# Patient Record
Sex: MALE | Race: WHITE | Employment: OTHER | URBAN - METROPOLITAN AREA
[De-identification: names, ages, dates, MRNs, and addresses within clinical notes are randomized per-mention and may not be internally consistent; named-entity substitution may affect disease eponyms.]

---

## 2019-12-02 ENCOUNTER — HOSPITAL ENCOUNTER (OUTPATIENT)
Age: 69
Discharge: HOME | End: 2019-12-04
Payer: MEDICARE

## 2019-12-02 LAB — PROSTATE SPECIFIC ANTIGEN: 3.44 NG/ML (ref 0–4)

## 2019-12-02 PROCEDURE — 84153 ASSAY OF PSA TOTAL: CPT

## 2020-02-06 ENCOUNTER — HOSPITAL ENCOUNTER (OUTPATIENT)
Age: 70
Discharge: HOME | End: 2020-02-08

## 2020-02-06 PROCEDURE — 88305 TISSUE EXAM BY PATHOLOGIST: CPT

## 2020-05-11 ENCOUNTER — HOSPITAL ENCOUNTER (OUTPATIENT)
Age: 70
Discharge: HOME | End: 2020-05-13
Payer: MEDICARE

## 2020-05-11 ENCOUNTER — VIRTUAL VISIT (OUTPATIENT)
Dept: SURGERY | Age: 70
End: 2020-05-11
Payer: MEDICARE

## 2020-05-11 PROCEDURE — G8427 DOCREV CUR MEDS BY ELIG CLIN: HCPCS | Performed by: SURGERY

## 2020-05-11 PROCEDURE — 4040F PNEUMOC VAC/ADMIN/RCVD: CPT | Performed by: SURGERY

## 2020-05-11 PROCEDURE — 84153 ASSAY OF PSA TOTAL: CPT

## 2020-05-11 PROCEDURE — 1123F ACP DISCUSS/DSCN MKR DOCD: CPT | Performed by: SURGERY

## 2020-05-11 PROCEDURE — 84154 ASSAY OF PSA FREE: CPT

## 2020-05-11 PROCEDURE — G8421 BMI NOT CALCULATED: HCPCS | Performed by: SURGERY

## 2020-05-11 PROCEDURE — 1036F TOBACCO NON-USER: CPT | Performed by: SURGERY

## 2020-05-11 PROCEDURE — 3017F COLORECTAL CA SCREEN DOC REV: CPT | Performed by: SURGERY

## 2020-05-11 PROCEDURE — 99203 OFFICE O/P NEW LOW 30 MIN: CPT | Performed by: SURGERY

## 2020-05-11 RX ORDER — ATORVASTATIN CALCIUM 20 MG/1
TABLET, FILM COATED ORAL
COMMUNITY
Start: 2020-03-15 | End: 2020-08-20 | Stop reason: SDUPTHER

## 2020-05-11 RX ORDER — PANTOPRAZOLE SODIUM 40 MG/1
TABLET, DELAYED RELEASE ORAL DAILY PRN
COMMUNITY
Start: 2020-02-07 | End: 2020-08-20 | Stop reason: SDUPTHER

## 2020-05-11 RX ORDER — OLMESARTAN MEDOXOMIL 20 MG/1
20 TABLET ORAL
COMMUNITY
End: 2020-08-20 | Stop reason: SDUPTHER

## 2020-05-11 RX ORDER — CLONAZEPAM 0.5 MG/1
TABLET ORAL NIGHTLY PRN
COMMUNITY
Start: 2020-04-06 | End: 2020-08-20 | Stop reason: SDUPTHER

## 2020-05-11 NOTE — PROGRESS NOTES
TELEHEALTH EVALUATION -- Audio/Visual (During  public health emergency)    History and Physical - General Surgery    Patient's Name/Date of Birth: Deloris Silva / 1950    Date: 2020    PCP: Karlene López MD    Referring Physician:   Bella Mccartney MD  [phone]      CHIEF COMPLAINT:    Chief Complaint   Patient presents with    Gastroesophageal Reflux     pt states he has been dealing with is a while. pt states he has gastritis, coughing, pt states he does have some burning when hes laying down. pt does protonix as needed x a few years     Other     pt has history of both hips being replaced will likely need abx        Deloris Silva (:  1950) has requested an audio/video evaluation for the following concern(s):    heartburn    Deloris Silva is an 71 y.o. male who presents with heartburn. He has been on Protonix off and on through the years. He has been taking Carafate for a while. He saw Dr. Opal Aragon many years ago and was told he had hemorrhagic gastritis at that time. He will take a course for a month or so and then feel better and stops it. He said he has been meaning to have this done for a while. He has regular colonoscopies. He has had these done by Dr. Juli Lorenzo in the past and he has a family history of colon cancer - his father  of it. He has no nausea or vomiting or abdominal pain. He said he has a constant tickle in his throat and is coughing a lot. He said this has been going on for years. Past Medical History:   Past Medical History:   Diagnosis Date    CAD (coronary artery disease)     Ventricular ectopic beats 2013        Past Surgical History:   Past Surgical History:   Procedure Laterality Date    COLONOSCOPY      CORONARY ANGIOPLASTY      DIAGNOSTIC CARDIAC CATH LAB PROCEDURE      JOINT REPLACEMENT Right , 2011    JOINT REPLACEMENT Left ,     TONSILLECTOMY          Allergies: Patient has no known allergies.

## 2020-05-14 LAB
PROSTATE SPECIFIC ANTIGEN FREE: 1.6 NG/ML
PROSTATE SPECIFIC ANTIGEN PERCENT FREE: 39 %
PROSTATE SPECIFIC ANTIGEN: 4.1 NG/ML (ref 0–4)

## 2020-05-18 ENCOUNTER — HOSPITAL ENCOUNTER (OUTPATIENT)
Age: 70
Setting detail: SPECIMEN
Discharge: HOME | End: 2020-05-18
Payer: MEDICARE

## 2020-05-18 PROCEDURE — U0003 INFECTIOUS AGENT DETECTION BY NUCLEIC ACID (DNA OR RNA); SEVERE ACUTE RESPIRATORY SYNDROME CORONAVIRUS 2 (SARS-COV-2) (CORONAVIRUS DISEASE [COVID-19]), AMPLIFIED PROBE TECHNIQUE, MAKING USE OF HIGH THROUGHPUT TECHNOLOGIES AS DESCRIBED BY CMS-2020-01-R: HCPCS

## 2020-05-19 RX ORDER — ALLOPURINOL 100 MG/1
100 TABLET ORAL EVERY OTHER DAY
COMMUNITY
End: 2020-08-20 | Stop reason: SDUPTHER

## 2020-05-19 RX ORDER — AMOXICILLIN 250 MG/1
250 CAPSULE ORAL DAILY PRN
COMMUNITY

## 2020-05-19 NOTE — PROGRESS NOTES
[facility] PRE-ADMISSION TESTING INSTRUCTIONS    The Preadmission Testing patient is instructed accordingly using the following criteria (check applicable):    ARRIVAL INSTRUCTIONS:  [x] Parking the day of Surgery is located in the Main Entrance lot. Upon entering the door, make an immediate right to the surgery reception desk    [] [phone] is available Monday through Friday 6 am to 6 pm    [x] Bring photo ID and insurance card    [] Bring in a copy of Living will or Durable Power of  papers. [x] Please be sure to arrange for responsible adult to provide transportation to and from the hospital    [x] Please arrange for responsible adult to be with you for the 24 hour period post procedure due to having anesthesia      GENERAL INSTRUCTIONS:    [x] Nothing by mouth after midnight, including gum, candy, mints or water    [x] You may brush your teeth, but do not swallow any water    [x] Take medications as instructed with 1-2 oz of water    [x] Stop herbal supplements and vitamins 5 days prior to procedure    [x] Follow preop dosing of blood thinners per physician instructions    [] Take 1/2 dose of evening insulin, but no insulin after midnight    [] No oral diabetic medications after midnight    [] If diabetic and have low blood sugar or feel symptomatic, take 1-2oz apple juice only    [] Bring inhalers day of surgery    [] Bring C-PAP/ Bi-Pap day of surgery    [] Bring urine specimen day of surgery    [] Shower or bath with soap, lather and rinse well, AM of Surgery, no lotion, powders or creams to surgical site    [] Follow bowel prep as instructed per surgeon    [x] No tobacco products within 24 hours of surgery     [x] No alcohol or illegal drug use within 24 hours of surgery.     [x] Jewelry, body piercing's, eyeglasses, contact lenses and dentures are not permitted into surgery (bring cases)      [] Please do not wear any nail polish, make up or hair products on the day of surgery    [x] If not already done, you can expect a call from registration    [x] You can expect a call the business day prior to procedure to notify you if your arrival time changes    [x] If you receive a survey after surgery we would greatly appreciate your comments    [] Parent/guardian of a minor must accompany their child and remain on the premises  the entire time they are under our care     [] Pediatric patients may bring favorite toy, blanket or comfort item with them    [] A caregiver or family member must remain with the patient during their stay if they are mentally handicapped, have dementia, disoriented or unable to use a call light or would be a safety concern if left unattended    [x] Please notify surgeon if you develop any illness between now and time of surgery (cold, cough, sore throat, fever, nausea, vomiting) or any signs of infections  including skin, wounds, and dental.    [x]  The Outpatient Pharmacy is available to fill your prescription here on your day of surgery, ask your preop nurse for details    [x] Other instructions  EDUCATIONAL MATERIALS PROVIDED:    [] PAT Preoperative Education Packet/Booklet     [] Medication List    [] Fluoroscopy Information Pamphlet    [] Transfusion bracelet applied with instructions    [] Joint replacement video reviewed    [] Shower with soap, lather and rinse well, and use CHG wipes provided the evening before surgery as instructed

## 2020-05-20 LAB
SARS-COV-2: NOT DETECTED
SOURCE: NORMAL

## 2020-05-22 ENCOUNTER — ANESTHESIA EVENT (OUTPATIENT)
Dept: ENDOSCOPY | Age: 70
End: 2020-05-22
Payer: MEDICARE

## 2020-05-22 ENCOUNTER — HOSPITAL ENCOUNTER (OUTPATIENT)
Age: 70
Setting detail: OUTPATIENT SURGERY
Discharge: HOME | End: 2020-05-22
Attending: SURGERY | Admitting: SURGERY
Payer: MEDICARE

## 2020-05-22 ENCOUNTER — ANESTHESIA (OUTPATIENT)
Dept: ENDOSCOPY | Age: 70
End: 2020-05-22
Payer: MEDICARE

## 2020-05-22 VITALS
BODY MASS INDEX: 28.49 KG/M2 | DIASTOLIC BLOOD PRESSURE: 86 MMHG | OXYGEN SATURATION: 96 % | WEIGHT: 215 LBS | TEMPERATURE: 97.8 F | RESPIRATION RATE: 16 BRPM | SYSTOLIC BLOOD PRESSURE: 150 MMHG | HEART RATE: 52 BPM | HEIGHT: 73 IN

## 2020-05-22 VITALS
RESPIRATION RATE: 11 BRPM | DIASTOLIC BLOOD PRESSURE: 84 MMHG | SYSTOLIC BLOOD PRESSURE: 158 MMHG | OXYGEN SATURATION: 93 %

## 2020-05-22 PROCEDURE — 7100000010 HC PHASE II RECOVERY - FIRST 15 MIN: Performed by: SURGERY

## 2020-05-22 PROCEDURE — 3609012400 HC EGD TRANSORAL BIOPSY SINGLE/MULTIPLE: Performed by: SURGERY

## 2020-05-22 PROCEDURE — 2709999900 HC NON-CHARGEABLE SUPPLY: Performed by: SURGERY

## 2020-05-22 PROCEDURE — 88305 TISSUE EXAM BY PATHOLOGIST: CPT

## 2020-05-22 PROCEDURE — 3700000000 HC ANESTHESIA ATTENDED CARE: Performed by: SURGERY

## 2020-05-22 PROCEDURE — 3700000001 HC ADD 15 MINUTES (ANESTHESIA): Performed by: SURGERY

## 2020-05-22 PROCEDURE — 7100000011 HC PHASE II RECOVERY - ADDTL 15 MIN: Performed by: SURGERY

## 2020-05-22 PROCEDURE — 43239 EGD BIOPSY SINGLE/MULTIPLE: CPT | Performed by: SURGERY

## 2020-05-22 PROCEDURE — 88342 IMHCHEM/IMCYTCHM 1ST ANTB: CPT

## 2020-05-22 PROCEDURE — 2580000003 HC RX 258: Performed by: NURSE ANESTHETIST, CERTIFIED REGISTERED

## 2020-05-22 PROCEDURE — 6360000002 HC RX W HCPCS: Performed by: NURSE ANESTHETIST, CERTIFIED REGISTERED

## 2020-05-22 RX ORDER — PROPOFOL 10 MG/ML
INJECTION, EMULSION INTRAVENOUS PRN
Status: DISCONTINUED | OUTPATIENT
Start: 2020-05-22 | End: 2020-05-22 | Stop reason: SDUPTHER

## 2020-05-22 RX ORDER — SODIUM CHLORIDE 9 MG/ML
INJECTION, SOLUTION INTRAVENOUS CONTINUOUS PRN
Status: DISCONTINUED | OUTPATIENT
Start: 2020-05-22 | End: 2020-05-22 | Stop reason: SDUPTHER

## 2020-05-22 RX ADMIN — SODIUM CHLORIDE: 9 INJECTION, SOLUTION INTRAVENOUS at 09:00

## 2020-05-22 RX ADMIN — PROPOFOL 200 MG: 10 INJECTION, EMULSION INTRAVENOUS at 09:09

## 2020-05-22 ASSESSMENT — PAIN SCALES - GENERAL
PAINLEVEL_OUTOF10: 0

## 2020-05-22 ASSESSMENT — PAIN - FUNCTIONAL ASSESSMENT: PAIN_FUNCTIONAL_ASSESSMENT: 0-10

## 2020-05-22 NOTE — H&P
Patient's office history and physical was reviewed. Patient examined. There has been no change in the patient's history and physical.      Physician Signature: Electronically signed by Dr. Reina Kehr (During [place] public health emergency)    History and Physical - General Surgery    Patient's Name/Date of Birth: Eric Ba / 1950    Date: 2020    PCP: Nevaeh Silva MD    Referring Physician:   Guera Mcnair MD  [phone]      CHIEF COMPLAINT:    No chief complaint on file. Eric Ba (:  1950) has requested an audio/video evaluation for the following concern(s):    heartburn    Eric Ba is an 71 y.o. male who presents with heartburn. He has been on Protonix off and on through the years. He has been taking Carafate for a while. He saw Dr. Dominick Colon many years ago and was told he had hemorrhagic gastritis at that time. He will take a course for a month or so and then feel better and stops it. He said he has been meaning to have this done for a while. He has regular colonoscopies. He has had these done by Dr. Bishnu Kauffman in the past and he has a family history of colon cancer - his father  of it. He has no nausea or vomiting or abdominal pain. He said he has a constant tickle in his throat and is coughing a lot. He said this has been going on for years.         Past Medical History:   Past Medical History:   Diagnosis Date    CAD (coronary artery disease)     with stents - Dr. Paco Galvin yearly     GERD (gastroesophageal reflux disease)     for EGD 20     Hypertension     Insomnia     Stress     mild- work related     Ventricular ectopic beats 2013        Past Surgical History:   Past Surgical History:   Procedure Laterality Date    COLONOSCOPY      CORONARY ANGIOPLASTY      with stent      DIAGNOSTIC CARDIAC CATH LAB PROCEDURE      ENDOSCOPY, COLON, DIAGNOSTIC      JOINT REPLACEMENT Right Pulmonary/Chest: [x] Respiratory effort normal.         [x] No visualized signs of difficulty breathing or respiratory distress        [] Abnormal-      Musculoskeletal:   [x] Normal gait with no signs of ataxia         [x] Normal range of motion of neck        [] Abnormal-       Neurological:        [x] No Facial Asymmetry (Cranial nerve 7 motor function) (limited exam to video visit)          [x] No gaze palsy        [] Abnormal-         Skin:        [x] No significant exanthematous lesions or discoloration noted on facial skin         [] Abnormal-            Psychiatric:       [x] Normal Affect [x] No Hallucinations        [] Abnormal-     Other pertinent observable physical exam findings-     Due to this being a TeleHealth encounter, evaluation of the following organ systems is limited: Vitals/Constitutional/EENT/Resp/CV/GI//MS/Neuro/Skin/Heme-Lymph-Imm. DATA:      ASSESSMENT AND PLAN:       Assessment: Malorie Jean is an 71 y.o. male who presents with heartburn    Plan: EGD possible biopsy possible polypectomy  I explained the risks, benefits, alternatives, and potential complications associated with the above procedure to be performed and transfusions when applicable with the patient/responsible person prior to the procedure. All of the patient's questions were answered. The patient understands and agrees to surgery. Physician Signature: Electronically signed by Dionisio Hernandez MD, General Surgery    Send copy of H&P to PCP, Sandra Georges MD and referring physician, Omar Cruz MD      5/11/2020        Pursuant to the emergency declaration under the [facility], [id number] waiver authority and the [employer] Act, this Virtual  Visit was conducted, with patient's consent, to reduce the patient's risk of exposure to COVID-19 and provide continuity of care for an established patient.     Services

## 2020-05-27 ENCOUNTER — TELEPHONE (OUTPATIENT)
Dept: SURGERY | Age: 70
End: 2020-05-27

## 2020-05-27 NOTE — TELEPHONE ENCOUNTER
----- Message from Yuly Pabon MD sent at 5/27/2020  1:51 PM EDT -----  Call patient with results. Schedule an appointment if still having problems.

## 2020-05-27 NOTE — TELEPHONE ENCOUNTER
Left message on voice mail. Pt to be given results of EGD. Per Dr Lexa Barnes.  No follow up needed unless pt wants to talk to

## 2020-08-20 ENCOUNTER — HOSPITAL ENCOUNTER (OUTPATIENT)
Age: 70
Discharge: HOME | End: 2020-08-22
Payer: MEDICARE

## 2020-08-20 PROBLEM — E55.9 VITAMIN D DEFICIENCY: Status: ACTIVE | Noted: 2020-08-20

## 2020-08-20 PROBLEM — E79.0 HYPERURICEMIA: Status: ACTIVE | Noted: 2020-08-20

## 2020-08-20 PROBLEM — K21.9 GASTROESOPHAGEAL REFLUX DISEASE: Status: ACTIVE | Noted: 2020-08-20

## 2020-08-20 PROBLEM — I10 ESSENTIAL HYPERTENSION: Status: ACTIVE | Noted: 2020-08-20

## 2020-08-20 PROBLEM — F41.9 ANXIETY AND DEPRESSION: Status: ACTIVE | Noted: 2020-08-20

## 2020-08-20 PROBLEM — E78.5 DYSLIPIDEMIA: Status: ACTIVE | Noted: 2020-08-20

## 2020-08-20 PROBLEM — F32.A ANXIETY AND DEPRESSION: Status: ACTIVE | Noted: 2020-08-20

## 2020-08-20 LAB
ALBUMIN SERPL-MCNC: 4.4 G/DL (ref 3.5–5.2)
ALP BLD-CCNC: 74 U/L (ref 40–129)
ALT SERPL-CCNC: 15 U/L (ref 0–40)
ANION GAP SERPL CALCULATED.3IONS-SCNC: 21 MMOL/L (ref 7–16)
AST SERPL-CCNC: 27 U/L (ref 0–39)
BASOPHILS ABSOLUTE: 0.05 E9/L (ref 0–0.2)
BASOPHILS RELATIVE PERCENT: 0.6 % (ref 0–2)
BILIRUB SERPL-MCNC: 0.8 MG/DL (ref 0–1.2)
BUN BLDV-MCNC: 21 MG/DL (ref 8–23)
CALCIUM SERPL-MCNC: 10 MG/DL (ref 8.6–10.2)
CHLORIDE BLD-SCNC: 104 MMOL/L (ref 98–107)
CHOLESTEROL, TOTAL: 150 MG/DL (ref 0–199)
CO2: 21 MMOL/L (ref 22–29)
CREAT SERPL-MCNC: 1.1 MG/DL (ref 0.7–1.2)
EOSINOPHILS ABSOLUTE: 0.11 E9/L (ref 0.05–0.5)
EOSINOPHILS RELATIVE PERCENT: 1.3 % (ref 0–6)
GFR AFRICAN AMERICAN: >60
GFR NON-AFRICAN AMERICAN: >60 ML/MIN/1.73
GLUCOSE BLD-MCNC: 90 MG/DL (ref 74–99)
HCT VFR BLD CALC: 48.5 % (ref 37–54)
HDLC SERPL-MCNC: 48 MG/DL
HEMOGLOBIN: 15.4 G/DL (ref 12.5–16.5)
IMMATURE GRANULOCYTES #: 0.03 E9/L
IMMATURE GRANULOCYTES %: 0.4 % (ref 0–5)
LDL CHOLESTEROL CALCULATED: 83 MG/DL (ref 0–99)
LYMPHOCYTES ABSOLUTE: 1.83 E9/L (ref 1.5–4)
LYMPHOCYTES RELATIVE PERCENT: 22.2 % (ref 20–42)
MCH RBC QN AUTO: 29.3 PG (ref 26–35)
MCHC RBC AUTO-ENTMCNC: 31.8 % (ref 32–34.5)
MCV RBC AUTO: 92.2 FL (ref 80–99.9)
MONOCYTES ABSOLUTE: 0.59 E9/L (ref 0.1–0.95)
MONOCYTES RELATIVE PERCENT: 7.2 % (ref 2–12)
NEUTROPHILS ABSOLUTE: 5.62 E9/L (ref 1.8–7.3)
NEUTROPHILS RELATIVE PERCENT: 68.3 % (ref 43–80)
PDW BLD-RTO: 13.2 FL (ref 11.5–15)
PLATELET # BLD: 176 E9/L (ref 130–450)
PMV BLD AUTO: 11.6 FL (ref 7–12)
POTASSIUM SERPL-SCNC: 4.6 MMOL/L (ref 3.5–5)
RBC # BLD: 5.26 E12/L (ref 3.8–5.8)
SODIUM BLD-SCNC: 146 MMOL/L (ref 132–146)
TOTAL PROTEIN: 7.4 G/DL (ref 6.4–8.3)
TRIGL SERPL-MCNC: 95 MG/DL (ref 0–149)
URIC ACID, SERUM: 6.4 MG/DL (ref 3.4–7)
VITAMIN D 25-HYDROXY: 85 NG/ML (ref 30–100)
VLDLC SERPL CALC-MCNC: 19 MG/DL
WBC # BLD: 8.2 E9/L (ref 4.5–11.5)

## 2020-08-20 PROCEDURE — 80061 LIPID PANEL: CPT

## 2020-08-20 PROCEDURE — 86703 HIV-1/HIV-2 1 RESULT ANTBDY: CPT

## 2020-08-20 PROCEDURE — 84550 ASSAY OF BLOOD/URIC ACID: CPT

## 2020-08-20 PROCEDURE — 80053 COMPREHEN METABOLIC PANEL: CPT

## 2020-08-20 PROCEDURE — 82306 VITAMIN D 25 HYDROXY: CPT

## 2020-08-20 PROCEDURE — 86803 HEPATITIS C AB TEST: CPT

## 2020-08-20 PROCEDURE — 85025 COMPLETE CBC W/AUTO DIFF WBC: CPT

## 2020-08-21 LAB
HEPATITIS C ANTIBODY INTERPRETATION: NORMAL
HIV-1 AND HIV-2 ANTIBODIES: NORMAL

## 2020-11-05 PROBLEM — I10 ESSENTIAL HYPERTENSION: Chronic | Status: ACTIVE | Noted: 2020-08-20

## 2020-11-05 PROBLEM — E78.5 DYSLIPIDEMIA: Chronic | Status: ACTIVE | Noted: 2020-08-20

## 2020-11-05 PROBLEM — K21.9 GASTROESOPHAGEAL REFLUX DISEASE: Chronic | Status: ACTIVE | Noted: 2020-08-20

## 2020-11-05 PROBLEM — F32.A ANXIETY AND DEPRESSION: Chronic | Status: ACTIVE | Noted: 2020-08-20

## 2020-11-05 PROBLEM — E79.0 HYPERURICEMIA: Chronic | Status: ACTIVE | Noted: 2020-08-20

## 2020-11-05 PROBLEM — F41.9 ANXIETY AND DEPRESSION: Chronic | Status: ACTIVE | Noted: 2020-08-20

## 2020-11-05 PROBLEM — E55.9 VITAMIN D DEFICIENCY: Chronic | Status: ACTIVE | Noted: 2020-08-20

## 2021-02-23 DIAGNOSIS — K21.9 GASTROESOPHAGEAL REFLUX DISEASE: ICD-10-CM

## 2021-02-23 DIAGNOSIS — E79.0 HYPERURICEMIA: ICD-10-CM

## 2021-02-23 DIAGNOSIS — I10 ESSENTIAL HYPERTENSION: ICD-10-CM

## 2021-02-23 DIAGNOSIS — E78.5 DYSLIPIDEMIA: ICD-10-CM

## 2021-02-23 LAB
ALBUMIN SERPL-MCNC: 4.3 G/DL (ref 3.5–5.2)
ALP BLD-CCNC: 77 U/L (ref 40–129)
ALT SERPL-CCNC: 13 U/L (ref 0–40)
ANION GAP SERPL CALCULATED.3IONS-SCNC: 6 MMOL/L (ref 7–16)
AST SERPL-CCNC: 24 U/L (ref 0–39)
BASOPHILS ABSOLUTE: 0.04 E9/L (ref 0–0.2)
BASOPHILS RELATIVE PERCENT: 0.6 % (ref 0–2)
BILIRUB SERPL-MCNC: 0.9 MG/DL (ref 0–1.2)
BUN BLDV-MCNC: 19 MG/DL (ref 8–23)
CALCIUM SERPL-MCNC: 9.2 MG/DL (ref 8.6–10.2)
CHLORIDE BLD-SCNC: 104 MMOL/L (ref 98–107)
CHOLESTEROL, TOTAL: 135 MG/DL (ref 0–199)
CO2: 31 MMOL/L (ref 22–29)
CREAT SERPL-MCNC: 1.3 MG/DL (ref 0.7–1.2)
EOSINOPHILS ABSOLUTE: 0.13 E9/L (ref 0.05–0.5)
EOSINOPHILS RELATIVE PERCENT: 2 % (ref 0–6)
GFR AFRICAN AMERICAN: >60
GFR NON-AFRICAN AMERICAN: 55 ML/MIN/1.73
GLUCOSE BLD-MCNC: 92 MG/DL (ref 74–99)
HCT VFR BLD CALC: 48.2 % (ref 37–54)
HDLC SERPL-MCNC: 39 MG/DL
HEMOGLOBIN: 15.4 G/DL (ref 12.5–16.5)
IMMATURE GRANULOCYTES #: 0.01 E9/L
IMMATURE GRANULOCYTES %: 0.2 % (ref 0–5)
LDL CHOLESTEROL CALCULATED: 79 MG/DL (ref 0–99)
LYMPHOCYTES ABSOLUTE: 1.64 E9/L (ref 1.5–4)
LYMPHOCYTES RELATIVE PERCENT: 24.8 % (ref 20–42)
MCH RBC QN AUTO: 28.7 PG (ref 26–35)
MCHC RBC AUTO-ENTMCNC: 32 % (ref 32–34.5)
MCV RBC AUTO: 89.9 FL (ref 80–99.9)
MONOCYTES ABSOLUTE: 0.57 E9/L (ref 0.1–0.95)
MONOCYTES RELATIVE PERCENT: 8.6 % (ref 2–12)
NEUTROPHILS ABSOLUTE: 4.23 E9/L (ref 1.8–7.3)
NEUTROPHILS RELATIVE PERCENT: 63.8 % (ref 43–80)
PDW BLD-RTO: 12.7 FL (ref 11.5–15)
PLATELET # BLD: 160 E9/L (ref 130–450)
PMV BLD AUTO: 11.7 FL (ref 7–12)
POTASSIUM SERPL-SCNC: 4.3 MMOL/L (ref 3.5–5)
RBC # BLD: 5.36 E12/L (ref 3.8–5.8)
SODIUM BLD-SCNC: 141 MMOL/L (ref 132–146)
TOTAL PROTEIN: 6.9 G/DL (ref 6.4–8.3)
TRIGL SERPL-MCNC: 87 MG/DL (ref 0–149)
URIC ACID, SERUM: 7.1 MG/DL (ref 3.4–7)
VLDLC SERPL CALC-MCNC: 17 MG/DL
WBC # BLD: 6.6 E9/L (ref 4.5–11.5)

## 2021-06-08 DIAGNOSIS — E78.5 DYSLIPIDEMIA: ICD-10-CM

## 2021-06-08 DIAGNOSIS — I10 ESSENTIAL HYPERTENSION: ICD-10-CM

## 2021-06-08 DIAGNOSIS — E79.0 HYPERURICEMIA: ICD-10-CM

## 2021-06-08 DIAGNOSIS — K21.9 GASTROESOPHAGEAL REFLUX DISEASE WITHOUT ESOPHAGITIS: ICD-10-CM

## 2021-06-08 DIAGNOSIS — E55.9 VITAMIN D DEFICIENCY: ICD-10-CM

## 2021-06-08 LAB
ALBUMIN SERPL-MCNC: 4 G/DL (ref 3.5–5.2)
ALP BLD-CCNC: 61 U/L (ref 40–129)
ALT SERPL-CCNC: 11 U/L (ref 0–40)
ANION GAP SERPL CALCULATED.3IONS-SCNC: 12 MMOL/L (ref 7–16)
AST SERPL-CCNC: 18 U/L (ref 0–39)
BASOPHILS ABSOLUTE: 0.02 E9/L (ref 0–0.2)
BASOPHILS RELATIVE PERCENT: 0.3 % (ref 0–2)
BILIRUB SERPL-MCNC: 0.6 MG/DL (ref 0–1.2)
BUN BLDV-MCNC: 23 MG/DL (ref 6–23)
CALCIUM SERPL-MCNC: 9.5 MG/DL (ref 8.6–10.2)
CHLORIDE BLD-SCNC: 106 MMOL/L (ref 98–107)
CHOLESTEROL, TOTAL: 165 MG/DL (ref 0–199)
CO2: 27 MMOL/L (ref 22–29)
CREAT SERPL-MCNC: 1.2 MG/DL (ref 0.7–1.2)
EOSINOPHILS ABSOLUTE: 0.14 E9/L (ref 0.05–0.5)
EOSINOPHILS RELATIVE PERCENT: 2.2 % (ref 0–6)
GFR AFRICAN AMERICAN: >60
GFR NON-AFRICAN AMERICAN: 60 ML/MIN/1.73
GLUCOSE BLD-MCNC: 93 MG/DL (ref 74–99)
HCT VFR BLD CALC: 47.1 % (ref 37–54)
HDLC SERPL-MCNC: 42 MG/DL
HEMOGLOBIN: 14.9 G/DL (ref 12.5–16.5)
IMMATURE GRANULOCYTES #: 0.03 E9/L
IMMATURE GRANULOCYTES %: 0.5 % (ref 0–5)
LDL CHOLESTEROL CALCULATED: 103 MG/DL (ref 0–99)
LYMPHOCYTES ABSOLUTE: 1.82 E9/L (ref 1.5–4)
LYMPHOCYTES RELATIVE PERCENT: 28.5 % (ref 20–42)
MCH RBC QN AUTO: 29.3 PG (ref 26–35)
MCHC RBC AUTO-ENTMCNC: 31.6 % (ref 32–34.5)
MCV RBC AUTO: 92.5 FL (ref 80–99.9)
MONOCYTES ABSOLUTE: 0.6 E9/L (ref 0.1–0.95)
MONOCYTES RELATIVE PERCENT: 9.4 % (ref 2–12)
NEUTROPHILS ABSOLUTE: 3.77 E9/L (ref 1.8–7.3)
NEUTROPHILS RELATIVE PERCENT: 59.1 % (ref 43–80)
PDW BLD-RTO: 13.2 FL (ref 11.5–15)
PLATELET # BLD: 136 E9/L (ref 130–450)
PMV BLD AUTO: 12 FL (ref 7–12)
POTASSIUM SERPL-SCNC: 5.1 MMOL/L (ref 3.5–5)
RBC # BLD: 5.09 E12/L (ref 3.8–5.8)
SODIUM BLD-SCNC: 145 MMOL/L (ref 132–146)
TOTAL PROTEIN: 6.6 G/DL (ref 6.4–8.3)
TRIGL SERPL-MCNC: 99 MG/DL (ref 0–149)
URIC ACID, SERUM: 7.2 MG/DL (ref 3.4–7)
VITAMIN D 25-HYDROXY: 76 NG/ML (ref 30–100)
VLDLC SERPL CALC-MCNC: 20 MG/DL
WBC # BLD: 6.4 E9/L (ref 4.5–11.5)

## 2021-09-16 PROBLEM — D69.6 THROMBOCYTOPENIA, UNSPECIFIED (HCC): Status: RESOLVED | Noted: 2021-09-16 | Resolved: 2021-09-16

## 2021-09-16 PROBLEM — D69.6 THROMBOCYTOPENIA, UNSPECIFIED (HCC): Status: ACTIVE | Noted: 2021-09-16

## 2021-12-16 DIAGNOSIS — I10 ESSENTIAL HYPERTENSION: ICD-10-CM

## 2021-12-16 DIAGNOSIS — E55.9 VITAMIN D DEFICIENCY: ICD-10-CM

## 2021-12-16 DIAGNOSIS — K21.9 GASTROESOPHAGEAL REFLUX DISEASE WITHOUT ESOPHAGITIS: ICD-10-CM

## 2021-12-16 DIAGNOSIS — E78.5 DYSLIPIDEMIA: ICD-10-CM

## 2021-12-16 LAB
ALBUMIN SERPL-MCNC: 4 G/DL (ref 3.5–5.2)
ALP BLD-CCNC: 71 U/L (ref 40–129)
ALT SERPL-CCNC: 25 U/L (ref 0–40)
ANION GAP SERPL CALCULATED.3IONS-SCNC: 11 MMOL/L (ref 7–16)
AST SERPL-CCNC: 19 U/L (ref 0–39)
BASOPHILS ABSOLUTE: 0.04 E9/L (ref 0–0.2)
BASOPHILS RELATIVE PERCENT: 0.5 % (ref 0–2)
BILIRUB SERPL-MCNC: 0.8 MG/DL (ref 0–1.2)
BUN BLDV-MCNC: 24 MG/DL (ref 6–23)
CALCIUM SERPL-MCNC: 9.8 MG/DL (ref 8.6–10.2)
CHLORIDE BLD-SCNC: 102 MMOL/L (ref 98–107)
CHOLESTEROL, TOTAL: 153 MG/DL (ref 0–199)
CO2: 30 MMOL/L (ref 22–29)
CREAT SERPL-MCNC: 1.2 MG/DL (ref 0.7–1.2)
EOSINOPHILS ABSOLUTE: 0.29 E9/L (ref 0.05–0.5)
EOSINOPHILS RELATIVE PERCENT: 3.4 % (ref 0–6)
GFR AFRICAN AMERICAN: >60
GFR NON-AFRICAN AMERICAN: 60 ML/MIN/1.73
GLUCOSE BLD-MCNC: 94 MG/DL (ref 74–99)
HCT VFR BLD CALC: 46.8 % (ref 37–54)
HDLC SERPL-MCNC: 44 MG/DL
HEMOGLOBIN: 15.1 G/DL (ref 12.5–16.5)
IMMATURE GRANULOCYTES #: 0.04 E9/L
IMMATURE GRANULOCYTES %: 0.5 % (ref 0–5)
LDL CHOLESTEROL CALCULATED: 79 MG/DL (ref 0–99)
LYMPHOCYTES ABSOLUTE: 2.24 E9/L (ref 1.5–4)
LYMPHOCYTES RELATIVE PERCENT: 26 % (ref 20–42)
MCH RBC QN AUTO: 29.3 PG (ref 26–35)
MCHC RBC AUTO-ENTMCNC: 32.3 % (ref 32–34.5)
MCV RBC AUTO: 90.9 FL (ref 80–99.9)
MONOCYTES ABSOLUTE: 0.71 E9/L (ref 0.1–0.95)
MONOCYTES RELATIVE PERCENT: 8.3 % (ref 2–12)
NEUTROPHILS ABSOLUTE: 5.28 E9/L (ref 1.8–7.3)
NEUTROPHILS RELATIVE PERCENT: 61.3 % (ref 43–80)
PDW BLD-RTO: 12.9 FL (ref 11.5–15)
PLATELET # BLD: 151 E9/L (ref 130–450)
PMV BLD AUTO: 11.9 FL (ref 7–12)
POTASSIUM SERPL-SCNC: 4.5 MMOL/L (ref 3.5–5)
RBC # BLD: 5.15 E12/L (ref 3.8–5.8)
SODIUM BLD-SCNC: 143 MMOL/L (ref 132–146)
TOTAL PROTEIN: 6.5 G/DL (ref 6.4–8.3)
TRIGL SERPL-MCNC: 152 MG/DL (ref 0–149)
VITAMIN D 25-HYDROXY: 77 NG/ML (ref 30–100)
VLDLC SERPL CALC-MCNC: 30 MG/DL
WBC # BLD: 8.6 E9/L (ref 4.5–11.5)

## 2022-02-03 ENCOUNTER — HOSPITAL ENCOUNTER (OUTPATIENT)
Age: 72
Discharge: HOME | End: 2022-02-05

## 2022-02-03 PROCEDURE — 88305 TISSUE EXAM BY PATHOLOGIST: CPT

## 2022-05-29 ENCOUNTER — HOSPITAL ENCOUNTER (EMERGENCY)
Age: 72
Discharge: HOME | End: 2022-05-29
Payer: MEDICARE

## 2022-05-29 VITALS
WEIGHT: 210 LBS | SYSTOLIC BLOOD PRESSURE: 152 MMHG | HEIGHT: 73 IN | TEMPERATURE: 98 F | HEART RATE: 50 BPM | DIASTOLIC BLOOD PRESSURE: 88 MMHG | OXYGEN SATURATION: 98 % | BODY MASS INDEX: 27.83 KG/M2 | RESPIRATION RATE: 18 BRPM

## 2022-05-29 DIAGNOSIS — J06.9 ACUTE UPPER RESPIRATORY INFECTION: Primary | ICD-10-CM

## 2022-05-29 PROCEDURE — 99211 OFF/OP EST MAY X REQ PHY/QHP: CPT

## 2022-05-29 RX ORDER — CEPHALEXIN 500 MG/1
500 CAPSULE ORAL 3 TIMES DAILY
Qty: 21 CAPSULE | Refills: 0 | Status: SHIPPED | OUTPATIENT
Start: 2022-05-29 | End: 2022-06-05

## 2022-05-29 ASSESSMENT — PAIN - FUNCTIONAL ASSESSMENT: PAIN_FUNCTIONAL_ASSESSMENT: 0-10

## 2022-05-29 NOTE — ED PROVIDER NOTES
Department of Emergency Medicine   ED  Provider Note  Admit Date/RoomTime: 5/29/2022  8:07 AM  ED Room: 02/02 5/29/22  8:24 AM EDT      HPI: Nate Orr is a 70 y.o. male with a past medical history of  has a past medical history of CAD (coronary artery disease), GERD (gastroesophageal reflux disease), Hypertension, Insomnia, Spondylosis, Stress, and Ventricular ectopic beats. presenting with complaints of a cough with nasal congestion. The patient states that these symptoms began gradually over the past 3 days. The history is obtained from the patient. Patient does complain of a mild cough associated with it that is nonproductive. Patient denies excessive fatigue or sleeping greater than 18 hours a day. Patient denies exposure to mononucleosis. The patient denies any abdominal pain, left upper quadrant fullness, or early satiety. The patient also denies difficulty breathing, hemoptysis, neck pain/stiffness, or blurry vision, or chest pain. Sx have persisted and are mildly worse which is what prompted the visit today. Denies exposure to sick contacts. 2 negative home covid tests. Review of Systems:   A complete review of systems was performed and pertinent positives and negatives are stated within HPI, all other systems reviewed and are negative.        --------------------------------------------- PAST HISTORY ---------------------------------------------  Past Medical History:  has a past medical history of CAD (coronary artery disease), GERD (gastroesophageal reflux disease), Hypertension, Insomnia, Spondylosis, Stress, and Ventricular ectopic beats. Past Surgical History:  has a past surgical history that includes Colonoscopy; Tonsillectomy; Diagnostic Cardiac Cath Lab Procedure; Endoscopy, colon, diagnostic; joint replacement (Right, 1990, 11/2011); joint replacement (Left, 1990, 2007); Coronary angioplasty; and Upper gastrointestinal endoscopy (N/A, 5/22/2020).     Social History: reports that he has never smoked. He has never used smokeless tobacco. He reports that he does not drink alcohol and does not use drugs. Family History: family history includes Cancer in his father; Diabetes in his mother. The patients home medications have been reviewed. Allergies: Patient has no known allergies. Physical exam:  Constitutional: Vital signs are reviewed the patient is comfortable. The patient is alert and oriented and conversant. Head: The head is atraumatic and normocephalic. Eyes: No discharge is present from the eyes. The sclera are normal.  Ears: TMs bilaterally demonstrate no erythema, no bulging and no evidence of infection. Mouth: The oropharynx demonstrates a small amount of erythema bilaterally. There is no tonsillar enlargement nor is there any exudate present. No uvular deviation or edema. No tonsillary asymmetry. Floor of the mouth soft, no trismus, handling secretions. Neck: Normal range of motion is achieved in the neck. There is no JVD present. No meningeal signs are present   Anterior cervical adenopathy is absent. [place] Respiratory: The chest is nontender. Breath sounds are clear to auscultation bilaterally. No wheezes, rales, or rhonchi. There is no respiratory distress. Cardiovascular: Heart shows a regular rate and rhythm without murmurs, rubs, clicks or gallops. Abdominal exam: The abdomen is non tender without evidence of peritoneal signs. Specific attention to the left upper quadrant with palpation of the spleen demonstrates no organomegaly or tenderness  Skin: warm and dry, without rash  Neurologic: GCS 15   Psych: Normal Affect  -------------------------------------------------- RESULTS -------------------------------------------------    LABS:  No results found for this visit on 05/29/22.     RADIOLOGY:  Interpreted by Radiologist.  No orders to display             ------------------------- NURSING NOTES AND VITALS REVIEWED ---------------------------   The nursing notes within the ED encounter and vital signs as below have been reviewed. BP (!) 152/88   Pulse 50   Temp 98 °F (36.7 °C)   Resp 18   Ht 6' 1\" (1.854 m)   Wt 210 lb (95.3 kg)   SpO2 98%   BMI 27.71 kg/m²   Oxygen Saturation Interpretation: Normal    The patients available past medical records and past encounters were reviewed. ------------------------------ ED COURSE/MEDICAL DECISION MAKING----------------------  Medications - No data to display          Medical Decision Making:         Upper respiratory infection. Not hypoxic, nothing to suggest pneumonia. Well appearing, non toxic, appropriate for outpatient management. Plan is for symptom management and PCP follow up. This patient's ED course included: a personal history and physicial eaxmination and re-evaluation prior to disposition    This patient has remained hemodynamically stable during their ED course. Counseling: The emergency provider has spoken with the patient and discussed todays results, in addition to providing specific details for the plan of care and counseling regarding the diagnosis and prognosis. Questions are answered at this time and they are agreeable with the plan.       --------------------------------- IMPRESSION AND DISPOSITION ---------------------------------    IMPRESSION  1.  Acute upper respiratory infection        DISPOSITION  Disposition: Discharge to home  Patient condition is good            Elizabeth De La Fuente, [address]  05/29/22 [id number]

## 2022-06-22 PROBLEM — D69.6 THROMBOCYTOPENIA, UNSPECIFIED (HCC): Status: RESOLVED | Noted: 2022-06-22 | Resolved: 2022-06-22

## 2022-06-22 PROBLEM — D69.6 THROMBOCYTOPENIA, UNSPECIFIED (HCC): Status: ACTIVE | Noted: 2022-06-22

## 2022-06-27 ENCOUNTER — APPOINTMENT (OUTPATIENT)
Dept: CT IMAGING | Age: 72
End: 2022-06-27
Payer: COMMERCIAL

## 2022-06-27 ENCOUNTER — HOSPITAL ENCOUNTER (EMERGENCY)
Age: 72
Discharge: HOME | End: 2022-06-27
Payer: COMMERCIAL

## 2022-06-27 ENCOUNTER — APPOINTMENT (OUTPATIENT)
Dept: GENERAL RADIOLOGY | Age: 72
End: 2022-06-27
Payer: COMMERCIAL

## 2022-06-27 VITALS
DIASTOLIC BLOOD PRESSURE: 96 MMHG | RESPIRATION RATE: 16 BRPM | TEMPERATURE: 97.7 F | HEIGHT: 73 IN | BODY MASS INDEX: 27.83 KG/M2 | HEART RATE: 60 BPM | SYSTOLIC BLOOD PRESSURE: 189 MMHG | OXYGEN SATURATION: 99 % | WEIGHT: 210 LBS
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
## 2022-06-27 DIAGNOSIS — S29.012A STRAIN OF THORACIC BACK REGION: ICD-10-CM

## 2022-06-27 DIAGNOSIS — S16.1XXA CERVICAL STRAIN, INITIAL ENCOUNTER: ICD-10-CM

## 2022-06-27 PROCEDURE — 70450 CT HEAD/BRAIN W/O DYE: CPT

## 2022-06-27 PROCEDURE — 72128 CT CHEST SPINE W/O DYE: CPT

## 2022-06-27 PROCEDURE — 99284 EMERGENCY DEPT VISIT MOD MDM: CPT

## 2022-06-27 PROCEDURE — 71045 X-RAY EXAM CHEST 1 VIEW: CPT

## 2022-06-27 PROCEDURE — 72125 CT NECK SPINE W/O DYE: CPT

## 2022-06-27 RX ORDER — NAPROXEN 500 MG/1
500 TABLET ORAL 2 TIMES DAILY PRN
Qty: 20 TABLET | Refills: 0 | Status: SHIPPED | OUTPATIENT
Start: 2022-06-27 | End: 2022-10-18

## 2022-06-27 RX ORDER — METHOCARBAMOL 750 MG/1
750 TABLET, FILM COATED ORAL EVERY 8 HOURS PRN
Qty: 24 TABLET | Refills: 0 | Status: SHIPPED | OUTPATIENT
Start: 2022-06-27 | End: 2022-07-04

## 2022-06-27 ASSESSMENT — PAIN DESCRIPTION - LOCATION: LOCATION: BACK

## 2022-06-27 ASSESSMENT — PAIN DESCRIPTION - FREQUENCY: FREQUENCY: CONTINUOUS

## 2022-06-27 ASSESSMENT — PAIN - FUNCTIONAL ASSESSMENT: PAIN_FUNCTIONAL_ASSESSMENT: 0-10

## 2022-06-27 ASSESSMENT — PAIN DESCRIPTION - DESCRIPTORS: DESCRIPTORS: ACHING

## 2022-06-27 ASSESSMENT — PAIN DESCRIPTION - ORIENTATION: ORIENTATION: MID

## 2022-06-27 ASSESSMENT — PAIN DESCRIPTION - PAIN TYPE: TYPE: ACUTE PAIN
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
## 2023-02-08 ENCOUNTER — HOSPITAL ENCOUNTER (INPATIENT)
Age: 73
LOS: 1 days | Discharge: HOME | End: 2023-02-10
Attending: EMERGENCY MEDICINE | Admitting: SURGERY
Payer: MEDICARE

## 2023-02-08 ENCOUNTER — APPOINTMENT (OUTPATIENT)
Dept: CT IMAGING | Age: 73
End: 2023-02-08
Payer: MEDICARE

## 2023-02-08 DIAGNOSIS — K35.80 ACUTE APPENDICITIS, UNSPECIFIED ACUTE APPENDICITIS TYPE: ICD-10-CM

## 2023-02-08 DIAGNOSIS — K35.20 ACUTE APPENDICITIS WITH GENERALIZED PERITONITIS, WITHOUT GANGRENE OR ABSCESS, UNSPECIFIED WHETHER PERFORATION PRESENT: Primary | ICD-10-CM

## 2023-02-08 LAB
ALBUMIN SERPL-MCNC: 3.8 G/DL (ref 3.5–5.2)
ALP BLD-CCNC: 64 U/L (ref 40–129)
ALT SERPL-CCNC: 11 U/L (ref 0–40)
ANION GAP SERPL CALCULATED.3IONS-SCNC: 10 MMOL/L (ref 7–16)
AST SERPL-CCNC: 16 U/L (ref 0–39)
BACTERIA: ABNORMAL /HPF
BASOPHILS ABSOLUTE: 0.02 E9/L (ref 0–0.2)
BASOPHILS RELATIVE PERCENT: 0.2 % (ref 0–2)
BILIRUB SERPL-MCNC: 0.9 MG/DL (ref 0–1.2)
BILIRUBIN URINE: NEGATIVE
BLOOD, URINE: ABNORMAL
BUN BLDV-MCNC: 21 MG/DL (ref 6–23)
CALCIUM SERPL-MCNC: 9 MG/DL (ref 8.6–10.2)
CHLORIDE BLD-SCNC: 103 MMOL/L (ref 98–107)
CLARITY: CLEAR
CO2: 27 MMOL/L (ref 22–29)
COLOR: YELLOW
CREAT SERPL-MCNC: 1.1 MG/DL (ref 0.7–1.2)
EOSINOPHILS ABSOLUTE: 0.12 E9/L (ref 0.05–0.5)
EOSINOPHILS RELATIVE PERCENT: 1.1 % (ref 0–6)
GFR SERPL CREATININE-BSD FRML MDRD: >60 ML/MIN/1.73
GLUCOSE BLD-MCNC: 98 MG/DL (ref 74–99)
GLUCOSE URINE: NEGATIVE MG/DL
HCT VFR BLD CALC: 42.4 % (ref 37–54)
HEMOGLOBIN: 13.7 G/DL (ref 12.5–16.5)
IMMATURE GRANULOCYTES #: 0.04 E9/L
IMMATURE GRANULOCYTES %: 0.4 % (ref 0–5)
KETONES, URINE: ABNORMAL MG/DL
LACTIC ACID: 1.1 MMOL/L (ref 0.5–2.2)
LEUKOCYTE ESTERASE, URINE: NEGATIVE
LIPASE: 38 U/L (ref 13–60)
LYMPHOCYTES ABSOLUTE: 1.89 E9/L (ref 1.5–4)
LYMPHOCYTES RELATIVE PERCENT: 17 % (ref 20–42)
MCH RBC QN AUTO: 28.4 PG (ref 26–35)
MCHC RBC AUTO-ENTMCNC: 32.3 % (ref 32–34.5)
MCV RBC AUTO: 87.8 FL (ref 80–99.9)
MONOCYTES ABSOLUTE: 0.99 E9/L (ref 0.1–0.95)
MONOCYTES RELATIVE PERCENT: 8.9 % (ref 2–12)
NEUTROPHILS ABSOLUTE: 8.07 E9/L (ref 1.8–7.3)
NEUTROPHILS RELATIVE PERCENT: 72.4 % (ref 43–80)
NITRITE, URINE: NEGATIVE
PDW BLD-RTO: 13.2 FL (ref 11.5–15)
PH UA: 5.5 (ref 5–9)
PLATELET # BLD: 140 E9/L (ref 130–450)
PMV BLD AUTO: 11 FL (ref 7–12)
POTASSIUM REFLEX MAGNESIUM: 4.2 MMOL/L (ref 3.5–5)
PROTEIN UA: NEGATIVE MG/DL
RBC # BLD: 4.83 E12/L (ref 3.8–5.8)
RBC UA: ABNORMAL /HPF (ref 0–2)
SODIUM BLD-SCNC: 140 MMOL/L (ref 132–146)
SPECIFIC GRAVITY UA: 1.02 (ref 1–1.03)
TOTAL PROTEIN: 6.6 G/DL (ref 6.4–8.3)
UROBILINOGEN, URINE: 0.2 E.U./DL
WBC # BLD: 11.1 E9/L (ref 4.5–11.5)
WBC UA: ABNORMAL /HPF (ref 0–5)

## 2023-02-08 PROCEDURE — 96375 TX/PRO/DX INJ NEW DRUG ADDON: CPT

## 2023-02-08 PROCEDURE — 6360000002 HC RX W HCPCS: Performed by: EMERGENCY MEDICINE

## 2023-02-08 PROCEDURE — 99285 EMERGENCY DEPT VISIT HI MDM: CPT

## 2023-02-08 PROCEDURE — 80053 COMPREHEN METABOLIC PANEL: CPT

## 2023-02-08 PROCEDURE — 96374 THER/PROPH/DIAG INJ IV PUSH: CPT

## 2023-02-08 PROCEDURE — 85025 COMPLETE CBC W/AUTO DIFF WBC: CPT

## 2023-02-08 PROCEDURE — 81001 URINALYSIS AUTO W/SCOPE: CPT

## 2023-02-08 PROCEDURE — 83690 ASSAY OF LIPASE: CPT

## 2023-02-08 PROCEDURE — 74177 CT ABD & PELVIS W/CONTRAST: CPT

## 2023-02-08 PROCEDURE — 6360000004 HC RX CONTRAST MEDICATION: Performed by: RADIOLOGY

## 2023-02-08 PROCEDURE — 83605 ASSAY OF LACTIC ACID: CPT

## 2023-02-08 PROCEDURE — 2580000003 HC RX 258: Performed by: EMERGENCY MEDICINE

## 2023-02-08 RX ORDER — 0.9 % SODIUM CHLORIDE 0.9 %
1000 INTRAVENOUS SOLUTION INTRAVENOUS ONCE
Status: COMPLETED | OUTPATIENT
Start: 2023-02-09 | End: 2023-02-09

## 2023-02-08 RX ORDER — MORPHINE SULFATE 4 MG/ML
4 INJECTION, SOLUTION INTRAMUSCULAR; INTRAVENOUS ONCE
Status: DISCONTINUED | OUTPATIENT
Start: 2023-02-08 | End: 2023-02-10 | Stop reason: HOSPADM

## 2023-02-08 RX ORDER — METRONIDAZOLE 500 MG/100ML
500 INJECTION, SOLUTION INTRAVENOUS EVERY 8 HOURS
Status: DISCONTINUED | OUTPATIENT
Start: 2023-02-09 | End: 2023-02-10 | Stop reason: HOSPADM

## 2023-02-08 RX ORDER — ONDANSETRON 2 MG/ML
4 INJECTION INTRAMUSCULAR; INTRAVENOUS ONCE
Status: COMPLETED | OUTPATIENT
Start: 2023-02-08 | End: 2023-02-08

## 2023-02-08 RX ORDER — 0.9 % SODIUM CHLORIDE 0.9 %
1000 INTRAVENOUS SOLUTION INTRAVENOUS ONCE
Status: COMPLETED | OUTPATIENT
Start: 2023-02-08 | End: 2023-02-09

## 2023-02-08 RX ADMIN — ONDANSETRON 4 MG: 2 INJECTION INTRAMUSCULAR; INTRAVENOUS at 21:20

## 2023-02-08 RX ADMIN — IOPAMIDOL 75 ML: 755 INJECTION, SOLUTION INTRAVENOUS at 22:57

## 2023-02-08 RX ADMIN — SODIUM CHLORIDE 1000 ML: 9 INJECTION, SOLUTION INTRAVENOUS at 21:15

## 2023-02-08 ASSESSMENT — PAIN DESCRIPTION - FREQUENCY
FREQUENCY: CONTINUOUS
FREQUENCY: CONTINUOUS

## 2023-02-08 ASSESSMENT — PAIN - FUNCTIONAL ASSESSMENT: PAIN_FUNCTIONAL_ASSESSMENT: 0-10

## 2023-02-08 ASSESSMENT — PAIN SCALES - GENERAL
PAINLEVEL_OUTOF10: 8
PAINLEVEL_OUTOF10: 8

## 2023-02-08 ASSESSMENT — PAIN DESCRIPTION - DESCRIPTORS
DESCRIPTORS: SHARP;PRESSURE;STABBING
DESCRIPTORS: SHARP;PRESSURE

## 2023-02-08 ASSESSMENT — PAIN DESCRIPTION - ORIENTATION: ORIENTATION: RIGHT;LOWER

## 2023-02-08 ASSESSMENT — PAIN DESCRIPTION - PAIN TYPE
TYPE: ACUTE PAIN
TYPE: ACUTE PAIN

## 2023-02-08 ASSESSMENT — PAIN DESCRIPTION - LOCATION
LOCATION: ABDOMEN
LOCATION: ABDOMEN

## 2023-02-08 NOTE — ED NOTES
Department of Emergency Medicine  FIRST PROVIDER TRIAGE NOTE             Independent MLP           2/8/23  6:47 PM EST    Date of Encounter: 2/8/23   MRN: [id number]      HPI: Michell Sams is a 67 y.o. male who presents to the ED for Abdominal Pain (RLQ pain, started a couple days ago, worse last night ) and Nausea  She complains of right lower quadrant abdominal pain which began yesterday. States the pain was worse last night. States that today's began to have nausea. ROS: Negative for cp or sob. PE: Gen Appearance/Constitutional: alert  CV: regular rate  Pulm: CTA bilat     Initial Plan of Care: All treatment areas with department are currently occupied. Plan to order/Initiate the following while awaiting opening in ED: labs.   Initiate Treatment-Testing, Proceed toTreatment Area When Bed Available for ED Attending/MLP to Continue Care    Electronically signed by CHRISTEN Caba CNP   DD: 2/8/23       CHRISTEN Caba CNP  02/08/23 [id number]

## 2023-02-09 ENCOUNTER — ANESTHESIA (OUTPATIENT)
Dept: OPERATING ROOM | Age: 73
End: 2023-02-09
Payer: MEDICARE

## 2023-02-09 ENCOUNTER — ANESTHESIA EVENT (OUTPATIENT)
Dept: OPERATING ROOM | Age: 73
End: 2023-02-09
Payer: MEDICARE

## 2023-02-09 PROBLEM — K35.80 ACUTE APPENDICITIS: Status: ACTIVE | Noted: 2023-02-09

## 2023-02-09 PROCEDURE — 7100000001 HC PACU RECOVERY - ADDTL 15 MIN: Performed by: SURGERY

## 2023-02-09 PROCEDURE — 87102 FUNGUS ISOLATION CULTURE: CPT

## 2023-02-09 PROCEDURE — 87070 CULTURE OTHR SPECIMN AEROBIC: CPT

## 2023-02-09 PROCEDURE — 2580000003 HC RX 258: Performed by: NURSE ANESTHETIST, CERTIFIED REGISTERED

## 2023-02-09 PROCEDURE — 87116 MYCOBACTERIA CULTURE: CPT

## 2023-02-09 PROCEDURE — 3700000000 HC ANESTHESIA ATTENDED CARE: Performed by: SURGERY

## 2023-02-09 PROCEDURE — 2500000003 HC RX 250 WO HCPCS: Performed by: EMERGENCY MEDICINE

## 2023-02-09 PROCEDURE — 6370000000 HC RX 637 (ALT 250 FOR IP)

## 2023-02-09 PROCEDURE — 2580000003 HC RX 258: Performed by: EMERGENCY MEDICINE

## 2023-02-09 PROCEDURE — 2500000003 HC RX 250 WO HCPCS: Performed by: NURSE ANESTHETIST, CERTIFIED REGISTERED

## 2023-02-09 PROCEDURE — 87206 SMEAR FLUORESCENT/ACID STAI: CPT

## 2023-02-09 PROCEDURE — 87081 CULTURE SCREEN ONLY: CPT

## 2023-02-09 PROCEDURE — 2709999900 HC NON-CHARGEABLE SUPPLY: Performed by: SURGERY

## 2023-02-09 PROCEDURE — 6360000002 HC RX W HCPCS: Performed by: NURSE ANESTHETIST, CERTIFIED REGISTERED

## 2023-02-09 PROCEDURE — 6360000002 HC RX W HCPCS: Performed by: SURGERY

## 2023-02-09 PROCEDURE — 3700000001 HC ADD 15 MINUTES (ANESTHESIA): Performed by: SURGERY

## 2023-02-09 PROCEDURE — 1200000000 HC SEMI PRIVATE

## 2023-02-09 PROCEDURE — 87077 CULTURE AEROBIC IDENTIFY: CPT

## 2023-02-09 PROCEDURE — 0DTJ4ZZ RESECTION OF APPENDIX, PERCUTANEOUS ENDOSCOPIC APPROACH: ICD-10-PCS | Performed by: SURGERY

## 2023-02-09 PROCEDURE — 6370000000 HC RX 637 (ALT 250 FOR IP): Performed by: STUDENT IN AN ORGANIZED HEALTH CARE EDUCATION/TRAINING PROGRAM

## 2023-02-09 PROCEDURE — 2580000003 HC RX 258: Performed by: STUDENT IN AN ORGANIZED HEALTH CARE EDUCATION/TRAINING PROGRAM

## 2023-02-09 PROCEDURE — 3600000014 HC SURGERY LEVEL 4 ADDTL 15MIN: Performed by: SURGERY

## 2023-02-09 PROCEDURE — 87205 SMEAR GRAM STAIN: CPT

## 2023-02-09 PROCEDURE — 99223 1ST HOSP IP/OBS HIGH 75: CPT | Performed by: SURGERY

## 2023-02-09 PROCEDURE — 3600000004 HC SURGERY LEVEL 4 BASE: Performed by: SURGERY

## 2023-02-09 PROCEDURE — 87075 CULTR BACTERIA EXCEPT BLOOD: CPT

## 2023-02-09 PROCEDURE — 87186 SC STD MICRODIL/AGAR DIL: CPT

## 2023-02-09 PROCEDURE — 6360000002 HC RX W HCPCS: Performed by: EMERGENCY MEDICINE

## 2023-02-09 PROCEDURE — 2700000000 HC OXYGEN THERAPY PER DAY

## 2023-02-09 PROCEDURE — 2720000010 HC SURG SUPPLY STERILE: Performed by: SURGERY

## 2023-02-09 PROCEDURE — 7100000000 HC PACU RECOVERY - FIRST 15 MIN: Performed by: SURGERY

## 2023-02-09 RX ORDER — MEPERIDINE HYDROCHLORIDE 25 MG/ML
12.5 INJECTION INTRAMUSCULAR; INTRAVENOUS; SUBCUTANEOUS EVERY 5 MIN PRN
Status: DISCONTINUED | OUTPATIENT
Start: 2023-02-09 | End: 2023-02-10 | Stop reason: HOSPADM

## 2023-02-09 RX ORDER — ONDANSETRON 2 MG/ML
INJECTION INTRAMUSCULAR; INTRAVENOUS PRN
Status: DISCONTINUED | OUTPATIENT
Start: 2023-02-09 | End: 2023-02-09 | Stop reason: SDUPTHER

## 2023-02-09 RX ORDER — LOSARTAN POTASSIUM 50 MG/1
100 TABLET ORAL DAILY
Status: DISCONTINUED | OUTPATIENT
Start: 2023-02-09 | End: 2023-02-10 | Stop reason: HOSPADM

## 2023-02-09 RX ORDER — SUCRALFATE 1 G/1
1 TABLET ORAL EVERY 6 HOURS PRN
Status: DISCONTINUED | OUTPATIENT
Start: 2023-02-09 | End: 2023-02-10 | Stop reason: HOSPADM

## 2023-02-09 RX ORDER — ROCURONIUM BROMIDE 10 MG/ML
INJECTION, SOLUTION INTRAVENOUS PRN
Status: DISCONTINUED | OUTPATIENT
Start: 2023-02-09 | End: 2023-02-09 | Stop reason: SDUPTHER

## 2023-02-09 RX ORDER — HYDRALAZINE HYDROCHLORIDE 20 MG/ML
10 INJECTION INTRAMUSCULAR; INTRAVENOUS
Status: DISCONTINUED | OUTPATIENT
Start: 2023-02-09 | End: 2023-02-10 | Stop reason: HOSPADM

## 2023-02-09 RX ORDER — SUCCINYLCHOLINE/SOD CL,ISO/PF 200MG/10ML
SYRINGE (ML) INTRAVENOUS PRN
Status: DISCONTINUED | OUTPATIENT
Start: 2023-02-09 | End: 2023-02-09 | Stop reason: SDUPTHER

## 2023-02-09 RX ORDER — SODIUM CHLORIDE 0.9 % (FLUSH) 0.9 %
5-40 SYRINGE (ML) INJECTION PRN
Status: DISCONTINUED | OUTPATIENT
Start: 2023-02-09 | End: 2023-02-10 | Stop reason: HOSPADM

## 2023-02-09 RX ORDER — ONDANSETRON 4 MG/1
4 TABLET, ORALLY DISINTEGRATING ORAL EVERY 8 HOURS PRN
Status: DISCONTINUED | OUTPATIENT
Start: 2023-02-09 | End: 2023-02-10 | Stop reason: HOSPADM

## 2023-02-09 RX ORDER — SCOLOPAMINE TRANSDERMAL SYSTEM 1 MG/1
1 PATCH, EXTENDED RELEASE TRANSDERMAL ONCE
Status: DISCONTINUED | OUTPATIENT
Start: 2023-02-09 | End: 2023-02-10 | Stop reason: HOSPADM

## 2023-02-09 RX ORDER — LIDOCAINE HYDROCHLORIDE 20 MG/ML
INJECTION, SOLUTION EPIDURAL; INFILTRATION; INTRACAUDAL; PERINEURAL PRN
Status: DISCONTINUED | OUTPATIENT
Start: 2023-02-09 | End: 2023-02-09 | Stop reason: SDUPTHER

## 2023-02-09 RX ORDER — SODIUM CHLORIDE 0.9 % (FLUSH) 0.9 %
10 SYRINGE (ML) INJECTION EVERY 12 HOURS SCHEDULED
Status: DISCONTINUED | OUTPATIENT
Start: 2023-02-09 | End: 2023-02-10 | Stop reason: HOSPADM

## 2023-02-09 RX ORDER — ROSUVASTATIN CALCIUM 20 MG/1
40 TABLET, COATED ORAL NIGHTLY
Status: DISCONTINUED | OUTPATIENT
Start: 2023-02-09 | End: 2023-02-10 | Stop reason: HOSPADM

## 2023-02-09 RX ORDER — IPRATROPIUM BROMIDE AND ALBUTEROL SULFATE 2.5; .5 MG/3ML; MG/3ML
1 SOLUTION RESPIRATORY (INHALATION)
Status: DISPENSED | OUTPATIENT
Start: 2023-02-09 | End: 2023-02-10

## 2023-02-09 RX ORDER — SODIUM CHLORIDE 0.9 % (FLUSH) 0.9 %
10 SYRINGE (ML) INJECTION PRN
Status: DISCONTINUED | OUTPATIENT
Start: 2023-02-09 | End: 2023-02-10 | Stop reason: HOSPADM

## 2023-02-09 RX ORDER — SODIUM CHLORIDE 0.9 % (FLUSH) 0.9 %
5-40 SYRINGE (ML) INJECTION EVERY 12 HOURS SCHEDULED
Status: DISCONTINUED | OUTPATIENT
Start: 2023-02-09 | End: 2023-02-10 | Stop reason: HOSPADM

## 2023-02-09 RX ORDER — MIDAZOLAM HYDROCHLORIDE 2 MG/2ML
2 INJECTION, SOLUTION INTRAMUSCULAR; INTRAVENOUS
Status: ACTIVE | OUTPATIENT
Start: 2023-02-09 | End: 2023-02-10

## 2023-02-09 RX ORDER — OXYCODONE HYDROCHLORIDE 5 MG/1
5 TABLET ORAL EVERY 4 HOURS PRN
Status: DISCONTINUED | OUTPATIENT
Start: 2023-02-09 | End: 2023-02-10 | Stop reason: HOSPADM

## 2023-02-09 RX ORDER — ENOXAPARIN SODIUM 100 MG/ML
40 INJECTION SUBCUTANEOUS DAILY
Status: DISCONTINUED | OUTPATIENT
Start: 2023-02-10 | End: 2023-02-10 | Stop reason: HOSPADM

## 2023-02-09 RX ORDER — DEXAMETHASONE SODIUM PHOSPHATE 4 MG/ML
INJECTION, SOLUTION INTRA-ARTICULAR; INTRALESIONAL; INTRAMUSCULAR; INTRAVENOUS; SOFT TISSUE PRN
Status: DISCONTINUED | OUTPATIENT
Start: 2023-02-09 | End: 2023-02-09 | Stop reason: SDUPTHER

## 2023-02-09 RX ORDER — ONDANSETRON 2 MG/ML
4 INJECTION INTRAMUSCULAR; INTRAVENOUS EVERY 6 HOURS PRN
Status: DISCONTINUED | OUTPATIENT
Start: 2023-02-09 | End: 2023-02-10 | Stop reason: HOSPADM

## 2023-02-09 RX ORDER — SODIUM CHLORIDE 9 MG/ML
INJECTION, SOLUTION INTRAVENOUS CONTINUOUS PRN
Status: DISCONTINUED | OUTPATIENT
Start: 2023-02-09 | End: 2023-02-09 | Stop reason: SDUPTHER

## 2023-02-09 RX ORDER — SODIUM CHLORIDE 9 MG/ML
INJECTION, SOLUTION INTRAVENOUS PRN
Status: DISCONTINUED | OUTPATIENT
Start: 2023-02-09 | End: 2023-02-10 | Stop reason: HOSPADM

## 2023-02-09 RX ORDER — OXYCODONE HYDROCHLORIDE 5 MG/1
5 TABLET ORAL EVERY 6 HOURS PRN
Qty: 12 TABLET | Refills: 0 | Status: SHIPPED | OUTPATIENT
Start: 2023-02-09 | End: 2023-02-12

## 2023-02-09 RX ORDER — FENTANYL CITRATE 50 UG/ML
INJECTION, SOLUTION INTRAMUSCULAR; INTRAVENOUS PRN
Status: DISCONTINUED | OUTPATIENT
Start: 2023-02-09 | End: 2023-02-09 | Stop reason: SDUPTHER

## 2023-02-09 RX ORDER — SCOLOPAMINE TRANSDERMAL SYSTEM 1 MG/1
PATCH, EXTENDED RELEASE TRANSDERMAL
Status: DISCONTINUED
Start: 2023-02-09 | End: 2023-02-10 | Stop reason: HOSPADM

## 2023-02-09 RX ORDER — LABETALOL HYDROCHLORIDE 5 MG/ML
10 INJECTION, SOLUTION INTRAVENOUS
Status: DISCONTINUED | OUTPATIENT
Start: 2023-02-09 | End: 2023-02-10 | Stop reason: HOSPADM

## 2023-02-09 RX ORDER — PANTOPRAZOLE SODIUM 40 MG/1
40 TABLET, DELAYED RELEASE ORAL DAILY PRN
Status: DISCONTINUED | OUTPATIENT
Start: 2023-02-09 | End: 2023-02-10 | Stop reason: HOSPADM

## 2023-02-09 RX ORDER — SODIUM CHLORIDE, SODIUM LACTATE, POTASSIUM CHLORIDE, CALCIUM CHLORIDE 600; 310; 30; 20 MG/100ML; MG/100ML; MG/100ML; MG/100ML
INJECTION, SOLUTION INTRAVENOUS CONTINUOUS
Status: DISCONTINUED | OUTPATIENT
Start: 2023-02-09 | End: 2023-02-10 | Stop reason: HOSPADM

## 2023-02-09 RX ORDER — SODIUM CHLORIDE 9 MG/ML
25 INJECTION, SOLUTION INTRAVENOUS PRN
Status: DISCONTINUED | OUTPATIENT
Start: 2023-02-09 | End: 2023-02-10 | Stop reason: HOSPADM

## 2023-02-09 RX ORDER — HYDROCHLOROTHIAZIDE 12.5 MG/1
12.5 TABLET ORAL DAILY
Status: DISCONTINUED | OUTPATIENT
Start: 2023-02-09 | End: 2023-02-10 | Stop reason: HOSPADM

## 2023-02-09 RX ORDER — OXYCODONE HYDROCHLORIDE 5 MG/1
10 TABLET ORAL EVERY 4 HOURS PRN
Status: DISCONTINUED | OUTPATIENT
Start: 2023-02-09 | End: 2023-02-10 | Stop reason: HOSPADM

## 2023-02-09 RX ORDER — EPHEDRINE SULFATE/0.9% NACL/PF 50 MG/5 ML
SYRINGE (ML) INTRAVENOUS PRN
Status: DISCONTINUED | OUTPATIENT
Start: 2023-02-09 | End: 2023-02-09 | Stop reason: SDUPTHER

## 2023-02-09 RX ORDER — ONDANSETRON 2 MG/ML
4 INJECTION INTRAMUSCULAR; INTRAVENOUS
Status: ACTIVE | OUTPATIENT
Start: 2023-02-09 | End: 2023-02-10

## 2023-02-09 RX ORDER — LABETALOL HYDROCHLORIDE 5 MG/ML
INJECTION, SOLUTION INTRAVENOUS PRN
Status: DISCONTINUED | OUTPATIENT
Start: 2023-02-09 | End: 2023-02-09 | Stop reason: SDUPTHER

## 2023-02-09 RX ORDER — PROPOFOL 10 MG/ML
INJECTION, EMULSION INTRAVENOUS PRN
Status: DISCONTINUED | OUTPATIENT
Start: 2023-02-09 | End: 2023-02-09 | Stop reason: SDUPTHER

## 2023-02-09 RX ORDER — TAMSULOSIN HYDROCHLORIDE 0.4 MG/1
0.4 CAPSULE ORAL 2 TIMES DAILY
Status: DISCONTINUED | OUTPATIENT
Start: 2023-02-09 | End: 2023-02-10 | Stop reason: HOSPADM

## 2023-02-09 RX ADMIN — DEXAMETHASONE SODIUM PHOSPHATE 10 MG: 4 INJECTION, SOLUTION INTRAMUSCULAR; INTRAVENOUS at 06:40

## 2023-02-09 RX ADMIN — SUGAMMADEX 250 MG: 100 INJECTION, SOLUTION INTRAVENOUS at 07:37

## 2023-02-09 RX ADMIN — FENTANYL CITRATE 25 MCG: 50 INJECTION, SOLUTION INTRAMUSCULAR; INTRAVENOUS at 06:34

## 2023-02-09 RX ADMIN — PROPOFOL 180 MG: 10 INJECTION, EMULSION INTRAVENOUS at 06:37

## 2023-02-09 RX ADMIN — ROCURONIUM BROMIDE 20 MG: 10 INJECTION, SOLUTION INTRAVENOUS at 06:37

## 2023-02-09 RX ADMIN — LABETALOL HYDROCHLORIDE 2.5 MG: 5 INJECTION INTRAVENOUS at 07:28

## 2023-02-09 RX ADMIN — SODIUM CHLORIDE 1000 ML: 9 INJECTION, SOLUTION INTRAVENOUS at 00:15

## 2023-02-09 RX ADMIN — LOSARTAN POTASSIUM 100 MG: 50 TABLET, FILM COATED ORAL at 15:48

## 2023-02-09 RX ADMIN — Medication 160 MG: at 06:37

## 2023-02-09 RX ADMIN — ONDANSETRON 4 MG: 2 INJECTION INTRAMUSCULAR; INTRAVENOUS at 06:40

## 2023-02-09 RX ADMIN — HYDROMORPHONE HYDROCHLORIDE 0.5 MG: 1 INJECTION, SOLUTION INTRAMUSCULAR; INTRAVENOUS; SUBCUTANEOUS at 10:15

## 2023-02-09 RX ADMIN — ROCURONIUM BROMIDE 30 MG: 10 INJECTION, SOLUTION INTRAVENOUS at 06:40

## 2023-02-09 RX ADMIN — LIDOCAINE HYDROCHLORIDE 100 MG: 20 INJECTION, SOLUTION EPIDURAL; INFILTRATION; INTRACAUDAL; PERINEURAL at 06:37

## 2023-02-09 RX ADMIN — SODIUM CHLORIDE: 9 INJECTION, SOLUTION INTRAVENOUS at 07:16

## 2023-02-09 RX ADMIN — TAMSULOSIN HYDROCHLORIDE 0.4 MG: 0.4 CAPSULE ORAL at 21:19

## 2023-02-09 RX ADMIN — METRONIDAZOLE 500 MG: 500 INJECTION, SOLUTION INTRAVENOUS at 00:22

## 2023-02-09 RX ADMIN — SERTRALINE HYDROCHLORIDE 25 MG: 50 TABLET ORAL at 21:20

## 2023-02-09 RX ADMIN — FENTANYL CITRATE 50 MCG: 50 INJECTION, SOLUTION INTRAMUSCULAR; INTRAVENOUS at 07:00

## 2023-02-09 RX ADMIN — HYDROCHLOROTHIAZIDE 12.5 MG: 12.5 TABLET ORAL at 15:48

## 2023-02-09 RX ADMIN — FENTANYL CITRATE 25 MCG: 50 INJECTION, SOLUTION INTRAMUSCULAR; INTRAVENOUS at 06:37

## 2023-02-09 RX ADMIN — ROSUVASTATIN CALCIUM 40 MG: 20 TABLET, FILM COATED ORAL at 21:19

## 2023-02-09 RX ADMIN — WATER 2000 MG: 1 INJECTION INTRAMUSCULAR; INTRAVENOUS; SUBCUTANEOUS at 00:16

## 2023-02-09 RX ADMIN — METRONIDAZOLE 500 MG: 500 INJECTION, SOLUTION INTRAVENOUS at 15:53

## 2023-02-09 RX ADMIN — METRONIDAZOLE 500 MG: 500 INJECTION, SOLUTION INTRAVENOUS at 09:24

## 2023-02-09 RX ADMIN — SODIUM CHLORIDE: 9 INJECTION, SOLUTION INTRAVENOUS at 06:33

## 2023-02-09 RX ADMIN — PROPOFOL 20 MG: 10 INJECTION, EMULSION INTRAVENOUS at 07:00

## 2023-02-09 RX ADMIN — SODIUM CHLORIDE, POTASSIUM CHLORIDE, SODIUM LACTATE AND CALCIUM CHLORIDE: 600; 310; 30; 20 INJECTION, SOLUTION INTRAVENOUS at 11:12

## 2023-02-09 RX ADMIN — OXYCODONE HYDROCHLORIDE 5 MG: 5 TABLET ORAL at 14:08

## 2023-02-09 RX ADMIN — Medication 20 MG: at 06:53

## 2023-02-09 ASSESSMENT — ENCOUNTER SYMPTOMS
COUGH: 0
CONSTIPATION: 0
DIARRHEA: 0
ABDOMINAL PAIN: 1
SHORTNESS OF BREATH: 0
COLOR CHANGE: 0
VOMITING: 0
NAUSEA: 1
ABDOMINAL DISTENTION: 1
TROUBLE SWALLOWING: 0
FACIAL SWELLING: 0

## 2023-02-09 ASSESSMENT — PAIN DESCRIPTION - ORIENTATION
ORIENTATION: MID
ORIENTATION: RIGHT
ORIENTATION: RIGHT;LOWER
ORIENTATION: RIGHT

## 2023-02-09 ASSESSMENT — PAIN DESCRIPTION - LOCATION
LOCATION: ABDOMEN

## 2023-02-09 ASSESSMENT — PAIN DESCRIPTION - ONSET
ONSET: ON-GOING
ONSET: GRADUAL

## 2023-02-09 ASSESSMENT — PAIN DESCRIPTION - DESCRIPTORS
DESCRIPTORS: SORE;SHARP
DESCRIPTORS: ACHING;SHARP;SORE
DESCRIPTORS: SORE
DESCRIPTORS: ACHING;DISCOMFORT;SORE;TENDER
DESCRIPTORS: ACHING;SORE;SHARP

## 2023-02-09 ASSESSMENT — PAIN - FUNCTIONAL ASSESSMENT
PAIN_FUNCTIONAL_ASSESSMENT: PREVENTS OR INTERFERES SOME ACTIVE ACTIVITIES AND ADLS
PAIN_FUNCTIONAL_ASSESSMENT: ACTIVITIES ARE NOT PREVENTED
PAIN_FUNCTIONAL_ASSESSMENT: 0-10
PAIN_FUNCTIONAL_ASSESSMENT: PREVENTS OR INTERFERES SOME ACTIVE ACTIVITIES AND ADLS
PAIN_FUNCTIONAL_ASSESSMENT: PREVENTS OR INTERFERES SOME ACTIVE ACTIVITIES AND ADLS

## 2023-02-09 ASSESSMENT — PAIN SCALES - GENERAL
PAINLEVEL_OUTOF10: 3
PAINLEVEL_OUTOF10: 5
PAINLEVEL_OUTOF10: 7
PAINLEVEL_OUTOF10: 6
PAINLEVEL_OUTOF10: 6
PAINLEVEL_OUTOF10: 0
PAINLEVEL_OUTOF10: 3

## 2023-02-09 ASSESSMENT — PAIN DESCRIPTION - FREQUENCY
FREQUENCY: INTERMITTENT
FREQUENCY: CONTINUOUS

## 2023-02-09 ASSESSMENT — PAIN DESCRIPTION - PAIN TYPE
TYPE: SURGICAL PAIN
TYPE: ACUTE PAIN

## 2023-02-09 NOTE — H&P
GENERAL SURGERY  HISTORY AND PHYSICAL  2/9/2023      [facility]  Pablo Barnes is a 67 y.o. male who presents to the general surgery service for evaluation of abdominal pain. The patient reports 2-3 days of RLQ pain. He has never had pain like this before. Some associated nausea without vomiting. No fevers or chills. No prior abdominal surgery. He takes daily aspirin, but no other blood thinning medications. Medical history is significant for CAD s/p stenting. He  reports that he has never smoked. He has never used smokeless tobacco. He reports that he does not drink alcohol and does not use drugs. Past Medical History:   Diagnosis Date    CAD (coronary artery disease)     with stents - Dr. Viki Epstein yearly     GERD (gastroesophageal reflux disease)     for EGD 5-22-20     Hypertension     Insomnia     Spondylosis     L4&5    Stress     mild- work related     Ventricular ectopic beats 4/12/2013       Past Surgical History:   Procedure Laterality Date    COLONOSCOPY      CORONARY ANGIOPLASTY      with stent 2011     DIAGNOSTIC CARDIAC CATH LAB PROCEDURE      ENDOSCOPY, COLON, DIAGNOSTIC      JOINT REPLACEMENT Right 1990, 11/2011    hip x2    JOINT REPLACEMENT Left 1990, 2007    hip x2     TONSILLECTOMY      UPPER GASTROINTESTINAL ENDOSCOPY N/A 5/22/2020    EGD BIOPSY performed by Jon Barfield MD at [address]       Prior to Admission medications    Medication Sig Start Date End Date Taking?  Authorizing Provider   finasteride (PROSCAR) 5 MG tablet  1/23/23   Historical Provider, MD   hydroCHLOROthiazide (HYDRODIURIL) 12.5 MG tablet Take 1 tablet by mouth daily 2/2/23   Corey Green DO   hydroCHLOROthiazide (HYDRODIURIL) 12.5 MG tablet Take 1 tablet by mouth daily 2/2/23 3/4/23  Corey Green DO   tamsulosin (FLOMAX) 0.4 MG capsule TAKE 1 CAPSULE TWICE DAILY 1/4/23   Corey Green DO   sertraline (ZOLOFT) 25 MG tablet TAKE 1 TABLET NIGHTLY (DOSECHANGE-DEACTIVATED 50MG) 1/4/23 [employer], DO   olmesartan (BENICAR) 40 MG tablet TAKE 1 TABLET DAILY 1/4/23   Clyde Brown DO   NONFORMULARY Alpha lopoic acid    Historical Provider, MD   dutasteride (AVODART) 0.5 MG capsule TAKE ONE CAPSULE BY MOUTH ONCE A WEEK  Patient not taking: No sig reported 5/26/22   Historical Provider, MD   vitamin D3 (CHOLECALCIFEROL) 125 MCG (5000 UT) TABS tablet Take 0.5 tablets by mouth daily 6/22/22   Corey Green DO   rosuvastatin (CRESTOR) 40 MG tablet Take 1 tablet by mouth daily 6/22/22   Corey Green DO   pantoprazole (PROTONIX) 40 MG tablet Take 1 tablet by mouth daily as needed (heartburn) 3/1/22   Corey Green DO   Coenzyme Q10 (COQ10) 100 MG CAPS Take 100 mg by mouth daily    Historical Provider, MD   aspirin 81 MG EC tablet Take 81 mg by mouth daily    Historical Provider, MD   clonazePAM (KLONOPIN) 0.5 MG tablet Take 1 tablet by mouth nightly as needed for Anxiety for up to 180 days. 8/20/20 2/8/23  Corey Green DO   amoxicillin (AMOXIL) 250 MG capsule Take 250 mg by mouth daily as needed Takes pre-op for dental procedures, h/o bilat hip arthoplasty    Historical Provider, MD   sucralfate (CARAFATE) 1 GM tablet Take 1 g by mouth 4 times daily Takes as needed    Historical Provider, MD   magnesium oxide (MAG-OX) 400 MG tablet Take 400 mg by mouth 2 times daily. Historical Provider, MD       No Known Allergies    Family History   Problem Relation Age of Onset    Diabetes Mother     Cancer Father          Review of Systems   Review of Systems   Constitutional:  Positive for appetite change. Negative for chills, fatigue and fever. HENT:  Negative for facial swelling and trouble swallowing. Respiratory:  Negative for cough and shortness of breath. Cardiovascular:  Negative for chest pain and palpitations. Gastrointestinal:  Positive for abdominal distention, abdominal pain and nausea. Negative for constipation, diarrhea and vomiting.    Endocrine: Negative for polydipsia and polyphagia. Genitourinary:  Negative for difficulty urinating and dysuria. Skin:  Negative for color change and rash. Neurological:  Negative for dizziness and weakness. Psychiatric/Behavioral:  Negative for agitation, behavioral problems and confusion. PHYSICAL EXAM:    Vitals:    02/09/23 0115   BP: (!) 154/88   Pulse:    Resp:    Temp:    SpO2:        General Appearance:  awake, alert, oriented, in no acute distress  Skin:  Skin color, texture, turgor normal. No rashes or lesions. Head/face:  NCAT  Eyes:  No gross abnormalities. Sclera nonicteric  Lungs/Chest:  Normal expansion. No respiratory distress. On room air. No chest wall tenderness  Heart: Warm throughout. Regular rate   Abdomen:  Soft, moderate RLQ tenderness with focal guarding, mildly distended. Musculoskeletal: Extremities warm to touch, pink. LABS:  CBC  Recent Labs     02/08/23  1817   WBC 11.1   HGB 13.7        BMP  Recent Labs     02/08/23  1817      K 4.2      CO2 27   BUN 21   CREATININE 1.1   CALCIUM 9.0     Liver Function  Recent Labs     02/08/23  1817   LIPASE 38   BILITOT 0.9   AST 16   ALT 11   ALKPHOS 64   PROT 6.6   LABALBU 3.8     No results for input(s): LACTATE in the last 72 hours. No results for input(s): INR, PTT in the last 72 hours. Invalid input(s): PT      RADIOLOGY: I reviewed all relevant imaging from this admission as well as the past studies available in the EMR including: CT abdomen/pelvis from 2/8/23    My assessment of the reviewed imaging is dilated appendix with surrounding stranding      ASSESSMENT:  67 y.o. male with acute appendicitis    PLAN:  NPO  IVF  IV Rocephin/Flagyl  pain/nausea control PRN  Laparoscopic appendectomy, timing to be discussed with attending    Electronically signed by Megan May DO on 2/9/23 at 5:50 AM EST     I saw and examined the patient. I reviewed the above resident's note.  I reviewed all labs, radiology, and all test results listed above. I agree with the assessment and plan as outlined. Laparoscopic possible open appendectomy    I explained the risks (including but not limited to leakage from the staple line, bleeding, abscess, sepsis, bowel or major blood vessel injury, hernia), benefits, alternatives, and potential complications associated with the above procedure to be performed and transfusions when applicable with the patient/responsible person prior to the procedure. All of the patient's questions were answered. The patient understands and agrees to surgery.       Rey Abreu MD  General Surgery

## 2023-02-09 NOTE — CARE COORDINATION
Met with patient about diagnosis and discharge plan of care. Pt post op lap appendectomy with perf. Iv fluids, iv flagyl. Regular diet. Pt up in chair. Lives with spouse in 2 story home. Independent prior to admit. No needs for home. PCP is Dr Eli Mcleod. Plan is home with no needs-mjo    The Plan for Transition of Care is related to the following treatment goals: home     The Patient and/or patient representative pt was provided with a choice of provider and agrees   with the discharge plan. [x] Yes [] No    Freedom of choice list was provided with basic dialogue that supports the patient's individualized plan of care/goals, treatment preferences and shares the quality data associated with the providers.  [x] Yes [] No

## 2023-02-09 NOTE — OP NOTE
Operative Note: Laparoscopic Appendectomy    Raiza Hillman     DATE OF PROCEDURE: 2/9/2023  Surgeon:  Felix Smith):  Jelly Garcia MD  PREOPERATIVE DIAGNOSIS: Acute appendicitis   POSTOPERATIVE DIAGNOSIS: Acute appendicitis with perforation  OPERATION: Laparoscopic appendectomy  ANESTHESIA: General endotracheal.   Estimated Blood Loss: less than 50   Specimens:  ID Type Source Tests Collected by Time Destination   1 : Abscess  Body Fluid Fluid CULTURE, ANAEROBIC, CULTURE, FUNGUS, GRAM STAIN, CULTURE, SURGICAL, CULTURE WITH SMEAR, ACID FAST Regi Rudolph MD 2/9/2023 0710    A : APPENDIX  Tissue Appendix SURGICAL PATHOLOGY Jelly Garcia MD 4/3/1201 3272      COMPLICATIONS: None. HISTORY: Tra Ornelas is a  67 y.o.  male, who presented to the ED with acute appendicitis. Surgery was explained to the patient, as well as risks, benefits, alternatives, and possible complications. He understood the risks involved in the surgery and wished to proceed. OPERATION: The patient was placed on the table and placed under general anesthesia. Abdomen was prepped with ChloraPrep and draped in the usual sterile fashion. A stab incision was made at Giron's point. A Veress needle was used to insufflate the abdomen with CO2. A 5 mm incision was made in the periumbilical region and a 5 mm trocar was placed. There was good visualization. The appendix did appear swollen in the right lower quadrant. A 5 mm trocar was placed in the suprapubic region and a 10 mm port was placed in the LLQ. Atraumatic graspers were then used to grasp the appendix. It was large and inflamed and adhered to the adjacent bowel. It took careful dissection to dissect it free. There was also a small abscess cavity indicating perforation. This was sent for cultures and was irrigated. The mesoappendix was ligated first with the Ligasure to the base of the appendix, which was not inflamed.  Then, the Ethicon linear 45 with a blue cartridge was fired across to the base of the appendix right on the cecum where there was no swelling. The swollen appendix was placed into a pouch and brought out through the 10 mm trocar site. The abdomen was then well irrigated. All the fluid was removed with suction. There was good hemostasis. A 15 F EDMAR drain was placed in the RLQ through the suprapubic port and sutured in place with a Nylon drain stitch. The fascia of the LLQ incision was then closed with a Duyen Jorge device with a 2-0 Vicryl suture. All trocars were removed under direct vision. The skin wounds were then closed with 4-0 Monocryl subcuticular stitches and skin glue dressings were applied. The needle and sponge count were correct. The patient tolerated the procedure well and went to recovery in stable condition. Physician Signature: Electronically signed by Dr. Ardis Phalen, MD, M.D. FACS    Send copy of H&P to PCP, Kalie Connors DO and referring physician, No ref.  provider found

## 2023-02-09 NOTE — PROGRESS NOTES
Nursing Transfer Note    Data:  Summary of patients progress: Dr Karrie juarez  Reason for transfer: next level of care    Action:  Explained reason for transfer to Patient and Family. Report given to: Luis Zarate, using RN Handoff Navigator.   Mode of transportation: bed    Response:  RN Recommendations: pain mgt

## 2023-02-09 NOTE — PLAN OF CARE
Problem: Discharge Planning  Goal: Discharge to home or other facility with appropriate resources  2/9/2023 1143 by Vicki Cristobal RN  Outcome: Progressing  2/9/2023 0231 by Ana María Kim  Outcome: Progressing  Flowsheets (Taken 2/9/2023 0127 by Hermelinda Layton RN)  Discharge to home or other facility with appropriate resources: Identify barriers to discharge with patient and caregiver     Problem: Pain  Goal: Verbalizes/displays adequate comfort level or baseline comfort level  2/9/2023 1143 by Vicki Cristobal RN  Outcome: Progressing  2/9/2023 0231 by Ana María Kim  Outcome: Progressing  Flowsheets (Taken 2/9/2023 0231)  Verbalizes/displays adequate comfort level or baseline comfort level:   Encourage patient to monitor pain and request assistance   Assess pain using appropriate pain scale   Implement non-pharmacological measures as appropriate and evaluate response     Problem: Safety - Adult  Goal: Free from fall injury  2/9/2023 1143 by Vicki Cristobal RN  Outcome: Progressing  2/9/2023 0231 by Ana María Kim  Outcome: Progressing     Problem: ABCDS Injury Assessment  Goal: Absence of physical injury  2/9/2023 1143 by Vicki Cirstobal RN  Outcome: Progressing  2/9/2023 0231 by Ana María Kim  Outcome: Progressing

## 2023-02-09 NOTE — PLAN OF CARE
Problem: Discharge Planning  Goal: Discharge to home or other facility with appropriate resources  Outcome: Progressing  Flowsheets (Taken 2/9/2023 0127 by Enrique Arboleda RN)  Discharge to home or other facility with appropriate resources: Identify barriers to discharge with patient and caregiver     Problem: Pain  Goal: Verbalizes/displays adequate comfort level or baseline comfort level  Outcome: Progressing  Flowsheets (Taken 2/9/2023 0231)  Verbalizes/displays adequate comfort level or baseline comfort level:   Encourage patient to monitor pain and request assistance   Assess pain using appropriate pain scale   Implement non-pharmacological measures as appropriate and evaluate response     Problem: Safety - Adult  Goal: Free from fall injury  Outcome: Progressing     Problem: ABCDS Injury Assessment  Goal: Absence of physical injury  Outcome: Progressing

## 2023-02-09 NOTE — ED PROVIDER NOTES
[address]        Pt Name: Adrian Buck  MRN: [id number]  [place] 1950  Date of evaluation: 2/8/2023  Provider: Saqib Rogel DO  PCP: Sangita Birmingham DO  Note Started: 9:04 PM EST 2/8/23    CHIEF COMPLAINT       Chief Complaint   Patient presents with    Abdominal Pain     RLQ pain, started a couple days ago, worse last night     Nausea       HISTORY OF PRESENT ILLNESS: 1 or more Elements   History From: Patient    Limitations to history : None    Adrian Buck is a 67 y.o. male who presents with worsening onset of right lower quadrant abdominal pain that began yesterday. It woke him up from sleep yesterday, has been coming and going throughout the day, worsened this evening. Associated with nausea. He is not having fevers or chills, denies chest pain, shortness of breath, no diarrhea or constipation. He has never had surgeries on his abdomen in the past.  He does have history of BPH and chronically has a difficult time urinating although he has not been experiencing any difficulty increase from baseline, no dysuria or hematuria. Nothing is made his lower quadrant radha pain better or worse, no other associations. Nursing Notes were all reviewed and agreed with or any disagreements were addressed in the HPI. REVIEW OF SYSTEMS :      Positives and Pertinent negatives as per HPI.      SURGICAL HISTORY     Past Surgical History:   Procedure Laterality Date    COLONOSCOPY      CORONARY ANGIOPLASTY      with stent 2011     DIAGNOSTIC CARDIAC CATH LAB PROCEDURE      ENDOSCOPY, COLON, DIAGNOSTIC      JOINT REPLACEMENT Right 1990, 11/2011    hip x2    JOINT REPLACEMENT Left 1990, 2007    hip x2     LAPAROSCOPIC APPENDECTOMY N/A 2/9/2023    APPENDECTOMY LAPAROSCOPIC performed by Juan Antonio Lugo MD at [place] [address] N/A 5/22/2020    EGD BIOPSY performed by Juan Antonio Lugo MD at [facility] ENDOSCOPY       CURRENTMEDICATIONS       Discharge Medication List as of 2/10/2023 11:15 AM        CONTINUE these medications which have NOT CHANGED    Details   !! hydroCHLOROthiazide (HYDRODIURIL) 12.5 MG tablet Take 1 tablet by mouth daily, Disp-90 tablet, R-1Normal      !! hydroCHLOROthiazide (HYDRODIURIL) 12.5 MG tablet Take 1 tablet by mouth daily, Disp-30 tablet, R-0Normal      tamsulosin (FLOMAX) 0.4 MG capsule TAKE 1 CAPSULE TWICE DAILY, Disp-180 capsule, R-1Normal      sertraline (ZOLOFT) 25 MG tablet TAKE 1 TABLET NIGHTLY (DOSECHANGE-DEACTIVATED 50MG), Disp-90 tablet, R-1Normal      olmesartan (BENICAR) 40 MG tablet TAKE 1 TABLET DAILY, Disp-90 tablet, R-1Normal      NONFORMULARY Alpha lopoic acidHistorical Med      vitamin D3 (CHOLECALCIFEROL) 125 MCG (5000 UT) TABS tablet Take 0.5 tablets by mouth daily, Disp-30 tablet, R-5NO PRINT      rosuvastatin (CRESTOR) 40 MG tablet Take 1 tablet by mouth daily, Disp-90 tablet, R-1Normal      pantoprazole (PROTONIX) 40 MG tablet Take 1 tablet by mouth daily as needed (heartburn), Disp-90 tablet, R-1Normal      Coenzyme Q10 (COQ10) 100 MG CAPS Take 100 mg by mouth dailyHistorical Med      aspirin 81 MG EC tablet Take 81 mg by mouth dailyHistorical Med      clonazePAM (KLONOPIN) 0.5 MG tablet Take 1 tablet by mouth nightly as needed for Anxiety for up to 180 days. , Disp-30 tablet, R-0Print      amoxicillin (AMOXIL) 250 MG capsule Take 250 mg by mouth daily as needed Takes pre-op for dental procedures, h/o bilat hip arthoplastyHistorical Med      sucralfate (CARAFATE) 1 GM tablet Take 1 g by mouth 4 times daily Takes as neededHistorical Med      magnesium oxide (MAG-OX) 400 MG tablet Take 400 mg by mouth 2 times daily. Historical Med       !! - Potential duplicate medications found. Please discuss with provider. ALLERGIES     Patient has no known allergies.     FAMILYHISTORY       Family History   Problem Relation Age of Onset    Diabetes Mother     Cancer Father         SOCIAL HISTORY       Social History     Tobacco Use    Smoking status: Never    Smokeless tobacco: Never   Vaping Use    Vaping Use: Never used   Substance Use Topics    Alcohol use: No     Comment: rarely    Drug use: Never       SCREENINGS        [place] Coma Scale  Eye Opening: Spontaneous  Best Verbal Response: Oriented  Best Motor Response: Obeys commands  [place] Coma Scale Score: 15                CIWA Assessment  BP: 127/67  Heart Rate: 50           PHYSICAL EXAM  1 or more Elements     ED Triage Vitals [02/08/23 1846]   BP Temp Temp Source Heart Rate Resp SpO2 Height Weight   (!) 155/79 99.3 °F (37.4 °C) Oral 51 16 97 % 6' 1\" (1.854 m) 205 lb (93 kg)     Constitutional/General: Alert and oriented x3  Head: Normocephalic and atraumatic  Eyes: PERRL, EOMI, conjunctiva normal, sclera non icteric  ENT:  Oropharynx clear, handling secretions  Neck: Supple, full ROM, no stridor  Respiratory: Lungs clear to auscultation bilaterally, no wheezes, rales, or rhonchi. Not in respiratory distress  Cardiovascular:  Regular rate. Regular rhythm. 2+ distal pulses. Equal extremity pulses. Chest: No chest wall tenderness  GI: Right lower quadrant tenderness to palpation with guarding, nondistended, rest of abdomen is soft   musculoskeletal: Moves all extremities x 4. Warm and well perfused, no cyanosis, no edema. Capillary refill <3 seconds  Integument: skin warm and dry. No rashes.    Neurologic: GCS 15, no focal deficits, symmetric strength 5/5 in the upper and lower extremities bilaterally  Psychiatric: Normal Affect      DIAGNOSTIC RESULTS   LABS:    Labs Reviewed   CULTURE, SURGICAL - Abnormal; Notable for the following components:       Result Value    Organism Gram negative soraida (*)     All other components within normal limits    Narrative:     Source: ABSC       Site:              URINALYSIS WITH MICROSCOPIC - Abnormal; Notable for the following components:    Ketones, Urine TRACE (*)     All other components within normal limits   CBC WITH AUTO DIFFERENTIAL - Abnormal; Notable for the following components:    Lymphocytes % 17.0 (*)     Neutrophils Absolute 8.07 (*)     Monocytes Absolute 0.99 (*)     All other components within normal limits   BASIC METABOLIC PANEL W/ REFLEX TO MG FOR LOW K - Abnormal; Notable for the following components:    Glucose 143 (*)     BUN 25 (*)     Calcium 8.4 (*)     All other components within normal limits   CBC WITH AUTO DIFFERENTIAL - Abnormal; Notable for the following components:    WBC 12.4 (*)     Neutrophils % 86.3 (*)     Lymphocytes % 6.0 (*)     Neutrophils Absolute 10.74 (*)     Lymphocytes Absolute 0.74 (*)     Eosinophils Absolute 0.02 (*)     All other components within normal limits   CULTURE, MRSA, SCREENING    Narrative:     Source: NARES       Site: &Nares             GRAM STAIN    Narrative:     Source: ABSC       Site:              CULTURE, ANAEROBIC   CULTURE, FUNGUS   CULTURE WITH SMEAR, ACID FAST BACILLIUS   COMPREHENSIVE METABOLIC PANEL W/ REFLEX TO MG FOR LOW K   LIPASE   LACTIC ACID   BASIC METABOLIC PANEL W/ REFLEX TO MG FOR LOW K   CBC WITH AUTO DIFFERENTIAL   SURGICAL PATHOLOGY   SURGICAL PATHOLOGY       As interpreted by me, the above displayed labs are abnormal. All other labs obtained during this visit were within normal range or not returned as of this dictation. RADIOLOGY:   Non-plain film images such as CT, Ultrasound and MRI are read by the radiologist. Plain radiographic images are visualized and preliminarily interpreted by the ED Provider with the below findings:    Interpretation per the Radiologist below, if available at the time of this note:    CT ABDOMEN PELVIS W IV CONTRAST Additional Contrast? None   Final Result   Dilated thickened fluid-filled appendix with adjacent inflammatory stranding   and thickening of the adjacent terminal ileum. Findings consistent with   acute appendicitis. No results found.     No results found.    PROCEDURES   Unless otherwise noted below, none    PAST MEDICAL HISTORY/Chronic Conditions Affecting Care      has a past medical history of CAD (coronary artery disease), GERD (gastroesophageal reflux disease), Hypertension, Insomnia, Spondylosis, Stress, and Ventricular ectopic beats (4/12/2013). EMERGENCY DEPARTMENT COURSE    Vitals:    Vitals:    02/10/23 0015 02/10/23 0347 02/10/23 0730 02/10/23 0930   BP:  120/64 100/61 127/67   Pulse: 54 53 50 50   Resp:  16 16 16   Temp:  98.1 °F (36.7 °C) 98.2 °F (36.8 °C) 98.1 °F (36.7 °C)   TempSrc:  Oral Oral Oral   SpO2:  94% 95%    Weight:       Height:           Patient was given the following medications:  Medications   ondansetron (ZOFRAN) injection 4 mg (has no administration in time range)   midazolam PF (VERSED) injection 2 mg (has no administration in time range)   ipratropium-albuterol (DUONEB) nebulizer solution 1 ampule (has no administration in time range)   0.9 % sodium chloride bolus (0 mLs IntraVENous Stopped 2/9/23 0013)   ondansetron (ZOFRAN) injection 4 mg (4 mg IntraVENous Given 2/8/23 2120)   iopamidol (ISOVUE-370) 76 % injection 75 mL (75 mLs IntraVENous Given 2/8/23 2257)   cefTRIAXone (ROCEPHIN) 2,000 mg in sterile water 20 mL IV syringe (2,000 mg IntraVENous Given 2/9/23 0016)   0.9 % sodium chloride bolus (0 mLs IntraVENous Stopped 2/9/23 0130)   bisacodyl (DULCOLAX) EC tablet 5 mg (5 mg Oral Given 2/10/23 9309)       Medical Decision Making/Differential Diagnosis:  CC/HPI Summary, Social Determinants of health, Records Reviewed, DDx, testing done/not done, ED Course, Reassessment, disposition considerations/shared decision making with patient, consults, disposition:        CC/HPI Summary, DDx, ED Course, Reassessment, Tests Considered, Patient expectation:   75-year-old gentleman with past medical history of hypertension, acid reflux, CAD presenting today with concern for right lower quadrant abdominal pain.   The complaint began last evening, nothing is made it better or worse, has been coming and going throughout the day, associated with nausea. He is afebrile on arrival to emergency department. Hemodynamically stable. She is symptomatically with IV fluid bolus, IV Zofran, IV morphine. Differential diagnosis to include but not limited to acute appendicitis, acute diverticulitis, obstruction, viral illness, nephrolithiasis. Lab work benign with no leukocytosis or anemia, lactic acid is within normal limits, no electrolyte O'Sharon's, no kidney or liver dysfunction lipase was within normal limits. Urinalysis without evidence of blood or other infection. CT abdomen pelvis demonstrating acute appendicitis. Patient given second fluid bolus, IV Rocephin, IV Flagyl. Consult placed to general surgery and they accepted the patient for admission. ED Course as of 02/11/23 1043   Wed Feb 08, 2023   2346 Discussed with patient and his pain is controlled right now. Awaiting CT result. [MM]   Thu Feb 09, 2023   0028 Discussed with Dr. Sowmya Dasilva who is excepted patient for admission. [CB]      ED Course User Index  [CB] Caroline Olszewski, DO  [MM] Marcela Pugh DO        CONSULTS: (Who and What was discussed)  IP CONSULT TO GENERAL SURGERY    FINAL IMPRESSION      1. Acute appendicitis with generalized peritonitis, without gangrene or abscess, unspecified whether perforation present    2. Acute appendicitis, unspecified acute appendicitis type          DISPOSITION/PLAN     DISPOSITION Admitted 02/09/2023 12:28:44 AM       (Please note that portions of this note were completed with a voice recognition program.  Efforts were made to edit the dictations but occasionally words are mis-transcribed. )    Marcela Pugh DO (electronically signed)            Marcela Pugh DO  Resident  02/11/23 9647

## 2023-02-09 NOTE — ANESTHESIA PRE PROCEDURE
Department of Anesthesiology  Preprocedure Note       Name:  Dariusz Beebe   Age:  67 y.o.  :  1950                                          MRN:  [id number]         Date:  2023      Surgeon: Luke Chambers    Procedure: APPENDECTOMY LAPAROSCOPIC (Abdomen)    Medications prior to admission:   Prior to Admission medications    Medication Sig Start Date End Date Taking? Authorizing Provider   finasteride (PROSCAR) 5 MG tablet  23   Historical Provider, MD   hydroCHLOROthiazide (HYDRODIURIL) 12.5 MG tablet Take 1 tablet by mouth daily 23   Corey Green DO   hydroCHLOROthiazide (HYDRODIURIL) 12.5 MG tablet Take 1 tablet by mouth daily 2/2/23 3/4/23  Corey Green DO   tamsulosin (FLOMAX) 0.4 MG capsule TAKE 1 CAPSULE TWICE DAILY 23   Corey Green DO   sertraline (ZOLOFT) 25 MG tablet TAKE 1 TABLET NIGHTLY (DOSECHANGE-DEACTIVATED 50MG) 23   Corey Green DO   olmesartan (BENICAR) 40 MG tablet TAKE 1 TABLET DAILY 23   Alyssa White DO   NONFORMULARY Alpha lopoic acid    Historical Provider, MD   dutasteride (AVODART) 0.5 MG capsule TAKE ONE CAPSULE BY MOUTH ONCE A WEEK  Patient not taking: No sig reported 22   Historical Provider, MD   vitamin D3 (CHOLECALCIFEROL) 125 MCG (5000 UT) TABS tablet Take 0.5 tablets by mouth daily 22   Corey Green DO   rosuvastatin (CRESTOR) 40 MG tablet Take 1 tablet by mouth daily 22   Corey Green DO   pantoprazole (PROTONIX) 40 MG tablet Take 1 tablet by mouth daily as needed (heartburn) 3/1/22   Corey Green DO   Coenzyme Q10 (COQ10) 100 MG CAPS Take 100 mg by mouth daily    Historical Provider, MD   aspirin 81 MG EC tablet Take 81 mg by mouth daily    Historical Provider, MD   clonazePAM (KLONOPIN) 0.5 MG tablet Take 1 tablet by mouth nightly as needed for Anxiety for up to 180 days.  20  Corey Green DO   amoxicillin (AMOXIL) 250 MG capsule Take 250 mg by mouth daily as needed Takes pre-op for dental procedures, h/o bilat hip arthoplasty    Historical Provider, MD   sucralfate (CARAFATE) 1 GM tablet Take 1 g by mouth 4 times daily Takes as needed    Historical Provider, MD   magnesium oxide (MAG-OX) 400 MG tablet Take 400 mg by mouth 2 times daily. Historical Provider, MD       Current medications:    No current facility-administered medications for this visit. No current outpatient medications on file. Facility-Administered Medications Ordered in Other Visits   Medication Dose Route Frequency Provider Last Rate Last Admin    HYDROmorphone (DILAUDID) injection 0.5 mg  0.5 mg IntraVENous Q4H PRN Gertrudis Lanza MD        morphine sulfate (PF) injection 4 mg  4 mg IntraVENous Once John Saavedra DO        metronidazole (FLAGYL) 500 mg in 0.9% NaCl 100 mL IVPB premix  500 mg IntraVENous Q8H John Saavedra DO   Stopped at 02/09/23 0132       Allergies:  No Known Allergies    Problem List:    Patient Active Problem List   Diagnosis Code    Abnormal cardiovascular stress test R94.39    Mitral regurgitation I34.0    Ventricular ectopy I49.3    Arthritis, degenerative M19.90    Ventricular ectopic beats I49.3    Dyslipidemia E78.5    Essential hypertension I10    Vitamin D deficiency E55.9    Gastroesophageal reflux disease K21.9    Hyperuricemia E79.0    Anxiety and depression F41.9, F32. A    Acute appendicitis K35.80       Past Medical History:        Diagnosis Date    CAD (coronary artery disease)     with stents - Dr. Tineo Carrier yearly     GERD (gastroesophageal reflux disease)     for EGD 5-22-20     Hypertension     Insomnia     Spondylosis     L4&5    Stress     mild- work related     Ventricular ectopic beats 4/12/2013       Past Surgical History:        Procedure Laterality Date    COLONOSCOPY      CORONARY ANGIOPLASTY      with stent 2011     DIAGNOSTIC CARDIAC CATH LAB PROCEDURE      ENDOSCOPY, COLON, DIAGNOSTIC      JOINT REPLACEMENT Right 1990, 11/2011    hip x2    JOINT REPLACEMENT Left 1990, 2007    hip x2     TONSILLECTOMY      UPPER GASTROINTESTINAL ENDOSCOPY N/A 5/22/2020    EGD BIOPSY performed by Edmond Figueredo MD at [facility] ENDOSCOPY       Social History:    Social History     Tobacco Use    Smoking status: Never    Smokeless tobacco: Never   Substance Use Topics    Alcohol use: No     Comment: rarely                                Counseling given: Not Answered      Vital Signs (Current): There were no vitals filed for this visit. BP Readings from Last 3 Encounters:   02/09/23 (!) 154/88   02/08/23 130/62   02/02/23 (!) 152/80       NPO Status:                                                                                 BMI:   Wt Readings from Last 3 Encounters:   02/08/23 205 lb (93 kg)   02/08/23 212 lb (96.2 kg)   02/02/23 212 lb (96.2 kg)     There is no height or weight on file to calculate BMI.    CBC:   Lab Results   Component Value Date/Time    WBC 11.1 02/08/2023 06:17 PM    RBC 4.83 02/08/2023 06:17 PM    RBC 4.84 10/11/2022 09:16 AM    HGB 13.7 02/08/2023 06:17 PM    HCT 42.4 02/08/2023 06:17 PM    MCV 87.8 02/08/2023 06:17 PM    RDW 13.2 02/08/2023 06:17 PM     02/08/2023 06:17 PM       CMP:   Lab Results   Component Value Date/Time     02/08/2023 06:17 PM    K 4.2 02/08/2023 06:17 PM     02/08/2023 06:17 PM    CO2 27 02/08/2023 06:17 PM    BUN 21 02/08/2023 06:17 PM    CREATININE 1.1 02/08/2023 06:17 PM    GFRAA >60 12/16/2021 08:48 AM    LABGLOM >60 02/08/2023 06:17 PM    GLUCOSE 98 02/08/2023 06:17 PM    GLUCOSE 94 10/11/2022 09:16 AM    PROT 6.6 02/08/2023 06:17 PM    CALCIUM 9.0 02/08/2023 06:17 PM    BILITOT 0.9 02/08/2023 06:17 PM    ALKPHOS 64 02/08/2023 06:17 PM    AST 16 02/08/2023 06:17 PM    ALT 11 02/08/2023 06:17 PM       POC Tests: No results for input(s): POCGLU, POCNA, POCK, POCCL, POCBUN, POCHEMO, POCHCT in the last 72 hours.     Coags: No results found for: PROTIME, INR, APTT    HCG (If Applicable): No results found for: PREGTESTUR, PREGSERUM, HCG, HCGQUANT     ABGs: No results found for: PHART, PO2ART, BUU0CSO, MHJ1NVM, BEART, I2HMOSVX     Type & Screen (If Applicable):  No results found for: LABABO, LABRH    Drug/Infectious Status (If Applicable):  No results found for: HIV, HEPCAB    COVID-19 Screening (If Applicable):   Lab Results   Component Value Date/Time    COVID19 Not Detected 05/18/2020 12:00 PM         Anesthesia Evaluation  Patient summary reviewed and Nursing notes reviewed   history of anesthetic complications: PONV. Airway: Mallampati: II  TM distance: >3 FB   Neck ROM: full  Mouth opening: > = 3 FB   Dental:    (+) caps      Pulmonary:Negative Pulmonary ROS breath sounds clear to auscultation                             Cardiovascular:  Exercise tolerance: good (>4 METS),   (+) hypertension:, CAD:, CABG/stent:, hyperlipidemia        Rhythm: regular  Rate: normal           Beta Blocker:  Not on Beta Blocker         Neuro/Psych:   (+) depression/anxiety              ROS comment: Chronic neck pain GI/Hepatic/Renal:   (+) GERD: poorly controlled,          ROS comment: Acute Appendicitis    BPH. Endo/Other:    (+) : arthritis:., .                 Abdominal:             Vascular: negative vascular ROS. Other Findings:             Anesthesia Plan      general     ASA 3 - emergent       Induction: intravenous. MIPS: Postoperative opioids intended and Prophylactic antiemetics administered. Anesthetic plan and risks discussed with patient. Plan discussed with CRNA.                     Shan Bowen MD   2/9/2023

## 2023-02-09 NOTE — DISCHARGE INSTRUCTIONS
Discharge Instructions for an Appendectomy     Wound care  Keep the incision clean and dry. In laparoscopic surgery, there are three small incisions with glue do not scrub this off it will come off on its own    You may shower 24 hours after surgery and may take a bath in 5 days     Diet    After an appendectomy, you will start with a clear liquid diet, such as Jell-O and broth. Clear liquids are anything that is transparent if you hold them up to the light. If you tolerate clear liquids, you can slowly go back to your regular diet. Drink plenty of fluids to prevent constipation . Physical Activity    Avoid heavy lifting or strenuous activities. Do not carry anything more than 10lbs for 2 weeks  Rest when tired, but it is good to take short walks a few times a day. Medications    Take your medication as directed. Do not change the amount or the schedule. Take Ibuprofen and Tylenol for moderate pain. Use the perscribed medication only for more severe pain. Prescriptions will be sent with you. Use as directed. When taking pain medications, you may experience dizziness or drowsiness. Do not drink alcohol or drive when taking these medications    Do not share your prescriptions. Call if any of the Following Occurs   Monitor your recovery once you leave the hospital. If you have any of the following problems call the office:  Signs of infection, including fever and chills   Worsening redness, swelling, increasing pain, excessive bleeding, or discharge at the incision site   Cough, shortness of breath, chest pain, or severe nausea or vomiting   Increased abdominal pain   Fainting or dizziness   Passing blood in the stool     In case of an emergency, call 911 immediately. Drain Care  What is the drain for? Your drain is to stop fluid from building up under the skin.   It also helps your incision to heal.  Your doctor will take the drain out when there is less and less fluid coming out.    What supplies do I need? A cup for the fluid to be emptied into and to measure the fluid. The chart to record the amount of fluid (if your surgeon tells you to do so). 4 x 4 gauze  Tape   Safety pin  Antibiotic ointment (available over the counter)    Drain care:  Wash you hands before you change the dressing or empty the drain. This is important to prevent infection. Change the dressing to the drain tube site daily. Apply a small amount of antibiotic ointment to the skin at the drain tube site with each dressing change. How do I empty the drain? Hold the plastic bulb in an upright position with one hand and take the cap off with the other hand. With the bulb in one hand and the cup in the other, turn the bulb over and place the end into the cup. Squeeze gently and empty the fluid into the cup. Squeeze the bulb tightly with one hand (to flatten it as much as possible) and recap it with the other hand. This starts the suction again inside of the bulb. Do not squeeze the bulb if the cap is on. Record the amount of drainage if your physician has asked you to do so. Discard the drainage into the toilet. Rinse the cup so it is clean and ready to be used again the next time. Wash your hands. Re-pin the tape tab of the drain tube back to your clothing. The bulb should always be lower than your incision. This will prevent drainage from flowing back into the tubing and the incision. How often do I need to empty my drain? Usually you empty the drain when it is half full. Most find they need to empty the drain 3 times a day- when you wake up in the morning, mid day, and before bed. If the bulb fills up more than this, you may need to empty the drain more often. How much drainage should there be? The amount of drainage may vary from day to day. It should be less each day. If you increase your activity, it may increase the amount of drainage, temporarily.   Your doctor will remove the drain when the output is less than 50 ccs/mls in 24 hours. What color should the drainage be? The color will vary. It may go from bright red to pink, then to clear yellow. What do I do with my drainage record? Take it to your follow up visit with your surgeon. May I shower? Yes, 48 hours post-operatively. It may help to secure the drain to an old cloth/robe belt that is around your waist.     When should I call the doctor? Call your doctor if:  You have an elevated temperature (greater than 101 or higher)  The drainage increases or stops suddenly  The drain stitches come loose or break, or if the drain comes out  The area on your skin around the drain gets red, swollen, or painful  The fluid coming out of the drain changes (has pus in it, becomes bright red, or has a bad smell).

## 2023-02-10 VITALS
HEART RATE: 50 BPM | BODY MASS INDEX: 27.17 KG/M2 | WEIGHT: 205 LBS | SYSTOLIC BLOOD PRESSURE: 127 MMHG | RESPIRATION RATE: 16 BRPM | HEIGHT: 73 IN | OXYGEN SATURATION: 95 % | DIASTOLIC BLOOD PRESSURE: 67 MMHG | TEMPERATURE: 98.1 F

## 2023-02-10 LAB
ANION GAP SERPL CALCULATED.3IONS-SCNC: 11 MMOL/L (ref 7–16)
BASOPHILS ABSOLUTE: 0.01 E9/L (ref 0–0.2)
BASOPHILS RELATIVE PERCENT: 0.1 % (ref 0–2)
BUN BLDV-MCNC: 25 MG/DL (ref 6–23)
CALCIUM SERPL-MCNC: 8.4 MG/DL (ref 8.6–10.2)
CHLORIDE BLD-SCNC: 101 MMOL/L (ref 98–107)
CO2: 25 MMOL/L (ref 22–29)
CREAT SERPL-MCNC: 1.2 MG/DL (ref 0.7–1.2)
EOSINOPHILS ABSOLUTE: 0.02 E9/L (ref 0.05–0.5)
EOSINOPHILS RELATIVE PERCENT: 0.2 % (ref 0–6)
GFR SERPL CREATININE-BSD FRML MDRD: >60 ML/MIN/1.73
GLUCOSE BLD-MCNC: 143 MG/DL (ref 74–99)
GRAM STAIN ORDERABLE: NORMAL
HCT VFR BLD CALC: 38.4 % (ref 37–54)
HEMOGLOBIN: 12.6 G/DL (ref 12.5–16.5)
IMMATURE GRANULOCYTES #: 0.07 E9/L
IMMATURE GRANULOCYTES %: 0.6 % (ref 0–5)
LYMPHOCYTES ABSOLUTE: 0.74 E9/L (ref 1.5–4)
LYMPHOCYTES RELATIVE PERCENT: 6 % (ref 20–42)
MCH RBC QN AUTO: 29.5 PG (ref 26–35)
MCHC RBC AUTO-ENTMCNC: 32.8 % (ref 32–34.5)
MCV RBC AUTO: 89.9 FL (ref 80–99.9)
MONOCYTES ABSOLUTE: 0.84 E9/L (ref 0.1–0.95)
MONOCYTES RELATIVE PERCENT: 6.8 % (ref 2–12)
MRSA CULTURE ONLY: NORMAL
NEUTROPHILS ABSOLUTE: 10.74 E9/L (ref 1.8–7.3)
NEUTROPHILS RELATIVE PERCENT: 86.3 % (ref 43–80)
PDW BLD-RTO: 13.2 FL (ref 11.5–15)
PLATELET # BLD: 148 E9/L (ref 130–450)
PMV BLD AUTO: 11.1 FL (ref 7–12)
POTASSIUM REFLEX MAGNESIUM: 3.8 MMOL/L (ref 3.5–5)
RBC # BLD: 4.27 E12/L (ref 3.8–5.8)
SODIUM BLD-SCNC: 137 MMOL/L (ref 132–146)
WBC # BLD: 12.4 E9/L (ref 4.5–11.5)

## 2023-02-10 PROCEDURE — 6370000000 HC RX 637 (ALT 250 FOR IP): Performed by: STUDENT IN AN ORGANIZED HEALTH CARE EDUCATION/TRAINING PROGRAM

## 2023-02-10 PROCEDURE — 2580000003 HC RX 258: Performed by: STUDENT IN AN ORGANIZED HEALTH CARE EDUCATION/TRAINING PROGRAM

## 2023-02-10 PROCEDURE — 2500000003 HC RX 250 WO HCPCS: Performed by: EMERGENCY MEDICINE

## 2023-02-10 PROCEDURE — 6360000002 HC RX W HCPCS: Performed by: STUDENT IN AN ORGANIZED HEALTH CARE EDUCATION/TRAINING PROGRAM

## 2023-02-10 PROCEDURE — 80048 BASIC METABOLIC PNL TOTAL CA: CPT

## 2023-02-10 PROCEDURE — 85025 COMPLETE CBC W/AUTO DIFF WBC: CPT

## 2023-02-10 PROCEDURE — 36415 COLL VENOUS BLD VENIPUNCTURE: CPT

## 2023-02-10 RX ORDER — AMOXICILLIN AND CLAVULANATE POTASSIUM 875; 125 MG/1; MG/1
1 TABLET, FILM COATED ORAL 2 TIMES DAILY
Qty: 7 TABLET | Refills: 0 | Status: SHIPPED | OUTPATIENT
Start: 2023-02-10 | End: 2023-02-14

## 2023-02-10 RX ADMIN — BISACODYL 5 MG: 5 TABLET, COATED ORAL at 09:38

## 2023-02-10 RX ADMIN — OXYCODONE HYDROCHLORIDE 5 MG: 5 TABLET ORAL at 00:30

## 2023-02-10 RX ADMIN — WATER 2000 MG: 1 INJECTION INTRAMUSCULAR; INTRAVENOUS; SUBCUTANEOUS at 00:12

## 2023-02-10 RX ADMIN — LOSARTAN POTASSIUM 100 MG: 50 TABLET, FILM COATED ORAL at 10:04

## 2023-02-10 RX ADMIN — METRONIDAZOLE 500 MG: 500 INJECTION, SOLUTION INTRAVENOUS at 00:11

## 2023-02-10 RX ADMIN — ENOXAPARIN SODIUM 40 MG: 40 INJECTION SUBCUTANEOUS at 09:38

## 2023-02-10 RX ADMIN — HYDROCHLOROTHIAZIDE 12.5 MG: 12.5 TABLET ORAL at 10:04

## 2023-02-10 RX ADMIN — TAMSULOSIN HYDROCHLORIDE 0.4 MG: 0.4 CAPSULE ORAL at 09:38

## 2023-02-10 RX ADMIN — METRONIDAZOLE 500 MG: 500 INJECTION, SOLUTION INTRAVENOUS at 09:39

## 2023-02-10 ASSESSMENT — PAIN DESCRIPTION - ORIENTATION: ORIENTATION: RIGHT;LOWER

## 2023-02-10 ASSESSMENT — PAIN SCALES - GENERAL
PAINLEVEL_OUTOF10: 3
PAINLEVEL_OUTOF10: 4

## 2023-02-10 ASSESSMENT — PAIN DESCRIPTION - LOCATION: LOCATION: ABDOMEN;INCISION

## 2023-02-10 ASSESSMENT — PAIN DESCRIPTION - PAIN TYPE: TYPE: ACUTE PAIN;SURGICAL PAIN

## 2023-02-10 ASSESSMENT — PAIN - FUNCTIONAL ASSESSMENT: PAIN_FUNCTIONAL_ASSESSMENT: ACTIVITIES ARE NOT PREVENTED

## 2023-02-10 ASSESSMENT — PAIN DESCRIPTION - DESCRIPTORS: DESCRIPTORS: DISCOMFORT;SORE

## 2023-02-10 ASSESSMENT — PAIN DESCRIPTION - FREQUENCY: FREQUENCY: INTERMITTENT

## 2023-02-10 ASSESSMENT — PAIN DESCRIPTION - ONSET: ONSET: GRADUAL

## 2023-02-10 NOTE — PROGRESS NOTES
CLINICAL PHARMACY NOTE: MEDS TO BEDS    Total # of Prescriptions Filled: 1   The following medications were delivered to the patient:  Amoxicillin pot clavul 875-125 mg    Additional Documentation:

## 2023-02-10 NOTE — PROGRESS NOTES
P Quality Flow/Interdisciplinary Rounds Progress Note        Quality Flow Rounds held on February 10, 2023    Disciplines Attending:  Bedside Nurse, , , and Nursing Unit Leadership    Nawaf Hamilton was admitted on 2/8/2023  8:47 PM    Anticipated Discharge Date:       Disposition:    Edward Score:  Edward Scale Score: 21    Readmission Risk              Risk of Unplanned Readmission:  13           Discussed patient goal for the day, patient clinical progression, and barriers to discharge.   The following Goal(s) of the Day/Commitment(s) have been identified:   Discharge planning      Missael Gunn RN  February 10, 2023

## 2023-02-10 NOTE — PROGRESS NOTES
Pt up and ambulating in room throughout the night and tolerating it well.  Pt is voiding and eating with no complaints at this time

## 2023-02-10 NOTE — ANESTHESIA POSTPROCEDURE EVALUATION
Department of Anesthesiology  Postprocedure Note    Patient: Harley No  MRN: [id number]  YOB: 1950  Date of evaluation: 2/10/2023      Procedure Summary     Date: 02/09/23 Room / Location: [facility] [address] [place] [address]    Anesthesia Start: 5737 Anesthesia Stop: 0750    Procedures:       APPENDECTOMY LAPAROSCOPIC (Abdomen)      Procedure Not Yet Scheduled Diagnosis:       Acute appendicitis, unspecified acute appendicitis type      (Acute appendicitis, unspecified acute appendicitis type [K35.80])    Surgeons: Selena Headley MD Responsible Provider: Madison Santos MD    Anesthesia Type: General ASA Status: 3 - Emergent          Anesthesia Type: General    Sudha Phase I: Sudha Score: 9    Sudha Phase II:        Anesthesia Post Evaluation    Patient location during evaluation: PACU  Patient participation: complete - patient participated  Level of consciousness: awake  Airway patency: patent  Nausea & Vomiting: no nausea and no vomiting  Complications: no  Cardiovascular status: hemodynamically stable  Respiratory status: acceptable  Hydration status: stable

## 2023-02-11 LAB
ANAEROBIC CULTURE: NORMAL
CULTURE SURGICAL: ABNORMAL
ORGANISM: ABNORMAL

## 2023-02-12 LAB
AFB SMEAR: NORMAL
FUNGUS STAIN: NORMAL

## 2023-02-13 ENCOUNTER — OFFICE VISIT (OUTPATIENT)
Dept: SURGERY | Age: 73
End: 2023-02-13

## 2023-02-13 VITALS
HEART RATE: 52 BPM | HEIGHT: 73 IN | BODY MASS INDEX: 28.15 KG/M2 | TEMPERATURE: 98.2 F | WEIGHT: 212.4 LBS | OXYGEN SATURATION: 98 % | DIASTOLIC BLOOD PRESSURE: 82 MMHG | SYSTOLIC BLOOD PRESSURE: 141 MMHG

## 2023-02-13 DIAGNOSIS — Z09 POSTOP CHECK: Primary | ICD-10-CM

## 2023-02-13 PROCEDURE — 99024 POSTOP FOLLOW-UP VISIT: CPT | Performed by: SURGERY

## 2023-02-13 NOTE — PROGRESS NOTES
Progress Note - Post-op follow up     Patient's Name/Date of Birth: Carina Ann / 1950    Date: 2/13/2023    PCP: Robson Clark DO    Referring Physician:   No ref. provider found  N/A    Chief Complaint   Patient presents with    Post-Op Check     Appendectomy, 2/8/23  needs drain pulled       Subjective:  Patient presents to the office following appendectomy. The patient is tolerating a regular diet. Denies n/v/d/c. Pain controlled with pain medication. Patient's medications, allergies, past medical, surgical, social and family histories were reviewed and updated as appropriate. Physical Exam:  BP (!) 141/82   Pulse 52   Temp 98.2 °F (36.8 °C) (Temporal)   Ht 6' 1\" (1.854 m)   Wt 212 lb 6.4 oz (96.3 kg)   SpO2 98%   BMI 28.02 kg/m²   General Appearance: NAD  Abdomen: soft, non-distended mild incisional tenderness, no rebound or guarding, incision C/D/I    Data Reviewed: Pathology not back yet    Assessment/Plan:    67y.o. year old male s/p lap appy    Drain removed  Symptoms of abscess discussed with the patient  Patient recovering well from surgery  Wound care discussed  Follow up HARLANN     Jeff Lobato MD 2/13/2023 4:01 PM     Send copy of H&P to PCP, Robson Clark DO and referring physician, No ref.  provider found

## 2023-02-13 NOTE — LETTER
[facility] General Surgery  [address] [facility], [address] [facility]  [address] [address]  Phone: [phone]  Fax: Matthew Kruse MD    February 13, 2023     Dolores Farris, [address] [place] [address] [address] [facility] [address] [place] [address]    Patient: Clyde Talamantes   MR Number: [id number]   YOB: 1950   Date of Visit: 2/13/2023       Dear Dolores Farris: Thank you for referring Micky Hall to me for evaluation/treatment. Below are the relevant portions of my assessment and plan of care. Progress Note - Post-op follow up     Patient's Name/Date of Birth: Clyde Talamantes / 1950    Date: 2/13/2023    PCP: Dolores Farris DO    Referring Physician:   No ref. provider found  N/A    Chief Complaint   Patient presents with    Post-Op Check     Appendectomy, 2/8/23  needs drain pulled       Subjective:  Patient presents to the office following appendectomy. The patient is tolerating a regular diet. Denies n/v/d/c. Pain controlled with pain medication. Patient's medications, allergies, past medical, surgical, social and family histories were reviewed and updated as appropriate. Physical Exam:  BP (!) 141/82   Pulse 52   Temp 98.2 °F (36.8 °C) (Temporal)   Ht 6' 1\" (1.854 m)   Wt 212 lb 6.4 oz (96.3 kg)   SpO2 98%   BMI 28.02 kg/m²   General Appearance: NAD  Abdomen: soft, non-distended mild incisional tenderness, no rebound or guarding, incision C/D/I    Data Reviewed: Pathology not back yet    Assessment/Plan:    67y.o. year old male s/p lap appy    Drain removed  Symptoms of abscess discussed with the patient  Patient recovering well from surgery  Wound care discussed  Follow up MARISOL Calderon MD 2/13/2023 4:01 PM     Send copy of H&P to PCP, Dolores Farris DO and referring physician, No ref. provider found          If you have questions, please do not hesitate to call me.  I look forward to following Tenzin Abraham along with you.    Sincerely,      Rony Hooks MD

## 2023-03-08 ENCOUNTER — TELEPHONE (OUTPATIENT)
Dept: ADMINISTRATIVE | Age: 73
End: 2023-03-08

## 2023-03-09 ENCOUNTER — OFFICE VISIT (OUTPATIENT)
Dept: SURGERY | Age: 73
End: 2023-03-09
Payer: COMMERCIAL

## 2023-03-09 ENCOUNTER — TELEPHONE (OUTPATIENT)
Dept: SURGERY | Age: 73
End: 2023-03-09

## 2023-03-09 VITALS
RESPIRATION RATE: 18 BRPM | BODY MASS INDEX: 27.7 KG/M2 | SYSTOLIC BLOOD PRESSURE: 161 MMHG | WEIGHT: 209 LBS | DIASTOLIC BLOOD PRESSURE: 78 MMHG | HEART RATE: 46 BPM | TEMPERATURE: 98.6 F | HEIGHT: 73 IN | OXYGEN SATURATION: 96 %

## 2023-03-09 DIAGNOSIS — C18.1 ADENOCARCINOMA OF APPENDIX (HCC): Primary | ICD-10-CM

## 2023-03-09 DIAGNOSIS — D37.3 LOW GRADE MUCINOUS NEOPLASM OF APPENDIX: ICD-10-CM

## 2023-03-09 PROBLEM — Z80.0 FAMILY HISTORY OF MALIGNANT NEOPLASM OF GASTROINTESTINAL TRACT: Status: ACTIVE | Noted: 2023-03-09

## 2023-03-09 PROBLEM — Z12.11 SPECIAL SCREENING FOR MALIGNANT NEOPLASMS, COLON: Status: ACTIVE | Noted: 2023-03-09

## 2023-03-09 PROCEDURE — 3078F DIAST BP <80 MM HG: CPT | Performed by: SURGERY

## 2023-03-09 PROCEDURE — 3074F SYST BP LT 130 MM HG: CPT | Performed by: SURGERY

## 2023-03-09 PROCEDURE — 1123F ACP DISCUSS/DSCN MKR DOCD: CPT | Performed by: SURGERY

## 2023-03-09 PROCEDURE — 99215 OFFICE O/P EST HI 40 MIN: CPT | Performed by: SURGERY

## 2023-03-09 NOTE — TELEPHONE ENCOUNTER
Prior Authorization Form:      DEMOGRAPHICS:                     Patient Name:  Julián Hall  Patient :  1950            Insurance:  Payor: [place] / Plan: MEDICAL [place] ADVANTAGE [facility] HMO / Product Type: *No Product type* /   Insurance ID Number:    Payer/Plan Subscr  Sex Relation Sub. Ins. ID Effective Group Num   1. [address] [facility] 1950 Male Self [id number]                                     [address], [facility] [address]   2.  Sandra Dunlap 1950 Male Self [id number]                                    [address], [facility] [address]         DIAGNOSIS & PROCEDURE:                       Procedure/Operation: COLONOSCOPY           CPT Code: 72946    Diagnosis:  SCREENING   FM H/O COLON CA    ICD10 Code: Z12.11   Z80.0    Location:  [facility]    Surgeon:  Mazie Severance INFORMATION:                          Date: 3-    Time: 8:00              Anesthesia:  MAC/TIVA                                                       Status:  Outpatient        Special Comments:         Electronically signed by Gisela Blanc MA on 3/9/2023 at 2:13 PM

## 2023-03-09 NOTE — PROGRESS NOTES
Progress Note - Follow up    Patient's Name/Date of Birth: Sandra Garcia / 1950    Date: 3/9/2023    PCP: Vivian Miles DO    Referring Physician:   Naveen Etienne, [address] [facility]    Chief Complaint   Patient presents with    Post-Op Check     Follow up for appendectomy        HPI:    His last colonoscopy was last year. The patient said he has been feeling well. No n/v/d/c. Patient's medications, allergies, past medical, surgical, social and family histories were reviewed and updated as appropriate. Review of Systems  Constitutional: negative  Respiratory: negative  Cardiovascular: negative  Gastrointestinal: as in hpi  Genitourinary:negative  Integument/breast: negative    Physical Exam:  BP (!) 161/78   Pulse (!) 46   Temp 98.6 °F (37 °C) (Infrared)   Resp 18   Ht 6' 1\" (1.854 m)   Wt 209 lb (94.8 kg)   SpO2 96%   BMI 27.57 kg/m²   General appearance: alert, cooperative and in no acute distress. Lungs: normal work of breathing  Heart: regular rate  Abdomen:  soft, nontender, nondistended  Musculoskeletal: symmetrical without edema. 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 Associated marked acute appendicitis with periappendicitis and serositis,   consistent with perforation. Cancer Case Summary:   (Appendix; CAP version [de-identified])   Procedure: Appendectomy   Tumor site: Distal half of appendix   Histologic type:  Adenocarcinoma and low-grade appendiceal mucinous   neoplasm   Histologic grade: G2-moderately differentiated   Tumor size: Cannot be accurately determined due to inflammation and   perforation   Tumor deposits: Not identified   Tumor extent: Tumor invades muscularis propria   Lymphatic and/or vascular invasion: Not identified   Margins:    Margin status for invasive carcinoma: All margins negative for invasive   carcinoma. Margin status for noninvasive tumor: All margins negative for   noninvasive tumor (LAMN)   Regional lymph nodes: Not applicable (no regional lymph nodes submitted   or found)   Distant metastasis: Not applicable   pTNM Classification (AJCC ninth edition):    pT Category:  pT2    pN Category:  pN not assigned (no nodes submitted or found)   Additional findings: Acute appendicitis with perforation (nontumor)     Comment:     The entire specimen, including appendix, has been submitted   for microscopic examination. Sections demonstrate a low-grade   appendiceal mucinous neoplasm (LAMN) involving the distal appendix. Focal invasive adenocarcinoma is identified with the LAMN lesion, with   the invasive component focally extending into the muscularis propria. The tumor is devoid of mucin pools. There is no involvement of   periappendiceal soft tissue by neoplasm, including within the region of   appendicitis and perforation. The proximal appendix excision margin and   mesenteric soft tissue margin appear free of neoplasm. Intradepartmental   consultation is obtained. 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 Will need a colonoscopy and then right hemicolectomy. Will also refer to oncology given he presented with perforated appendicitis. I will set the patient up for a colonoscopy, possible biopsy, possible polypectomy. I explained the risks including but not limited to bowel perforation, postoperative bleeding, post-polypectomy syndrome, as well as the possibility of needing emergency surgery or another colonoscopy.  The benefits, alternatives, and potential complications associated with the above procedure to be performed and transfusions when applicable with the patient/responsible person prior to the procedure. All of the patient's questions were answered. The patient understands and agrees to the procedure. Discussed the surgery with the patient at length. I told the patient that we will start the surgery laparoscopically with robotic assistance but we might have to convert to an open procedure depending on what we find. I told the patient an open procedure would mean a longer hospital stay and recovery period with more pain. I also discussed the possibility of needing a colostomy or ileostomy which may or may not be reversible. I discussed the risk of anastomotic leak and the need for reoperation if this occurs. We talked about other complications including but no limited to ureter or bladder injury, injury to other surrounding abdominal organs, bleeding, infection. The patient understands and agrees to surgery. I will proceed with a robotic assisted laparoscopic possible open right hemicolectomy, possible ostomy.        Electronically by Brannon Levy MD, General Surgery  on 3/9/2023 at 12:41 PM      Send copy of H&P to PCP, Shane Ochoa DO and referring physician, Dariel Bell DO      3/9/2023

## 2023-03-13 RX ORDER — B-COMPLEX WITH VITAMIN C
TABLET ORAL
COMMUNITY

## 2023-03-13 NOTE — PROGRESS NOTES
[facility] PRE-ADMISSION TESTING INSTRUCTIONS    The Preadmission Testing patient is instructed accordingly using the following criteria (check applicable):    ARRIVAL INSTRUCTIONS:  [x] Parking the day of Surgery is located in the Main Entrance lot. Upon entering the door, make an immediate right to the surgery reception desk    [x] Bring photo ID and insurance card    [x] Bring in a copy of Living will or Durable Power of  papers. [x] Please be sure to arrange transportation to and from the hospital    [x] Please arrange for someone to be with you the remainder of the day due to having anesthesia      GENERAL INSTRUCTIONS:    [x] Nothing by mouth after midnight, including gum, candy, mints or water    [x] You may brush your teeth, but do not swallow any water    [x] Take medications as instructed with 1-2 oz of water    [x] Stop herbal supplements and vitamins 5 days prior to procedure    [x] Follow preop dosing of blood thinners per physician instructions    [] Do not take insulin or oral diabetic medications    [] If diabetic and have low blood sugar or feel symptomatic, take 1-2oz apple juice or glucose tablets    [] Bring inhalers day of surgery    [] Bring C-PAP/ Bi-Pap day of surgery    [] Bring urine specimen day of surgery    [x] Antibacterial Soap shower or bath AM of Surgery, no lotion, powders or creams to surgical site    [x] Follow bowel prep as instructed per surgeon    [x] No tobacco products within 24 hours of surgery     [x] No alcohol or illegal drug use within 24 hours of surgery.     [x] Jewelry, body piercing's, eyeglasses, contact lenses and dentures are not permitted into surgery (bring cases)      [] Please do not wear any nail polish or make up on the day of surgery    [] If not already done, you can expect a call from registration    [x] If surgeon requests a time change you will be notified the day prior to surgery    [] If you receive a survey after surgery we would greatly appreciate your comments    [] Parent/guardian of a minor must accompany their child and remain on the premises  the entire time they are under our care     [] Pediatric patients may bring favorite toy, blanket or comfort item with them    [x] A caregiver or family member must remain with the patient during their stay if they are mentally handicapped, have dementia, disoriented or unable to use a call light or would be a safety concern if left unattended    [] Please notify surgeon if you develop any illness between now and time of surgery (cold, cough, sore throat, fever, nausea, vomiting) or any signs of infections  including skin, wounds, and dental.    [] Other instructions    EDUCATIONAL MATERIALS PROVIDED:    [] PAT Preoperative Education Packet/Booklet     [] Medication List    [] Fluoroscopy Information Pamphlet    [] Transfusion bracelet applied with instructions    [] Joint replacement video reviewed    [] Shower with antibacterial soap and use CHG wipes provided the evening before surgery as instructed

## 2023-03-14 ENCOUNTER — TELEPHONE (OUTPATIENT)
Dept: SURGERY | Age: 73
End: 2023-03-14

## 2023-03-14 DIAGNOSIS — C18.1 CANCER OF APPENDIX (HCC): Primary | ICD-10-CM

## 2023-03-14 RX ORDER — SODIUM CHLORIDE 9 MG/ML
INJECTION, SOLUTION INTRAVENOUS CONTINUOUS
Status: CANCELLED | OUTPATIENT
Start: 2023-03-14

## 2023-03-14 RX ORDER — SODIUM CHLORIDE 9 MG/ML
INJECTION, SOLUTION INTRAVENOUS PRN
OUTPATIENT
Start: 2023-03-14

## 2023-03-14 RX ORDER — SODIUM CHLORIDE 0.9 % (FLUSH) 0.9 %
5-40 SYRINGE (ML) INJECTION PRN
OUTPATIENT
Start: 2023-03-14

## 2023-03-14 RX ORDER — SODIUM CHLORIDE 0.9 % (FLUSH) 0.9 %
5-40 SYRINGE (ML) INJECTION EVERY 12 HOURS SCHEDULED
OUTPATIENT
Start: 2023-03-14

## 2023-03-14 NOTE — TELEPHONE ENCOUNTER
CT scan is scheduled for 3/16/23 at [facility]. Arrive at 830. NPO 3 hrs prior. BUN/CR ordered. Pt to have done tomorrow at [facility]. Patient notified and verbalizes understanding.

## 2023-03-14 NOTE — TELEPHONE ENCOUNTER
Prior Authorization Form:      DEMOGRAPHICS:                     Patient Name:  Clarke Loco  Patient :  1950            Insurance:  Payor: [place] / Plan: [employer] [facility] [facility] / Product Type: *No Product type* /   Insurance ID Number:    Payer/Plan Subscr  Sex Relation Sub. Ins. ID Effective Group Num   1.  MEDICAL Ginny  R 1950 Male Self [id number]                                    [address]         DIAGNOSIS & PROCEDURE:                       Procedure/Operation: lap robotic right hemicolectomy           CPT Code: 05731    Diagnosis:  appendix cancer    ICD10 Code: c18.1    Location:  [facility]    Surgeon:  teresa James INFORMATION:                          Date: 3-    Time: 8:00              Anesthesia:  General and MAC/TIVA                                                       Status:  Outpatient        Special Comments:  same day admitt       Electronically signed by Nano Negron MA on 3/14/2023 at 2:12 PM

## 2023-03-14 NOTE — TELEPHONE ENCOUNTER
----- Message from Shannon Bryson MD sent at 3/14/2023 10:49 AM EDT -----  The patient needs to have a CT chest/abd pelvis with PO and IV contrast Dx: appendiceal cancer. Set this up before the surgery.

## 2023-03-15 ENCOUNTER — HOSPITAL ENCOUNTER (OUTPATIENT)
Age: 73
Setting detail: OUTPATIENT SURGERY
Discharge: HOME | End: 2023-03-15
Attending: SURGERY | Admitting: SURGERY
Payer: MEDICARE

## 2023-03-15 ENCOUNTER — HOSPITAL ENCOUNTER (OUTPATIENT)
Age: 73
Discharge: HOME | End: 2023-03-15
Attending: SURGERY
Payer: MEDICARE

## 2023-03-15 ENCOUNTER — ANESTHESIA (OUTPATIENT)
Dept: ENDOSCOPY | Age: 73
End: 2023-03-15
Payer: MEDICARE

## 2023-03-15 ENCOUNTER — ANESTHESIA EVENT (OUTPATIENT)
Dept: ENDOSCOPY | Age: 73
End: 2023-03-15
Payer: MEDICARE

## 2023-03-15 VITALS
SYSTOLIC BLOOD PRESSURE: 142 MMHG | HEART RATE: 56 BPM | WEIGHT: 202 LBS | BODY MASS INDEX: 26.77 KG/M2 | TEMPERATURE: 98.4 F | DIASTOLIC BLOOD PRESSURE: 71 MMHG | OXYGEN SATURATION: 95 % | RESPIRATION RATE: 18 BRPM | HEIGHT: 73 IN

## 2023-03-15 DIAGNOSIS — Z12.11 SPECIAL SCREENING FOR MALIGNANT NEOPLASMS, COLON: ICD-10-CM

## 2023-03-15 DIAGNOSIS — Z80.0 FAMILY HISTORY OF MALIGNANT NEOPLASM OF GASTROINTESTINAL TRACT: ICD-10-CM

## 2023-03-15 LAB
BUN SERPL-MCNC: 17 MG/DL (ref 6–23)
CREAT SERPL-MCNC: 1 MG/DL (ref 0.7–1.2)

## 2023-03-15 PROCEDURE — 36415 COLL VENOUS BLD VENIPUNCTURE: CPT

## 2023-03-15 PROCEDURE — 3700000001 HC ADD 15 MINUTES (ANESTHESIA): Performed by: SURGERY

## 2023-03-15 PROCEDURE — 2709999900 HC NON-CHARGEABLE SUPPLY: Performed by: SURGERY

## 2023-03-15 PROCEDURE — 88305 TISSUE EXAM BY PATHOLOGIST: CPT

## 2023-03-15 PROCEDURE — 2500000003 HC RX 250 WO HCPCS: Performed by: NURSE ANESTHETIST, CERTIFIED REGISTERED

## 2023-03-15 PROCEDURE — 3700000000 HC ANESTHESIA ATTENDED CARE: Performed by: SURGERY

## 2023-03-15 PROCEDURE — 6360000002 HC RX W HCPCS: Performed by: NURSE ANESTHETIST, CERTIFIED REGISTERED

## 2023-03-15 PROCEDURE — 84520 ASSAY OF UREA NITROGEN: CPT

## 2023-03-15 PROCEDURE — 45380 COLONOSCOPY AND BIOPSY: CPT | Performed by: SURGERY

## 2023-03-15 PROCEDURE — 3609010300 HC COLONOSCOPY W/BIOPSY SINGLE/MULTIPLE: Performed by: SURGERY

## 2023-03-15 PROCEDURE — 82565 ASSAY OF CREATININE: CPT

## 2023-03-15 PROCEDURE — 2580000003 HC RX 258: Performed by: SURGERY

## 2023-03-15 PROCEDURE — 7100000011 HC PHASE II RECOVERY - ADDTL 15 MIN: Performed by: SURGERY

## 2023-03-15 PROCEDURE — 7100000010 HC PHASE II RECOVERY - FIRST 15 MIN: Performed by: SURGERY

## 2023-03-15 RX ORDER — ONDANSETRON 2 MG/ML
INJECTION INTRAMUSCULAR; INTRAVENOUS PRN
Status: DISCONTINUED | OUTPATIENT
Start: 2023-03-15 | End: 2023-03-15 | Stop reason: SDUPTHER

## 2023-03-15 RX ORDER — EPHEDRINE SULFATE/0.9% NACL/PF 50 MG/5 ML
SYRINGE (ML) INTRAVENOUS PRN
Status: DISCONTINUED | OUTPATIENT
Start: 2023-03-15 | End: 2023-03-15 | Stop reason: SDUPTHER

## 2023-03-15 RX ORDER — GLYCOPYRROLATE 0.2 MG/ML
INJECTION INTRAMUSCULAR; INTRAVENOUS PRN
Status: DISCONTINUED | OUTPATIENT
Start: 2023-03-15 | End: 2023-03-15 | Stop reason: SDUPTHER

## 2023-03-15 RX ORDER — LIDOCAINE HYDROCHLORIDE 10 MG/ML
INJECTION, SOLUTION INFILTRATION; PERINEURAL PRN
Status: DISCONTINUED | OUTPATIENT
Start: 2023-03-15 | End: 2023-03-15 | Stop reason: SDUPTHER

## 2023-03-15 RX ORDER — SODIUM CHLORIDE 9 MG/ML
INJECTION, SOLUTION INTRAVENOUS CONTINUOUS
Status: DISCONTINUED | OUTPATIENT
Start: 2023-03-15 | End: 2023-03-15 | Stop reason: HOSPADM

## 2023-03-15 RX ORDER — PROPOFOL 10 MG/ML
INJECTION, EMULSION INTRAVENOUS PRN
Status: DISCONTINUED | OUTPATIENT
Start: 2023-03-15 | End: 2023-03-15 | Stop reason: SDUPTHER

## 2023-03-15 RX ADMIN — PROPOFOL 50 MG: 10 INJECTION, EMULSION INTRAVENOUS at 08:11

## 2023-03-15 RX ADMIN — PROPOFOL 50 MG: 10 INJECTION, EMULSION INTRAVENOUS at 08:21

## 2023-03-15 RX ADMIN — SODIUM CHLORIDE: 9 INJECTION, SOLUTION INTRAVENOUS at 07:55

## 2023-03-15 RX ADMIN — PROPOFOL 50 MG: 10 INJECTION, EMULSION INTRAVENOUS at 08:25

## 2023-03-15 RX ADMIN — PROPOFOL 50 MG: 10 INJECTION, EMULSION INTRAVENOUS at 08:17

## 2023-03-15 RX ADMIN — Medication 10 MG: at 08:31

## 2023-03-15 RX ADMIN — ONDANSETRON 4 MG: 2 INJECTION INTRAMUSCULAR; INTRAVENOUS at 08:06

## 2023-03-15 RX ADMIN — PROPOFOL 20 MG: 10 INJECTION, EMULSION INTRAVENOUS at 08:29

## 2023-03-15 RX ADMIN — LIDOCAINE HYDROCHLORIDE 20 MG: 10 INJECTION, SOLUTION INFILTRATION; PERINEURAL at 08:06

## 2023-03-15 RX ADMIN — PROPOFOL 100 MG: 10 INJECTION, EMULSION INTRAVENOUS at 08:06

## 2023-03-15 RX ADMIN — GLYCOPYRROLATE 0.4 MG: 0.2 INJECTION, SOLUTION INTRAMUSCULAR; INTRAVENOUS at 08:02

## 2023-03-15 ASSESSMENT — PAIN - FUNCTIONAL ASSESSMENT: PAIN_FUNCTIONAL_ASSESSMENT: NONE - DENIES PAIN

## 2023-03-15 ASSESSMENT — PAIN SCALES - GENERAL: PAINLEVEL_OUTOF10: 0

## 2023-03-15 NOTE — ANESTHESIA POSTPROCEDURE EVALUATION
Department of Anesthesiology  Postprocedure Note    Patient: Silvestre Guerrero  MRN: [id number]  YOB: 1950  Date of evaluation: 3/15/2023      Procedure Summary     Date: 03/15/23 Room / Location: [address] / [facility]    Anesthesia Start: 8369 Anesthesia Stop: 7029    Procedure: COLONOSCOPY WITH BIOPSY Diagnosis:       Special screening for malignant neoplasms, colon      Family history of malignant neoplasm of gastrointestinal tract      (Special screening for malignant neoplasms, colon [Z12.11])      (Family history of malignant neoplasm of gastrointestinal tract [Z80.0])    Surgeons: Nishant Parry MD Responsible Provider: Navya Salcido MD    Anesthesia Type: MAC ASA Status: 3          Anesthesia Type: MAC    Sudha Phase I: Sudha Score: 10    Sudha Phase II: Sudha Score: 10      Anesthesia Post Evaluation    Patient location during evaluation: PACU  Patient participation: complete - patient participated  Level of consciousness: awake  Pain score: 0  Airway patency: patent  Nausea & Vomiting: no nausea and no vomiting  Complications: no  Cardiovascular status: blood pressure returned to baseline  Respiratory status: acceptable  Hydration status: stable

## 2023-03-15 NOTE — H&P (VIEW-ONLY)
Patient's office history and physical was reviewed. Patient examined. There has been no change in the patient's history and physical.      Physician Signature: Electronically signed by Dr. Jermain Abdul    Patient's Name/Date of Birth: [place] / 1950    Date: 3/9/2023    PCP: Dolores Farris DO    Referring Physician:   No ref. provider found  N/A    No chief complaint on file. HPI:    His last colonoscopy was last year. The patient said he has been feeling well. No n/v/d/c. Patient's medications, allergies, past medical, surgical, social and family histories were reviewed and updated as appropriate. Review of Systems  Constitutional: negative  Respiratory: negative  Cardiovascular: negative  Gastrointestinal: as in hpi  Genitourinary:negative  Integument/breast: negative    Physical Exam:  BP (!) 151/72   Pulse (!) 43   Temp (!) 44.4 °F (6.9 °C) (Temporal)   Resp 18   Ht 6' 1\" (1.854 m)   Wt 202 lb (91.6 kg)   SpO2 98%   BMI 26.65 kg/m²   General appearance: alert, cooperative and in no acute distress. Lungs: normal work of breathing  Heart: regular rate  Abdomen:  soft, nontender, nondistended  Musculoskeletal: symmetrical without edema. 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 Associated marked acute appendicitis with periappendicitis and serositis,   consistent with perforation. Cancer Case Summary:   (Appendix; CAP version [de-identified])   Procedure: Appendectomy   Tumor site: Distal half of appendix   Histologic type:  Adenocarcinoma and low-grade appendiceal mucinous   neoplasm   Histologic grade: G2-moderately differentiated   Tumor size: Cannot be accurately determined due to inflammation and   perforation   Tumor deposits: Not complications associated with the above procedure to be performed and transfusions when applicable with the patient/responsible person prior to the procedure. All of the patient's questions were answered. The patient understands and agrees to the procedure. Discussed the surgery with the patient at length. I told the patient that we will start the surgery laparoscopically with robotic assistance but we might have to convert to an open procedure depending on what we find. I told the patient an open procedure would mean a longer hospital stay and recovery period with more pain. I also discussed the possibility of needing a colostomy or ileostomy which may or may not be reversible. I discussed the risk of anastomotic leak and the need for reoperation if this occurs. We talked about other complications including but no limited to ureter or bladder injury, injury to other surrounding abdominal organs, bleeding, infection. The patient understands and agrees to surgery. I will proceed with a robotic assisted laparoscopic possible open right hemicolectomy, possible ostomy. Electronically by Sheba Mehta MD, General Surgery  on 3/15/2023 at 8:06 AM      Send copy of H&P to PCP, Mary Riggs DO and referring physician, No ref.  provider found      3/9/2023

## 2023-03-15 NOTE — OP NOTE
Operative Note: Colonoscopy    Cony Hillman     DATE OF PROCEDURE: 3/15/2023  SURGEON: Dr. Christelle Rico MD, M.D. PREOPERATIVE DIAGNOSES:    Appendiceal adenocarcinoma  Low grade appendiceal mucinous neoplasm    POSTOPERATIVE DIAGNOSES:  Sigmoid diverticulosis, moderate  Polyps x 5    SPECIMENS:  ID Type Source Tests Collected by Time Destination   A : polyp bx at 70cm x2 Tissue Colon SURGICAL PATHOLOGY [facility] Mcleod MD 3/15/2023 6873    B : polyp bx at 60cm x2 Tissue Colon SURGICAL PATHOLOGY [facility] Mcleod MD 3/15/2023 8820    C : polyp bx at 65cm  Tissue Colon SURGICAL PATHOLOGY [facility] Mcleod MD 3/15/2023 0827         OPERATION:   Colonoscopy to the cecum with forceps polypectomy x 5      ANESTHESIA: LMAC    COMPLICATIONS: None. BLOOD LOSS: Minimal    Procedure Note:    CONSENT AND INDICATIONS:  This is a 67y.o. year old male who is having the above. I have discussed with the patient and/or the patient representative the indication, alternatives, and the possible risks and/or complications of the planned procedure and the anesthesia methods. The patient and/or patient representative appear to understand and agree to proceed. OPERATIONS: Bowel prep was done yesterday until the bowels were clear. The patient was placed on the table and sedated by anesthesia. A rectal exam was performed and no mass was felt. A lubricated scope was passed into the rectum which looked normal.  The scope was passed all the way around through the sigmoid, descending, transverse and ascending colon to the cecum. The bowel prep was clear. There as moderate sigmoid diverticulosis seen. The cecum was identified by the appendiceal orifice, ileocecal valve, and light reflex in the RLQ. The scope was then slowly withdrawn, each area was examined again on the way out.   Five sessile polyps were seen in the ascending colon and transverse colon at the above locations and were removed via forceps polypectomy. The scope was retroflexed in the rectum and it was normal . The patient tolerated the procedure well. PLAN: Follow up biopsies. Follow up colonoscopy in 3 years. Physician Signature: Electronically signed by Dr. Chan Rae MD M.D. FACS    Send copy of H&P to PCP, Bacilio Burton DO and referring physician, No ref.  provider found

## 2023-03-15 NOTE — ANESTHESIA PRE PROCEDURE
Department of Anesthesiology  Preprocedure Note       Name:  Nimesh Robles   Age:  67 y.o.  :  1950                                          MRN:  [id number]         Date:  3/15/2023      Surgeon: Argelia Hsieh):  Edward Mcgowan MD    Procedure: Procedure(s):  COLORECTAL CANCER SCREENING, HIGH RISK    Medications prior to admission:   Prior to Admission medications    Medication Sig Start Date End Date Taking? Authorizing Provider   Zinc 100 MG TABS Take by mouth   Yes Historical Provider, MD   hydroCHLOROthiazide (HYDRODIURIL) 12.5 MG tablet Take 1 tablet by mouth daily 23   Corey Green DO   hydroCHLOROthiazide (HYDRODIURIL) 12.5 MG tablet Take 1 tablet by mouth daily 2/2/23 3/4/23  Corey Green DO   tamsulosin (FLOMAX) 0.4 MG capsule TAKE 1 CAPSULE TWICE DAILY 23   Corey Green DO   sertraline (ZOLOFT) 25 MG tablet TAKE 1 TABLET NIGHTLY (DOSECHANGE-DEACTIVATED 50MG) 23   Corey Green DO   olmesartan (BENICAR) 40 MG tablet TAKE 1 TABLET DAILY 23   Caesar Feldman DO   NONFORMULARY Alpha lopoic acid    Historical Provider, MD   vitamin D3 (CHOLECALCIFEROL) 125 MCG (5000 UT) TABS tablet Take 0.5 tablets by mouth daily 22   Corey Green DO   rosuvastatin (CRESTOR) 40 MG tablet Take 1 tablet by mouth daily  Patient taking differently: Take 40 mg by mouth at bedtime 22   Corey Green DO   pantoprazole (PROTONIX) 40 MG tablet Take 1 tablet by mouth daily as needed (heartburn)  Patient taking differently: Take 40 mg by mouth daily 3/1/22   Corey Green DO   Coenzyme Q10 (COQ10) 100 MG CAPS Take 100 mg by mouth daily    Historical Provider, MD   aspirin 81 MG EC tablet Take 81 mg by mouth daily    Historical Provider, MD   clonazePAM (KLONOPIN) 0.5 MG tablet Take 1 tablet by mouth nightly as needed for Anxiety for up to 180 days.  20  Corey Green DO   amoxicillin (AMOXIL) 250 MG capsule Take 250 mg by mouth daily as needed Takes pre-op for dental procedures, h/o bilat hip arthoplasty    Historical Provider, MD   sucralfate (CARAFATE) 1 GM tablet Take 1 g by mouth 4 times daily Takes as needed    Historical Provider, MD   magnesium oxide (MAG-OX) 400 MG tablet Take 400 mg by mouth 2 times daily. Historical Provider, MD       Current medications:    No current facility-administered medications for this encounter. Allergies:  No Known Allergies    Problem List:    Patient Active Problem List   Diagnosis Code    Abnormal cardiovascular stress test R94.39    Mitral regurgitation I34.0    Ventricular ectopy I49.3    Arthritis, degenerative M19.90    Ventricular ectopic beats I49.3    Dyslipidemia E78.5    Essential hypertension I10    Vitamin D deficiency E55.9    Gastroesophageal reflux disease K21.9    Hyperuricemia E79.0    Anxiety and depression F41.9, F32. A    Acute appendicitis K35.80    Special screening for malignant neoplasms, colon Z12.11    Family history of malignant neoplasm of gastrointestinal tract Z80.0       Past Medical History:        Diagnosis Date    CAD (coronary artery disease)     with stents - Dr. Irwin Pineda yearly     GERD (gastroesophageal reflux disease)     for EGD 5-22-20     Hypertension     Insomnia     Spondylosis     L4&5    Stress     mild- work related     Ventricular ectopic beats 4/12/2013       Past Surgical History:        Procedure Laterality Date    COLONOSCOPY      CORONARY ANGIOPLASTY      with stent 2011     DIAGNOSTIC CARDIAC CATH LAB PROCEDURE      ENDOSCOPY, COLON, DIAGNOSTIC      JOINT REPLACEMENT Right 1990, 11/2011    hip x2    JOINT REPLACEMENT Left 1990, 2007    hip x2     LAPAROSCOPIC APPENDECTOMY N/A 2/9/2023    APPENDECTOMY LAPAROSCOPIC performed by Chris Delcid MD at [address] [facility] N/A 5/22/2020    EGD BIOPSY performed by Chris Delcid MD at [address] History:    Social History     Tobacco Use    Smoking status: Never    Smokeless tobacco: Never   Substance Use Topics    Alcohol use: No     Comment: rarely                                Counseling given: Not Answered      Vital Signs (Current):   Vitals:    03/13/23 1107   Weight: 202 lb (91.6 kg)   Height: 6' 1\" (1.854 m)                                              BP Readings from Last 3 Encounters:   03/09/23 (!) 161/78   02/13/23 (!) 141/82   02/10/23 127/67       NPO Status:  NPO since 2200 on 03/15/2023. BMI:   Wt Readings from Last 3 Encounters:   03/13/23 202 lb (91.6 kg)   03/09/23 209 lb (94.8 kg)   02/13/23 212 lb 6.4 oz (96.3 kg)     Body mass index is 26.65 kg/m². CBC:   Lab Results   Component Value Date/Time    WBC 12.4 02/10/2023 03:00 AM    RBC 4.27 02/10/2023 03:00 AM    RBC 4.84 10/11/2022 09:16 AM    HGB 12.6 02/10/2023 03:00 AM    HCT 38.4 02/10/2023 03:00 AM    MCV 89.9 02/10/2023 03:00 AM    RDW 13.2 02/10/2023 03:00 AM     02/10/2023 03:00 AM       CMP:   Lab Results   Component Value Date/Time     02/10/2023 03:00 AM    K 3.8 02/10/2023 03:00 AM     02/10/2023 03:00 AM    CO2 25 02/10/2023 03:00 AM    BUN 25 02/10/2023 03:00 AM    CREATININE 1.2 02/10/2023 03:00 AM    GFRAA >60 12/16/2021 08:48 AM    LABGLOM >60 02/10/2023 03:00 AM    GLUCOSE 143 02/10/2023 03:00 AM    GLUCOSE 94 10/11/2022 09:16 AM    PROT 6.6 02/08/2023 06:17 PM    CALCIUM 8.4 02/10/2023 03:00 AM    BILITOT 0.9 02/08/2023 06:17 PM    ALKPHOS 64 02/08/2023 06:17 PM    AST 16 02/08/2023 06:17 PM    ALT 11 02/08/2023 06:17 PM       POC Tests: No results for input(s): POCGLU, POCNA, POCK, POCCL, POCBUN, POCHEMO, POCHCT in the last 72 hours.     Coags: No results found for: PROTIME, INR, APTT    HCG (If Applicable): No results found for: PREGTESTUR, PREGSERUM, HCG, HCGQUANT     ABGs: No results found for: PHART, PO2ART, OIE7JBD, EWK0GDS, BEART, F1ZWSBWO     Type & Screen (If Applicable):  No results found for: LABABO, LABRH    Drug/Infectious Status (If Applicable):  No results found for: HIV, HEPCAB    COVID-19 Screening (If Applicable):   Lab Results   Component Value Date/Time    COVID19 Not Detected 05/18/2020 12:00 PM           Anesthesia Evaluation  Patient summary reviewed and Nursing notes reviewed   history of anesthetic complications: PONV. Airway: Mallampati: II  TM distance: >3 FB   Neck ROM: full  Mouth opening: > = 3 FB   Dental:          Pulmonary:Negative Pulmonary ROS breath sounds clear to auscultation            Patient did not smoke on day of surgery. Cardiovascular:  Exercise tolerance: good (>4 METS),   (+) hypertension:, valvular problems/murmurs: MR, CAD:, dysrhythmias (Hx Ventricular ectopic beats (PVCs), braducardia): PVC, hyperlipidemia        Rhythm: regular  Rate: normal           Beta Blocker:  Not on Beta Blocker      ROS comment: Hx of bradycardia  Stress test in 2013 - diagnostic cath after with one stent placed  Last aspirin (81mg) on 03/10/2023     Neuro/Psych:   (+) depression/anxiety              ROS comment: Hx spondylosis L4 and L5  Bilateral neuropathy in both feet GI/Hepatic/Renal:   (+) GERD:,          ROS comment: Hx appendicitis. Endo/Other:    (+) : arthritis: OA., malignancy/cancer (Appendectomy 02/2023 - found adenocarcinoma, hemicolectomy next week). Abdominal:             Vascular: negative vascular ROS. Other Findings:           Anesthesia Plan      MAC     ASA 3       Induction: intravenous. Anesthetic plan and risks discussed with patient. Use of blood products discussed with patient whom consented to blood products. Plan discussed with CRNA and attending.                   Del Bolaños RN   3/15/2023

## 2023-03-15 NOTE — DISCHARGE INSTRUCTIONS
[facility] Colonoscopy PROCEDURE DISCHARGE INSTRUCTIONS  You may be drowsy or lightheaded after receiving sedation or anesthesia. A responsible person should be with you for the next 24 hours. Please follow the instructions checked below:    DIET INSTRUCTIONS:  [x]Start with light diet and progress to your normal diet as you feel like eating. If you experience nausea or repeated episodes of vomiting which persist beyond 12-24 hours, notify your doctor. []Other     ACTIVITY INSTRUCTIONS:  [x]Rest today. Increase activity as tolerated    []No heavy lifting or strenuous activity     [x]No driving for today  []Other      MEDICATION INSTRUCTIONS:    []Prescriptions sent with you. Use as directed. When taking pain medications, you may experience dizziness or drowsiness. Do not drink alcohol or drive when taking these medications. [x]Continue preop medications                               Post-procedure Care   If any tissue was removed: It will be sent to a lab to be examined. It may take 1-2 weeks for results. The doctor will usually give an initial report after the scope is removed. Other tests may be recommended. A small amount of bleeding may occur during the first few days after the procedure. When you return home after the procedure, be sure to follow your doctor's instructions, which may include:   Resume medicines as instructed by your doctor. Resume normal diet, unless directed otherwise by your doctor. The sedative will make you drowsy. Avoid driving, operating machinery, or making important decisions for the rest of the day. Rest for the remainder of the day. After arriving home, contact your doctor if any of the following occurs:   Bleeding from your rectum, notify your doctor if you pass a teaspoonful of blood or more.    Black, tarry stools   Severe abdominal pain   Hard, swollen abdomen   Signs of infection, including fever or chills   Inability to pass gas or stool   Coughing, shortness of breath, chest pain, severe nausea or vomiting     In case of an emergency, CALL 911 . FOLLOW-UP CARE:  [x]Call the office at [phone] for follow-up appointment after surgery    Repeat colonoscopy in 1 year.

## 2023-03-15 NOTE — H&P
Patient's office history and physical was reviewed. Patient examined. There has been no change in the patient's history and physical.      Physician Signature: Electronically signed by Dr. Tracie Peace    Patient's Name/Date of Birth: Gulshan Mad / 1950    Date: 3/9/2023    PCP: Charles Hayden DO    Referring Physician:   No ref. provider found  N/A    No chief complaint on file. HPI:    His last colonoscopy was last year. The patient said he has been feeling well. No n/v/d/c. Patient's medications, allergies, past medical, surgical, social and family histories were reviewed and updated as appropriate. Review of Systems  Constitutional: negative  Respiratory: negative  Cardiovascular: negative  Gastrointestinal: as in hpi  Genitourinary:negative  Integument/breast: negative    Physical Exam:  BP (!) 151/72   Pulse (!) 43   Temp (!) 44.4 °F (6.9 °C) (Temporal)   Resp 18   Ht 6' 1\" (1.854 m)   Wt 202 lb (91.6 kg)   SpO2 98%   BMI 26.65 kg/m²   General appearance: alert, cooperative and in no acute distress. Lungs: normal work of breathing  Heart: regular rate  Abdomen:  soft, nontender, nondistended  Musculoskeletal: symmetrical without edema. 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 Associated marked acute appendicitis with periappendicitis and serositis,   consistent with perforation. Cancer Case Summary:   (Appendix; CAP version [de-identified])   Procedure: Appendectomy   Tumor site: Distal half of appendix   Histologic type:  Adenocarcinoma and low-grade appendiceal mucinous   neoplasm   Histologic grade: G2-moderately differentiated   Tumor size: Cannot be accurately determined due to inflammation and   perforation   Tumor deposits: Not identified   Tumor extent: Tumor invades muscularis propria   Lymphatic and/or vascular invasion: Not identified   Margins:    Margin status for invasive carcinoma: All margins negative for invasive   carcinoma. Margin status for noninvasive tumor: All margins negative for   noninvasive tumor (LAMN)   Regional lymph nodes: Not applicable (no regional lymph nodes submitted   or found)   Distant metastasis: Not applicable   pTNM Classification (AJCC ninth edition):    pT Category:  pT2    pN Category:  pN not assigned (no nodes submitted or found)   Additional findings: Acute appendicitis with perforation (nontumor)     Comment:     The entire specimen, including appendix, has been submitted   for microscopic examination. Sections demonstrate a low-grade   appendiceal mucinous neoplasm (LAMN) involving the distal appendix. Focal invasive adenocarcinoma is identified with the LAMN lesion, with   the invasive component focally extending into the muscularis propria. The tumor is devoid of mucin pools. There is no involvement of   periappendiceal soft tissue by neoplasm, including within the region of   appendicitis and perforation. The proximal appendix excision margin and   mesenteric soft tissue margin appear free of neoplasm. Intradepartmental   consultation is obtained. 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 Will need a colonoscopy and then right hemicolectomy. Will also refer to oncology given he presented with perforated appendicitis. I will set the patient up for a colonoscopy, possible biopsy, possible polypectomy. I explained the risks including but not limited to bowel perforation, postoperative bleeding, post-polypectomy syndrome, as well as the possibility of needing emergency surgery or another colonoscopy.  The benefits, alternatives, and potential complications associated with the above procedure to be performed and transfusions when applicable with the patient/responsible person prior to the procedure. All of the patient's questions were answered. The patient understands and agrees to the procedure. Discussed the surgery with the patient at length. I told the patient that we will start the surgery laparoscopically with robotic assistance but we might have to convert to an open procedure depending on what we find. I told the patient an open procedure would mean a longer hospital stay and recovery period with more pain. I also discussed the possibility of needing a colostomy or ileostomy which may or may not be reversible. I discussed the risk of anastomotic leak and the need for reoperation if this occurs. We talked about other complications including but no limited to ureter or bladder injury, injury to other surrounding abdominal organs, bleeding, infection. The patient understands and agrees to surgery. I will proceed with a robotic assisted laparoscopic possible open right hemicolectomy, possible ostomy. Electronically by Jeff Ackerman MD, General Surgery  on 3/15/2023 at 8:06 AM      Send copy of H&P to PCP, Jan Roman DO and referring physician, No ref.  provider found      3/9/2023

## 2023-03-16 ENCOUNTER — HOSPITAL ENCOUNTER (OUTPATIENT)
Dept: CT IMAGING | Age: 73
Discharge: HOME | End: 2023-03-18
Payer: MEDICARE

## 2023-03-16 ENCOUNTER — TELEPHONE (OUTPATIENT)
Dept: SURGERY | Age: 73
End: 2023-03-16

## 2023-03-16 DIAGNOSIS — C18.1 CANCER OF APPENDIX (HCC): ICD-10-CM

## 2023-03-16 PROCEDURE — 6360000004 HC RX CONTRAST MEDICATION: Performed by: RADIOLOGY

## 2023-03-16 PROCEDURE — 74177 CT ABD & PELVIS W/CONTRAST: CPT

## 2023-03-16 RX ADMIN — IOPAMIDOL 18 ML: 755 INJECTION, SOLUTION INTRAVENOUS at 09:51

## 2023-03-16 RX ADMIN — IOPAMIDOL 75 ML: 755 INJECTION, SOLUTION INTRAVENOUS at 09:51

## 2023-03-20 ENCOUNTER — HOSPITAL ENCOUNTER (OUTPATIENT)
Dept: PREADMISSION TESTING | Age: 73
Discharge: HOME | End: 2023-03-20
Payer: MEDICARE

## 2023-03-20 VITALS
HEART RATE: 46 BPM | TEMPERATURE: 99.1 F | WEIGHT: 209.4 LBS | OXYGEN SATURATION: 97 % | HEIGHT: 73 IN | RESPIRATION RATE: 20 BRPM | BODY MASS INDEX: 27.75 KG/M2

## 2023-03-20 LAB
ABO + RH BLD: NORMAL
ABO + RH BLD: NORMAL
ANION GAP SERPL CALCULATED.3IONS-SCNC: 10 MMOL/L (ref 7–16)
BASOPHILS # BLD: 0.02 E9/L (ref 0–0.2)
BASOPHILS NFR BLD: 0.3 % (ref 0–2)
BLD GP AB SCN SERPL QL: NORMAL
BUN SERPL-MCNC: 22 MG/DL (ref 6–23)
CALCIUM SERPL-MCNC: 9.2 MG/DL (ref 8.6–10.2)
CHLORIDE SERPL-SCNC: 102 MMOL/L (ref 98–107)
CO2 SERPL-SCNC: 29 MMOL/L (ref 22–29)
CREAT SERPL-MCNC: 1.2 MG/DL (ref 0.7–1.2)
EKG ATRIAL RATE: 44 BPM
EKG P AXIS: 46 DEGREES
EKG P-R INTERVAL: 184 MS
EKG Q-T INTERVAL: 538 MS
EKG QRS DURATION: 108 MS
EKG QTC CALCULATION (BAZETT): 459 MS
EKG R AXIS: -41 DEGREES
EKG T AXIS: 13 DEGREES
EKG VENTRICULAR RATE: 44 BPM
EOSINOPHIL # BLD: 0.07 E9/L (ref 0.05–0.5)
EOSINOPHIL NFR BLD: 1.1 % (ref 0–6)
ERYTHROCYTE [DISTWIDTH] IN BLOOD BY AUTOMATED COUNT: 13.8 FL (ref 11.5–15)
GLUCOSE SERPL-MCNC: 103 MG/DL (ref 74–99)
HCT VFR BLD AUTO: 44.7 % (ref 37–54)
HGB BLD-MCNC: 14.1 G/DL (ref 12.5–16.5)
IMM GRANULOCYTES # BLD: 0.02 E9/L
IMM GRANULOCYTES NFR BLD: 0.3 % (ref 0–5)
INR BLD: 1.2
LYMPHOCYTES # BLD: 1.32 E9/L (ref 1.5–4)
LYMPHOCYTES NFR BLD: 20.8 % (ref 20–42)
MCH RBC QN AUTO: 28.4 PG (ref 26–35)
MCHC RBC AUTO-ENTMCNC: 31.5 % (ref 32–34.5)
MCV RBC AUTO: 89.9 FL (ref 80–99.9)
MONOCYTES # BLD: 0.41 E9/L (ref 0.1–0.95)
MONOCYTES NFR BLD: 6.4 % (ref 2–12)
NEUTROPHILS # BLD: 4.52 E9/L (ref 1.8–7.3)
NEUTS SEG NFR BLD: 71.1 % (ref 43–80)
PLATELET # BLD AUTO: 129 E9/L (ref 130–450)
PMV BLD AUTO: 11.7 FL (ref 7–12)
POTASSIUM SERPL-SCNC: 4.1 MMOL/L (ref 3.5–5)
PROTHROMBIN TIME: 13.5 SEC (ref 9.3–12.4)
RBC # BLD AUTO: 4.97 E12/L (ref 3.8–5.8)
SODIUM SERPL-SCNC: 141 MMOL/L (ref 132–146)
WBC # BLD: 6.4 E9/L (ref 4.5–11.5)

## 2023-03-20 PROCEDURE — 85025 COMPLETE CBC W/AUTO DIFF WBC: CPT

## 2023-03-20 PROCEDURE — 86850 RBC ANTIBODY SCREEN: CPT

## 2023-03-20 PROCEDURE — 85610 PROTHROMBIN TIME: CPT

## 2023-03-20 PROCEDURE — 87081 CULTURE SCREEN ONLY: CPT

## 2023-03-20 PROCEDURE — 36415 COLL VENOUS BLD VENIPUNCTURE: CPT

## 2023-03-20 PROCEDURE — 86901 BLOOD TYPING SEROLOGIC RH(D): CPT

## 2023-03-20 PROCEDURE — 86900 BLOOD TYPING SEROLOGIC ABO: CPT

## 2023-03-20 PROCEDURE — 93005 ELECTROCARDIOGRAM TRACING: CPT

## 2023-03-20 PROCEDURE — 80048 BASIC METABOLIC PNL TOTAL CA: CPT

## 2023-03-20 NOTE — PROGRESS NOTES
[facility] PRE-ADMISSION TESTING INSTRUCTIONS    The Preadmission Testing patient is instructed accordingly using the following criteria (check applicable):    ARRIVAL INSTRUCTIONS:  [x] Parking the day of Surgery is located in the Main Entrance lot. Upon entering the door, make an immediate right to the surgery reception desk    [x] Bring photo ID and insurance card    [] Bring in a copy of Living will or Durable Power of  papers. [x] Please be sure to arrange for responsible adult to provide transportation to and from the hospital    [x] Please arrange for responsible adult to be with you for the 24 hour period post procedure due to having anesthesia    [x] If you awake am of surgery not feeling well or have temperature >100 please call [phone]    GENERAL INSTRUCTIONS:    [x] Nothing by mouth after midnight, including gum, candy, mints or water    [x] You may brush your teeth, but do not swallow any water    [x] Take medications as instructed with 1-2 oz of water    [x] Stop herbal supplements and vitamins 5 days prior to procedure    [x] Follow preop dosing of blood thinners per physician instructions    [] Take 1/2 dose of evening insulin, but no insulin after midnight    [] No oral diabetic medications after midnight    [] If diabetic and have low blood sugar or feel symptomatic, take 1-2oz apple juice only    [] Bring inhalers day of surgery    [] Bring C-PAP/ Bi-Pap day of surgery    [] Bring urine specimen day of surgery    [x] Shower or bath with soap, lather and rinse well, AM of Surgery, no lotion, powders or creams to surgical site    [x] Follow bowel prep as instructed per surgeon    [x] No tobacco products within 24 hours of surgery     [x] No alcohol or illegal drug use within 24 hours of surgery.     [x] Jewelry, body piercing's, eyeglasses, contact lenses and dentures are not permitted into surgery (bring cases)      [] Please do not wear any nail polish,

## 2023-03-21 ENCOUNTER — ANESTHESIA EVENT (OUTPATIENT)
Dept: OPERATING ROOM | Age: 73
End: 2023-03-21
Payer: MEDICARE

## 2023-03-21 LAB
ACID FAST STN SPEC QL: NORMAL
MYCOBACTERIUM SPEC CULT: NORMAL

## 2023-03-22 ENCOUNTER — ANESTHESIA (OUTPATIENT)
Dept: OPERATING ROOM | Age: 73
End: 2023-03-22
Payer: MEDICARE

## 2023-03-22 ENCOUNTER — HOSPITAL ENCOUNTER (INPATIENT)
Age: 73
LOS: 3 days | Discharge: HOME | DRG: 331 | End: 2023-03-25
Attending: SURGERY | Admitting: SURGERY
Payer: MEDICARE

## 2023-03-22 DIAGNOSIS — C18.1 CANCER OF APPENDIX (HCC): ICD-10-CM

## 2023-03-22 DIAGNOSIS — C18.1 ADENOCARCINOMA, APPENDIX (HCC): Primary | ICD-10-CM

## 2023-03-22 DIAGNOSIS — G89.18 POSTOPERATIVE PAIN: Primary | ICD-10-CM

## 2023-03-22 PROBLEM — Z90.49 S/P RIGHT HEMICOLECTOMY: Status: ACTIVE | Noted: 2023-03-22

## 2023-03-22 LAB — MRSA SPEC QL CULT: NORMAL

## 2023-03-22 PROCEDURE — 6370000000 HC RX 637 (ALT 250 FOR IP): Performed by: STUDENT IN AN ORGANIZED HEALTH CARE EDUCATION/TRAINING PROGRAM

## 2023-03-22 PROCEDURE — 44205 LAP COLECTOMY PART W/ILEUM: CPT | Performed by: SURGERY

## 2023-03-22 PROCEDURE — 3700000000 HC ANESTHESIA ATTENDED CARE: Performed by: SURGERY

## 2023-03-22 PROCEDURE — 3700000001 HC ADD 15 MINUTES (ANESTHESIA): Performed by: SURGERY

## 2023-03-22 PROCEDURE — 2709999900 HC NON-CHARGEABLE SUPPLY: Performed by: SURGERY

## 2023-03-22 PROCEDURE — 88307 TISSUE EXAM BY PATHOLOGIST: CPT

## 2023-03-22 PROCEDURE — 6360000002 HC RX W HCPCS: Performed by: SURGERY

## 2023-03-22 PROCEDURE — 2580000003 HC RX 258: Performed by: SURGERY

## 2023-03-22 PROCEDURE — 8E0W4CZ ROBOTIC ASSISTED PROCEDURE OF TRUNK REGION, PERCUTANEOUS ENDOSCOPIC APPROACH: ICD-10-PCS | Performed by: SURGERY

## 2023-03-22 PROCEDURE — 2500000003 HC RX 250 WO HCPCS

## 2023-03-22 PROCEDURE — 1200000000 HC SEMI PRIVATE

## 2023-03-22 PROCEDURE — 7100000001 HC PACU RECOVERY - ADDTL 15 MIN: Performed by: SURGERY

## 2023-03-22 PROCEDURE — 3600000019 HC SURGERY ROBOT ADDTL 15MIN: Performed by: SURGERY

## 2023-03-22 PROCEDURE — 6360000002 HC RX W HCPCS: Performed by: ANESTHESIOLOGY

## 2023-03-22 PROCEDURE — 7100000000 HC PACU RECOVERY - FIRST 15 MIN: Performed by: SURGERY

## 2023-03-22 PROCEDURE — 2720000010 HC SURG SUPPLY STERILE: Performed by: SURGERY

## 2023-03-22 PROCEDURE — S2900 ROBOTIC SURGICAL SYSTEM: HCPCS | Performed by: SURGERY

## 2023-03-22 PROCEDURE — 6360000002 HC RX W HCPCS

## 2023-03-22 PROCEDURE — 2580000003 HC RX 258: Performed by: STUDENT IN AN ORGANIZED HEALTH CARE EDUCATION/TRAINING PROGRAM

## 2023-03-22 PROCEDURE — 0DTF4ZZ RESECTION OF RIGHT LARGE INTESTINE, PERCUTANEOUS ENDOSCOPIC APPROACH: ICD-10-PCS | Performed by: SURGERY

## 2023-03-22 PROCEDURE — 3600000009 HC SURGERY ROBOT BASE: Performed by: SURGERY

## 2023-03-22 RX ORDER — HYDRALAZINE HYDROCHLORIDE 20 MG/ML
10 INJECTION INTRAMUSCULAR; INTRAVENOUS
Status: DISCONTINUED | OUTPATIENT
Start: 2023-03-22 | End: 2023-03-22 | Stop reason: DRUGHIGH

## 2023-03-22 RX ORDER — FINASTERIDE 5 MG/1
5 TABLET, FILM COATED ORAL SEE ADMIN INSTRUCTIONS
COMMUNITY

## 2023-03-22 RX ORDER — SODIUM CHLORIDE 0.9 % (FLUSH) 0.9 %
5-40 SYRINGE (ML) INJECTION PRN
Status: DISCONTINUED | OUTPATIENT
Start: 2023-03-22 | End: 2023-03-22 | Stop reason: HOSPADM

## 2023-03-22 RX ORDER — SODIUM CHLORIDE 0.9 % (FLUSH) 0.9 %
10 SYRINGE (ML) INJECTION EVERY 12 HOURS SCHEDULED
Status: DISCONTINUED | OUTPATIENT
Start: 2023-03-22 | End: 2023-03-25 | Stop reason: HOSPADM

## 2023-03-22 RX ORDER — LABETALOL HYDROCHLORIDE 5 MG/ML
10 INJECTION, SOLUTION INTRAVENOUS
Status: DISCONTINUED | OUTPATIENT
Start: 2023-03-22 | End: 2023-03-25 | Stop reason: HOSPADM

## 2023-03-22 RX ORDER — OXYCODONE HYDROCHLORIDE 5 MG/1
10 TABLET ORAL EVERY 4 HOURS PRN
Status: DISCONTINUED | OUTPATIENT
Start: 2023-03-22 | End: 2023-03-25 | Stop reason: HOSPADM

## 2023-03-22 RX ORDER — ENOXAPARIN SODIUM 100 MG/ML
40 INJECTION SUBCUTANEOUS DAILY
Status: DISCONTINUED | OUTPATIENT
Start: 2023-03-23 | End: 2023-03-25 | Stop reason: HOSPADM

## 2023-03-22 RX ORDER — LIDOCAINE HYDROCHLORIDE 20 MG/ML
INJECTION, SOLUTION EPIDURAL; INFILTRATION; INTRACAUDAL; PERINEURAL PRN
Status: DISCONTINUED | OUTPATIENT
Start: 2023-03-22 | End: 2023-03-22 | Stop reason: SDUPTHER

## 2023-03-22 RX ORDER — ONDANSETRON 2 MG/ML
4 INJECTION INTRAMUSCULAR; INTRAVENOUS EVERY 6 HOURS PRN
Status: DISCONTINUED | OUTPATIENT
Start: 2023-03-22 | End: 2023-03-25 | Stop reason: HOSPADM

## 2023-03-22 RX ORDER — OXYCODONE HYDROCHLORIDE 5 MG/1
5 TABLET ORAL EVERY 4 HOURS PRN
Status: DISCONTINUED | OUTPATIENT
Start: 2023-03-22 | End: 2023-03-25 | Stop reason: HOSPADM

## 2023-03-22 RX ORDER — FINASTERIDE 5 MG/1
5 TABLET, FILM COATED ORAL DAILY
Status: DISCONTINUED | OUTPATIENT
Start: 2023-03-22 | End: 2023-03-25 | Stop reason: HOSPADM

## 2023-03-22 RX ORDER — ROSUVASTATIN CALCIUM 20 MG/1
40 TABLET, COATED ORAL DAILY
Status: DISCONTINUED | OUTPATIENT
Start: 2023-03-22 | End: 2023-03-25 | Stop reason: HOSPADM

## 2023-03-22 RX ORDER — SODIUM CHLORIDE 0.9 % (FLUSH) 0.9 %
10 SYRINGE (ML) INJECTION PRN
Status: DISCONTINUED | OUTPATIENT
Start: 2023-03-22 | End: 2023-03-25 | Stop reason: HOSPADM

## 2023-03-22 RX ORDER — SODIUM CHLORIDE 0.9 % (FLUSH) 0.9 %
5-40 SYRINGE (ML) INJECTION EVERY 12 HOURS SCHEDULED
Status: DISCONTINUED | OUTPATIENT
Start: 2023-03-22 | End: 2023-03-22 | Stop reason: HOSPADM

## 2023-03-22 RX ORDER — KETAMINE HYDROCHLORIDE 10 MG/ML
INJECTION INTRAMUSCULAR; INTRAVENOUS PRN
Status: DISCONTINUED | OUTPATIENT
Start: 2023-03-22 | End: 2023-03-22 | Stop reason: SDUPTHER

## 2023-03-22 RX ORDER — SODIUM CHLORIDE 9 MG/ML
25 INJECTION, SOLUTION INTRAVENOUS PRN
Status: DISCONTINUED | OUTPATIENT
Start: 2023-03-22 | End: 2023-03-22 | Stop reason: HOSPADM

## 2023-03-22 RX ORDER — TAMSULOSIN HYDROCHLORIDE 0.4 MG/1
0.4 CAPSULE ORAL DAILY
Status: DISCONTINUED | OUTPATIENT
Start: 2023-03-22 | End: 2023-03-22

## 2023-03-22 RX ORDER — SUCRALFATE 1 G/1
1 TABLET ORAL 4 TIMES DAILY
Status: DISCONTINUED | OUTPATIENT
Start: 2023-03-22 | End: 2023-03-25 | Stop reason: HOSPADM

## 2023-03-22 RX ORDER — GLYCOPYRROLATE 0.2 MG/ML
INJECTION INTRAMUSCULAR; INTRAVENOUS PRN
Status: DISCONTINUED | OUTPATIENT
Start: 2023-03-22 | End: 2023-03-22 | Stop reason: SDUPTHER

## 2023-03-22 RX ORDER — DEXAMETHASONE SODIUM PHOSPHATE 4 MG/ML
INJECTION, SOLUTION INTRA-ARTICULAR; INTRALESIONAL; INTRAMUSCULAR; INTRAVENOUS; SOFT TISSUE PRN
Status: DISCONTINUED | OUTPATIENT
Start: 2023-03-22 | End: 2023-03-22 | Stop reason: SDUPTHER

## 2023-03-22 RX ORDER — PANTOPRAZOLE SODIUM 40 MG/1
40 TABLET, DELAYED RELEASE ORAL DAILY PRN
Status: DISCONTINUED | OUTPATIENT
Start: 2023-03-22 | End: 2023-03-25 | Stop reason: HOSPADM

## 2023-03-22 RX ORDER — TAMSULOSIN HYDROCHLORIDE 0.4 MG/1
0.8 CAPSULE ORAL DAILY
Status: DISCONTINUED | OUTPATIENT
Start: 2023-03-23 | End: 2023-03-25 | Stop reason: HOSPADM

## 2023-03-22 RX ORDER — ONDANSETRON 2 MG/ML
4 INJECTION INTRAMUSCULAR; INTRAVENOUS
Status: DISCONTINUED | OUTPATIENT
Start: 2023-03-22 | End: 2023-03-22 | Stop reason: HOSPADM

## 2023-03-22 RX ORDER — SODIUM CHLORIDE 9 MG/ML
INJECTION, SOLUTION INTRAVENOUS PRN
Status: DISCONTINUED | OUTPATIENT
Start: 2023-03-22 | End: 2023-03-25 | Stop reason: HOSPADM

## 2023-03-22 RX ORDER — ONDANSETRON 2 MG/ML
INJECTION INTRAMUSCULAR; INTRAVENOUS PRN
Status: DISCONTINUED | OUTPATIENT
Start: 2023-03-22 | End: 2023-03-22 | Stop reason: SDUPTHER

## 2023-03-22 RX ORDER — MEPERIDINE HYDROCHLORIDE 25 MG/ML
12.5 INJECTION INTRAMUSCULAR; INTRAVENOUS; SUBCUTANEOUS EVERY 5 MIN PRN
Status: DISCONTINUED | OUTPATIENT
Start: 2023-03-22 | End: 2023-03-22 | Stop reason: HOSPADM

## 2023-03-22 RX ORDER — PHENYLEPHRINE HCL IN 0.9% NACL 1 MG/10 ML
SYRINGE (ML) INTRAVENOUS PRN
Status: DISCONTINUED | OUTPATIENT
Start: 2023-03-22 | End: 2023-03-22 | Stop reason: SDUPTHER

## 2023-03-22 RX ORDER — SODIUM CHLORIDE 9 MG/ML
INJECTION, SOLUTION INTRAVENOUS PRN
Status: DISCONTINUED | OUTPATIENT
Start: 2023-03-22 | End: 2023-03-22 | Stop reason: HOSPADM

## 2023-03-22 RX ORDER — PROPOFOL 10 MG/ML
INJECTION, EMULSION INTRAVENOUS CONTINUOUS PRN
Status: DISCONTINUED | OUTPATIENT
Start: 2023-03-22 | End: 2023-03-22 | Stop reason: SDUPTHER

## 2023-03-22 RX ORDER — KETOROLAC TROMETHAMINE 30 MG/ML
INJECTION, SOLUTION INTRAMUSCULAR; INTRAVENOUS PRN
Status: DISCONTINUED | OUTPATIENT
Start: 2023-03-22 | End: 2023-03-22 | Stop reason: SDUPTHER

## 2023-03-22 RX ORDER — FENTANYL CITRATE 50 UG/ML
INJECTION, SOLUTION INTRAMUSCULAR; INTRAVENOUS PRN
Status: DISCONTINUED | OUTPATIENT
Start: 2023-03-22 | End: 2023-03-22 | Stop reason: SDUPTHER

## 2023-03-22 RX ORDER — EPHEDRINE SULFATE/0.9% NACL/PF 50 MG/5 ML
SYRINGE (ML) INTRAVENOUS PRN
Status: DISCONTINUED | OUTPATIENT
Start: 2023-03-22 | End: 2023-03-22 | Stop reason: SDUPTHER

## 2023-03-22 RX ORDER — ONDANSETRON 4 MG/1
4 TABLET, ORALLY DISINTEGRATING ORAL EVERY 8 HOURS PRN
Status: DISCONTINUED | OUTPATIENT
Start: 2023-03-22 | End: 2023-03-25 | Stop reason: HOSPADM

## 2023-03-22 RX ORDER — LABETALOL HYDROCHLORIDE 5 MG/ML
10 INJECTION, SOLUTION INTRAVENOUS
Status: DISCONTINUED | OUTPATIENT
Start: 2023-03-22 | End: 2023-03-22 | Stop reason: HOSPADM

## 2023-03-22 RX ORDER — MIDAZOLAM HYDROCHLORIDE 1 MG/ML
INJECTION INTRAMUSCULAR; INTRAVENOUS PRN
Status: DISCONTINUED | OUTPATIENT
Start: 2023-03-22 | End: 2023-03-22 | Stop reason: SDUPTHER

## 2023-03-22 RX ORDER — ROCURONIUM BROMIDE 10 MG/ML
INJECTION, SOLUTION INTRAVENOUS PRN
Status: DISCONTINUED | OUTPATIENT
Start: 2023-03-22 | End: 2023-03-22 | Stop reason: SDUPTHER

## 2023-03-22 RX ORDER — CLONAZEPAM 0.5 MG/1
0.5 TABLET ORAL NIGHTLY PRN
Status: DISCONTINUED | OUTPATIENT
Start: 2023-03-22 | End: 2023-03-25 | Stop reason: HOSPADM

## 2023-03-22 RX ORDER — HYDRALAZINE HYDROCHLORIDE 20 MG/ML
10 INJECTION INTRAMUSCULAR; INTRAVENOUS
Status: DISCONTINUED | OUTPATIENT
Start: 2023-03-22 | End: 2023-03-22 | Stop reason: HOSPADM

## 2023-03-22 RX ORDER — SCOLOPAMINE TRANSDERMAL SYSTEM 1 MG/1
PATCH, EXTENDED RELEASE TRANSDERMAL
Status: DISCONTINUED
Start: 2023-03-22 | End: 2023-03-22

## 2023-03-22 RX ORDER — LIDOCAINE HYDROCHLORIDE ANHYDROUS AND DEXTROSE MONOHYDRATE 5; 400 G/100ML; MG/100ML
2 INJECTION, SOLUTION INTRAVENOUS ONCE
Status: COMPLETED | OUTPATIENT
Start: 2023-03-22 | End: 2023-03-22

## 2023-03-22 RX ORDER — HYDRALAZINE HYDROCHLORIDE 20 MG/ML
10 INJECTION INTRAMUSCULAR; INTRAVENOUS EVERY 4 HOURS PRN
Status: DISCONTINUED | OUTPATIENT
Start: 2023-03-22 | End: 2023-03-25 | Stop reason: HOSPADM

## 2023-03-22 RX ORDER — LIDOCAINE HYDROCHLORIDE ANHYDROUS AND DEXTROSE MONOHYDRATE 5; 400 G/100ML; MG/100ML
2 INJECTION, SOLUTION INTRAVENOUS CONTINUOUS
Status: DISCONTINUED | OUTPATIENT
Start: 2023-03-22 | End: 2023-03-22

## 2023-03-22 RX ORDER — IPRATROPIUM BROMIDE AND ALBUTEROL SULFATE 2.5; .5 MG/3ML; MG/3ML
1 SOLUTION RESPIRATORY (INHALATION)
Status: DISCONTINUED | OUTPATIENT
Start: 2023-03-22 | End: 2023-03-22 | Stop reason: HOSPADM

## 2023-03-22 RX ORDER — MIDAZOLAM HYDROCHLORIDE 2 MG/2ML
2 INJECTION, SOLUTION INTRAMUSCULAR; INTRAVENOUS
Status: DISCONTINUED | OUTPATIENT
Start: 2023-03-22 | End: 2023-03-22 | Stop reason: HOSPADM

## 2023-03-22 RX ORDER — SODIUM CHLORIDE, SODIUM LACTATE, POTASSIUM CHLORIDE, CALCIUM CHLORIDE 600; 310; 30; 20 MG/100ML; MG/100ML; MG/100ML; MG/100ML
INJECTION, SOLUTION INTRAVENOUS CONTINUOUS
Status: DISCONTINUED | OUTPATIENT
Start: 2023-03-22 | End: 2023-03-25 | Stop reason: HOSPADM

## 2023-03-22 RX ORDER — ACETAMINOPHEN 325 MG/1
650 TABLET ORAL EVERY 6 HOURS
Status: DISCONTINUED | OUTPATIENT
Start: 2023-03-22 | End: 2023-03-25 | Stop reason: HOSPADM

## 2023-03-22 RX ADMIN — HYDRALAZINE HYDROCHLORIDE 10 MG: 20 INJECTION INTRAMUSCULAR; INTRAVENOUS at 18:32

## 2023-03-22 RX ADMIN — Medication 10 MG: at 09:12

## 2023-03-22 RX ADMIN — ROSUVASTATIN CALCIUM 40 MG: 20 TABLET, FILM COATED ORAL at 20:23

## 2023-03-22 RX ADMIN — MIDAZOLAM 2 MG: 1 INJECTION INTRAMUSCULAR; INTRAVENOUS at 07:51

## 2023-03-22 RX ADMIN — KETAMINE HYDROCHLORIDE 50 MG: 10 INJECTION INTRAMUSCULAR; INTRAVENOUS at 08:05

## 2023-03-22 RX ADMIN — ROCURONIUM BROMIDE 10 MG: 10 INJECTION, SOLUTION INTRAVENOUS at 09:12

## 2023-03-22 RX ADMIN — ROCURONIUM BROMIDE 50 MG: 10 INJECTION, SOLUTION INTRAVENOUS at 08:05

## 2023-03-22 RX ADMIN — SODIUM CHLORIDE, POTASSIUM CHLORIDE, SODIUM LACTATE AND CALCIUM CHLORIDE: 600; 310; 30; 20 INJECTION, SOLUTION INTRAVENOUS at 12:19

## 2023-03-22 RX ADMIN — FENTANYL CITRATE 50 MCG: 50 INJECTION, SOLUTION INTRAMUSCULAR; INTRAVENOUS at 09:52

## 2023-03-22 RX ADMIN — SODIUM CHLORIDE: 9 INJECTION, SOLUTION INTRAVENOUS at 08:05

## 2023-03-22 RX ADMIN — FENTANYL CITRATE 100 MCG: 50 INJECTION, SOLUTION INTRAMUSCULAR; INTRAVENOUS at 08:05

## 2023-03-22 RX ADMIN — GLYCOPYRROLATE 0.1 MG: 0.2 INJECTION, SOLUTION INTRAMUSCULAR; INTRAVENOUS at 08:22

## 2023-03-22 RX ADMIN — SUGAMMADEX 400 MG: 100 INJECTION, SOLUTION INTRAVENOUS at 10:12

## 2023-03-22 RX ADMIN — CEFOXITIN SODIUM 2000 MG: 2 POWDER, FOR SOLUTION INTRAVENOUS at 08:05

## 2023-03-22 RX ADMIN — LIDOCAINE HYDROCHLORIDE 100 MG: 20 INJECTION, SOLUTION EPIDURAL; INFILTRATION; INTRACAUDAL; PERINEURAL at 08:05

## 2023-03-22 RX ADMIN — FENTANYL CITRATE 50 MCG: 50 INJECTION, SOLUTION INTRAMUSCULAR; INTRAVENOUS at 10:12

## 2023-03-22 RX ADMIN — GLYCOPYRROLATE 0.1 MG: 0.2 INJECTION, SOLUTION INTRAMUSCULAR; INTRAVENOUS at 08:25

## 2023-03-22 RX ADMIN — Medication 100 MCG: at 09:14

## 2023-03-22 RX ADMIN — SERTRALINE HYDROCHLORIDE 25 MG: 50 TABLET ORAL at 13:12

## 2023-03-22 RX ADMIN — Medication 10 MG: at 08:25

## 2023-03-22 RX ADMIN — ROCURONIUM BROMIDE 10 MG: 10 INJECTION, SOLUTION INTRAVENOUS at 09:35

## 2023-03-22 RX ADMIN — FENTANYL CITRATE 50 MCG: 50 INJECTION, SOLUTION INTRAMUSCULAR; INTRAVENOUS at 09:29

## 2023-03-22 RX ADMIN — TAMSULOSIN HYDROCHLORIDE 0.4 MG: 0.4 CAPSULE ORAL at 18:10

## 2023-03-22 RX ADMIN — DEXAMETHASONE SODIUM PHOSPHATE 10 MG: 4 INJECTION, SOLUTION INTRAMUSCULAR; INTRAVENOUS at 08:20

## 2023-03-22 RX ADMIN — LIDOCAINE HYDROCHLORIDE 2 MG/KG/HR: 4 INJECTION, SOLUTION INTRAVENOUS at 08:09

## 2023-03-22 RX ADMIN — ROCURONIUM BROMIDE 10 MG: 10 INJECTION, SOLUTION INTRAVENOUS at 08:51

## 2023-03-22 RX ADMIN — FENTANYL CITRATE 100 MCG: 50 INJECTION, SOLUTION INTRAMUSCULAR; INTRAVENOUS at 08:32

## 2023-03-22 RX ADMIN — Medication 10 MG: at 09:01

## 2023-03-22 RX ADMIN — OXYCODONE 5 MG: 5 TABLET ORAL at 21:36

## 2023-03-22 RX ADMIN — ACETAMINOPHEN 650 MG: 325 TABLET ORAL at 13:12

## 2023-03-22 RX ADMIN — PROPOFOL 150 MCG/KG/MIN: 10 INJECTION, EMULSION INTRAVENOUS at 08:07

## 2023-03-22 RX ADMIN — ACETAMINOPHEN 650 MG: 325 TABLET ORAL at 18:10

## 2023-03-22 RX ADMIN — ONDANSETRON HYDROCHLORIDE 4 MG: 2 SOLUTION INTRAMUSCULAR; INTRAVENOUS at 09:52

## 2023-03-22 RX ADMIN — KETOROLAC TROMETHAMINE 30 MG: 30 INJECTION, SOLUTION INTRAMUSCULAR; INTRAVENOUS at 10:01

## 2023-03-22 ASSESSMENT — PAIN DESCRIPTION - DESCRIPTORS
DESCRIPTORS: PRESSURE;ACHING
DESCRIPTORS: DISCOMFORT
DESCRIPTORS: DISCOMFORT

## 2023-03-22 ASSESSMENT — LIFESTYLE VARIABLES
HOW OFTEN DO YOU HAVE A DRINK CONTAINING ALCOHOL: NEVER
HOW MANY STANDARD DRINKS CONTAINING ALCOHOL DO YOU HAVE ON A TYPICAL DAY: PATIENT DOES NOT DRINK
SMOKING_STATUS: 0

## 2023-03-22 ASSESSMENT — PAIN DESCRIPTION - ORIENTATION: ORIENTATION: MID

## 2023-03-22 ASSESSMENT — PAIN SCALES - GENERAL
PAINLEVEL_OUTOF10: 2
PAINLEVEL_OUTOF10: 5

## 2023-03-22 ASSESSMENT — PAIN DESCRIPTION - LOCATION
LOCATION: ABDOMEN
LOCATION: ABDOMEN

## 2023-03-22 ASSESSMENT — PAIN - FUNCTIONAL ASSESSMENT
PAIN_FUNCTIONAL_ASSESSMENT: ACTIVITIES ARE NOT PREVENTED
PAIN_FUNCTIONAL_ASSESSMENT: 0-10

## 2023-03-22 NOTE — OP NOTE
hepatic flexure. Lateral mobilization of the colon was then done also using a combination of blunt dissection and the vessel sealer along the [employer]. The  terminal ileum was mobilized on its mesentery as well. The lesser sac was then opened with the vessel sealer and the transverse colon was released from the stomach by ligating the short gastric vessels. The colon was then dissected off of the retroperitoneum. The duodenum was identified and protected from injury. There was minimal blood loss during this portion of the procedure. Once the colon was adequately mobilized, the colon was grasped with a laparoscopic grasper through the most cranial port. The robot was then undocked and I scrubbed back into the procedure. Using a 15 blade scalpel, a 5 cm midline incision was made in the supraumbilical position. The incision was carried down through the subcutaneous tissue with electrocautery. The linea alba was opened with cautery and the incision was enlarged. A gel port wound protector was then placed through this incision. The colon was delivered through the port. The colon was transected proximally and distally with two BLANCHE-75 staplers. Any remaining intervening mesentery was ligated. The specimen was then removed from the field and submitted to pathology. A side to side functional end to end extracorporeal anastomosis was made using a BLANCHE-75 mm stapler and a TIA-60 mm stapler. A 4-0 silk suture was used to reapproximate the angle of the anastomosis. Hemostasis was confirmed. The bowel anastomosis was returned to the abdomen. The gel port was placed over the wound protector and the abdomen was reinsufflated. The abdominal cavity was inspected with the laparoscope. The abdominal cavity was irrigated and hemostasis was confirmed. The small bowel and colon were inspected and laid into their anatomic locations. All ports were removed and the abdomen was desufflated.  The 5 cm fascial incision was closed with two running 0 PDS sutures. All skin incisions were closed with 4-0 Monocryl subcutaneous sutures. Skin glue was placed over all of the incisions as well. Instrument, sponge, and needle counts were correct prior to abdominal closure and at the conclusion of the case. The patient was transferred to the PACU in stable condition. Wyatt Arias MD, MD, FACS 3/22/2023 11:16 AM     Send copy of H&P to PCP, Amalia Chavez DO and referring physician, No ref.  provider found

## 2023-03-22 NOTE — CONSULTS
EGD BIOPSY performed by Ketan Vázquez MD at [facility] ENDOSCOPY       Medications Prior to Admission:    Medications Prior to Admission: Zinc 100 MG TABS, Take by mouth  hydroCHLOROthiazide (HYDRODIURIL) 12.5 MG tablet, Take 1 tablet by mouth daily  tamsulosin (FLOMAX) 0.4 MG capsule, TAKE 1 CAPSULE TWICE DAILY  sertraline (ZOLOFT) 25 MG tablet, TAKE 1 TABLET NIGHTLY (DOSECHANGE-DEACTIVATED 50MG)  olmesartan (BENICAR) 40 MG tablet, TAKE 1 TABLET DAILY (Patient taking differently: 20 mg TAKE 1 TABLET DAILY)  NONFORMULARY, Alpha lopoic acid  vitamin D3 (CHOLECALCIFEROL) 125 MCG (5000 UT) TABS tablet, Take 0.5 tablets by mouth daily  rosuvastatin (CRESTOR) 40 MG tablet, Take 1 tablet by mouth daily (Patient taking differently: Take 40 mg by mouth at bedtime)  pantoprazole (PROTONIX) 40 MG tablet, Take 1 tablet by mouth daily as needed (heartburn)  Coenzyme Q10 (COQ10) 100 MG CAPS, Take 100 mg by mouth daily  aspirin 81 MG EC tablet, Take 81 mg by mouth daily  clonazePAM (KLONOPIN) 0.5 MG tablet, Take 1 tablet by mouth nightly as needed for Anxiety for up to 180 days. amoxicillin (AMOXIL) 250 MG capsule, Take 250 mg by mouth daily as needed Takes pre-op for dental procedures, h/o bilat hip arthoplasty  sucralfate (CARAFATE) 1 GM tablet, Take 1 g by mouth 4 times daily Takes as needed  magnesium oxide (MAG-OX) 400 MG tablet, Take 400 mg by mouth 2 times daily. Allergies:    Morphine    Social History:    reports that he has quit smoking. His smoking use included cigarettes. He has never used smokeless tobacco. He reports that he does not drink alcohol and does not use drugs. Family History:   Non-contributory to this Urological problem  family history includes Cancer in his father; Diabetes in his mother.     Review of Systems:  Constitutional: No fever or chills   Respiratory: negative for cough and hemoptysis  Cardiovascular: negative for chest pain and dyspnea  Gastrointestinal: negative for abdominal

## 2023-03-22 NOTE — INTERVAL H&P NOTE
Update History & Physical    The patient's History and Physical of March 15, 2023 was reviewed with the patient and I examined the patient. There was no change. The surgical site was confirmed by the patient and me. Plan: The risks, benefits, expected outcome, and alternative to the recommended procedure have been discussed with the patient. Patient understands and wants to proceed with the procedure.      Electronically signed by Donya Mendenhall MD on 3/22/2023 at 7:18 AM

## 2023-03-22 NOTE — ANESTHESIA POSTPROCEDURE EVALUATION
Department of Anesthesiology  Postprocedure Note    Patient: Shane De La Torre  MRN: [id number]  YOB: 1950  Date of evaluation: 3/22/2023      Procedure Summary     Date: 03/22/23 Room / Location: [facility] OR 10 / [facility]    Anesthesia Start: 6939 Anesthesia Stop: 1980    Procedure: LAPAROSCOPIC ROBOTIC XI ASSISTED RIGHT HEMICOLECTOMY (Right: Abdomen) Diagnosis:       Cancer of appendix ([address]      (Malignant neoplasm of colon, unspecified part of colon ([address] [C18.9])    Surgeons: Scott Babb MD Responsible Provider: Lisbeth Capone MD    Anesthesia Type: general ASA Status: 3          Anesthesia Type: No value filed.     Sudha Phase I: Sudha Score: 10    Sudha Phase II:        Anesthesia Post Evaluation    Patient location during evaluation: PACU  Patient participation: complete - patient participated  Level of consciousness: sleepy but conscious  Pain score: 0  Airway patency: patent  Nausea & Vomiting: no nausea and no vomiting  Complications: no  Cardiovascular status: blood pressure returned to baseline and hemodynamically stable  Respiratory status: acceptable  Hydration status: stable

## 2023-03-22 NOTE — PROGRESS NOTES
Brock Beltran with urology notified of consult via answering service.  Dexter Gifford 3/22/2023 1:05 PM

## 2023-03-22 NOTE — ACP (ADVANCE CARE PLANNING)
Advance Care Planning   Healthcare Decision Maker:    Primary Decision Maker: Regi Beckley [facility] - [phone]    Secondary Decision Maker: Jasmyne Fleming - Child - [phone]    Click here to complete Healthcare Decision Makers including selection of the Healthcare Decision Maker Relationship (ie \"Primary\").

## 2023-03-22 NOTE — ANESTHESIA PRE PROCEDURE
L4&5    Ventricular ectopic beats 04/12/2013       Past Surgical History:        Procedure Laterality Date    COLONOSCOPY      COLONOSCOPY N/A 3/15/2023    COLONOSCOPY WITH BIOPSY performed by Sheba Mehta MD at [address]      with stent 2011     DIAGNOSTIC CARDIAC CATH LAB PROCEDURE      ENDOSCOPY, COLON, DIAGNOSTIC      JOINT REPLACEMENT Right 1990, 11/2011    hip x2    JOINT REPLACEMENT Left 1990, 2007    hip x2     LAPAROSCOPIC APPENDECTOMY N/A 2/9/2023    APPENDECTOMY LAPAROSCOPIC performed by Sheba Mehta MD at [address] [facility] N/A 5/22/2020    EGD BIOPSY performed by Sheba Mehta MD at [address] History:    Social History     Tobacco Use    Smoking status: Former     Types: Cigarettes    Smokeless tobacco: Never   Substance Use Topics    Alcohol use: No     Comment: rarely                                Counseling given: Not Answered      Vital Signs (Current):   Vitals:    03/22/23 0650   BP: (!) 152/83   Pulse: 56   Resp: 18   Temp: 36.2 °C (97.1 °F)   TempSrc: Temporal   SpO2: 98%   Weight: 209 lb (94.8 kg)   Height: 6' 1\" (1.854 m)                                              BP Readings from Last 3 Encounters:   03/22/23 (!) 152/83   03/15/23 (!) 142/71   03/09/23 (!) 161/78       NPO Status: Time of last liquid consumption: 2200                        Time of last solid consumption: 1800                        Date of last liquid consumption: 03/21/23                        Date of last solid food consumption: 03/20/23    BMI:   Wt Readings from Last 3 Encounters:   03/22/23 209 lb (94.8 kg)   03/20/23 209 lb 6.4 oz (95 kg)   03/13/23 202 lb (91.6 kg)     Body mass index is 27.57 kg/m².     CBC:   Lab Results   Component Value Date/Time    WBC 6.4 03/20/2023 01:15 PM    RBC 4.97 03/20/2023 01:15 PM    RBC 4.84 10/11/2022 09:16 AM    HGB 14.1 03/20/2023 01:15 PM

## 2023-03-23 LAB
ANION GAP SERPL CALCULATED.3IONS-SCNC: 11 MMOL/L (ref 7–16)
BASOPHILS # BLD: 0.01 E9/L (ref 0–0.2)
BASOPHILS NFR BLD: 0.1 % (ref 0–2)
BUN SERPL-MCNC: 22 MG/DL (ref 6–23)
CALCIUM SERPL-MCNC: 8.4 MG/DL (ref 8.6–10.2)
CHLORIDE SERPL-SCNC: 107 MMOL/L (ref 98–107)
CO2 SERPL-SCNC: 21 MMOL/L (ref 22–29)
CREAT SERPL-MCNC: 1 MG/DL (ref 0.7–1.2)
EOSINOPHIL # BLD: 0 E9/L (ref 0.05–0.5)
EOSINOPHIL NFR BLD: 0 % (ref 0–6)
ERYTHROCYTE [DISTWIDTH] IN BLOOD BY AUTOMATED COUNT: 13.8 FL (ref 11.5–15)
GLUCOSE SERPL-MCNC: 113 MG/DL (ref 74–99)
HCT VFR BLD AUTO: 37.9 % (ref 37–54)
HGB BLD-MCNC: 12.2 G/DL (ref 12.5–16.5)
IMM GRANULOCYTES # BLD: 0.04 E9/L
IMM GRANULOCYTES NFR BLD: 0.5 % (ref 0–5)
LYMPHOCYTES # BLD: 0.75 E9/L (ref 1.5–4)
LYMPHOCYTES NFR BLD: 8.5 % (ref 20–42)
MCH RBC QN AUTO: 28.8 PG (ref 26–35)
MCHC RBC AUTO-ENTMCNC: 32.2 % (ref 32–34.5)
MCV RBC AUTO: 89.6 FL (ref 80–99.9)
MONOCYTES # BLD: 0.72 E9/L (ref 0.1–0.95)
MONOCYTES NFR BLD: 8.2 % (ref 2–12)
NEUTROPHILS # BLD: 7.26 E9/L (ref 1.8–7.3)
NEUTS SEG NFR BLD: 82.7 % (ref 43–80)
PLATELET # BLD AUTO: 123 E9/L (ref 130–450)
PMV BLD AUTO: 11.2 FL (ref 7–12)
POTASSIUM SERPL-SCNC: 3.9 MMOL/L (ref 3.5–5)
RBC # BLD AUTO: 4.23 E12/L (ref 3.8–5.8)
SODIUM SERPL-SCNC: 139 MMOL/L (ref 132–146)
WBC # BLD: 8.8 E9/L (ref 4.5–11.5)

## 2023-03-23 PROCEDURE — 97161 PT EVAL LOW COMPLEX 20 MIN: CPT

## 2023-03-23 PROCEDURE — 97165 OT EVAL LOW COMPLEX 30 MIN: CPT

## 2023-03-23 PROCEDURE — 6370000000 HC RX 637 (ALT 250 FOR IP): Performed by: STUDENT IN AN ORGANIZED HEALTH CARE EDUCATION/TRAINING PROGRAM

## 2023-03-23 PROCEDURE — 6360000002 HC RX W HCPCS: Performed by: SURGERY

## 2023-03-23 PROCEDURE — 2580000003 HC RX 258: Performed by: STUDENT IN AN ORGANIZED HEALTH CARE EDUCATION/TRAINING PROGRAM

## 2023-03-23 PROCEDURE — 85025 COMPLETE CBC W/AUTO DIFF WBC: CPT

## 2023-03-23 PROCEDURE — 1200000000 HC SEMI PRIVATE

## 2023-03-23 PROCEDURE — 36415 COLL VENOUS BLD VENIPUNCTURE: CPT

## 2023-03-23 PROCEDURE — 6370000000 HC RX 637 (ALT 250 FOR IP): Performed by: SURGERY

## 2023-03-23 PROCEDURE — 80048 BASIC METABOLIC PNL TOTAL CA: CPT

## 2023-03-23 RX ADMIN — SODIUM CHLORIDE, POTASSIUM CHLORIDE, SODIUM LACTATE AND CALCIUM CHLORIDE: 600; 310; 30; 20 INJECTION, SOLUTION INTRAVENOUS at 05:41

## 2023-03-23 RX ADMIN — TAMSULOSIN HYDROCHLORIDE 0.8 MG: 0.4 CAPSULE ORAL at 08:07

## 2023-03-23 RX ADMIN — ROSUVASTATIN CALCIUM 40 MG: 20 TABLET, FILM COATED ORAL at 21:02

## 2023-03-23 RX ADMIN — ACETAMINOPHEN 650 MG: 325 TABLET ORAL at 05:39

## 2023-03-23 RX ADMIN — OXYCODONE 5 MG: 5 TABLET ORAL at 11:44

## 2023-03-23 RX ADMIN — SERTRALINE HYDROCHLORIDE 25 MG: 50 TABLET ORAL at 08:08

## 2023-03-23 RX ADMIN — ENOXAPARIN SODIUM 40 MG: 100 INJECTION SUBCUTANEOUS at 08:06

## 2023-03-23 RX ADMIN — ACETAMINOPHEN 650 MG: 325 TABLET ORAL at 18:35

## 2023-03-23 RX ADMIN — OXYCODONE 5 MG: 5 TABLET ORAL at 21:10

## 2023-03-23 RX ADMIN — ACETAMINOPHEN 650 MG: 325 TABLET ORAL at 11:44

## 2023-03-23 ASSESSMENT — PAIN DESCRIPTION - DESCRIPTORS
DESCRIPTORS: ACHING;SHARP
DESCRIPTORS: ACHING;DISCOMFORT;SORE;TENDER
DESCRIPTORS: SHARP

## 2023-03-23 ASSESSMENT — PAIN - FUNCTIONAL ASSESSMENT
PAIN_FUNCTIONAL_ASSESSMENT: ACTIVITIES ARE NOT PREVENTED

## 2023-03-23 ASSESSMENT — PAIN DESCRIPTION - ORIENTATION
ORIENTATION: MID
ORIENTATION: OTHER (COMMENT)
ORIENTATION: RIGHT;LEFT
ORIENTATION: MID;INNER

## 2023-03-23 ASSESSMENT — PAIN SCALES - GENERAL
PAINLEVEL_OUTOF10: 5
PAINLEVEL_OUTOF10: 4
PAINLEVEL_OUTOF10: 5

## 2023-03-23 ASSESSMENT — PAIN DESCRIPTION - PAIN TYPE
TYPE: ACUTE PAIN;SURGICAL PAIN
TYPE: SURGICAL PAIN

## 2023-03-23 ASSESSMENT — PAIN DESCRIPTION - LOCATION
LOCATION: ABDOMEN
LOCATION: SHOULDER
LOCATION: ABDOMEN
LOCATION: ABDOMEN

## 2023-03-23 ASSESSMENT — PAIN DESCRIPTION - FREQUENCY
FREQUENCY: INTERMITTENT
FREQUENCY: CONTINUOUS

## 2023-03-23 ASSESSMENT — PAIN DESCRIPTION - ONSET
ONSET: SUDDEN
ONSET: ON-GOING

## 2023-03-23 NOTE — PROGRESS NOTES
GENERAL SURGERY  DAILY PROGRESS NOTE  3/23/2023    Subjective:  No events overnight. Patient is postop day 1 from robotic assisted laparoscopic right hemicolectomy. Abdominal pain is minimal this morning. Patient denies any nausea or vomiting. He has not passed any flatus yet. Creatinine stable 1.0. Hemoglobin at 12.2 from 14.1 yesterday. Objective:  /64   Pulse (!) 43   Temp 97.7 °F (36.5 °C) (Oral)   Resp 16   Ht 6' 1\" (1.854 m)   Wt 209 lb (94.8 kg)   SpO2 98%   BMI 27.57 kg/m²     General appearance: alert, cooperative and in no acute distress. Eyes: grossly normal  Lungs: nonlabored breathing on   Heart: regular rate  Abdomen: Soft, nondistended, appropriately tender ko-incisional without rebound/guarding/rigidity. Incisions clean/dry/intact with skin glue in place. Minimal amount of ecchymosis noted around incisions. Skin: No skin abnormalities  Neurologic: Alert and oriented x 3. Grossly normal  Musculoskeletal: No clubbing cyanosis or edema    Assessment/Plan:  67 y.o. male with appendiceal adenocarcinoma/low grade mucinous neoplasm s/p robotic assisted laparoscopic hemicolectomy    Remove Pedraza  Start patient on clear liquid diet  PT/OT  Get patient out of bed and ambulate  As needed pain/nausea control  Continue to monitor abdominal exam  Discussed with Dr. Dallas Castro    Electronically signed by Reese Patel DO on 3/23/2023 at 1:56 PM    I saw and examined the patient. I reviewed the above resident's note. I reviewed all labs, radiology, and all test results listed above. I agree with the assessment and plan as outlined.     Yo Peacock MD  General Surgery DISPLAY PLAN FREE TEXT

## 2023-03-23 NOTE — CARE COORDINATION
ADLs/IADLs  Current functional level: Independent in ADLs/IADLs    PT AM-PAC:   /24  OT AM-PAC:   /24    Family can provide assistance at DC: Yes  Would you like Case Management to discuss the discharge plan with any other family members/significant others, and if so, who? Yes (wife Darlene Milan in room with patient)  Plans to Return to Present Housing: Yes  Other Identified Issues/Barriers to RETURNING to current housing: none  Potential Assistance needed at discharge: N/A            Potential DME:    Patient expects to discharge to: Other (comment)  Plan for transportation at discharge:      Financial    Payor: [place] / Plan: [facility] / Product Type: *No Product type* /     Does insurance require precert for SNF: Yes    Potential assistance Purchasing Medications: No  Meds-to-Beds request: Yes      [facility] [address] [facility] [address] [facility] [address]  Phone: [phone] Fax: [address] [phone] f [phone] [address] [facility] [address]  Phone: [phone] Fax: [address] [place] [address] [phone]  [address] [place] [address]  Phone: [phone] Fax: [phone]      Notes:    Factors facilitating achievement of predicted outcomes: none    Barriers to discharge: none    Additional Case Management Notes: none    The Plan for Transition of Care is related to the following treatment goals of Cancer of appendix ([facility] [C18.1]  S/P right hemicolectomy [B39.16]    IF APPLICABLE: The Patient and/or patient representative Lilibeth Stoddard and his family were provided with a choice of provider and agrees with the discharge plan.  Freedom of choice list with basic dialogue that supports the patient's individualized plan of care/goals and shares the quality data associated with the providers was provided to:     Patient Representative Name:       The Patient and/or Patient Representative Agree with the Discharge Plan?       Karena Villavicencio RN  Case Management Department  Ph: [phone] Fax: [phone]

## 2023-03-23 NOTE — PROGRESS NOTES
[facility] Quality Flow/Interdisciplinary Rounds Progress Note        Quality Flow Rounds held on March 23, 2023    Disciplines Attending:  Bedside Nurse, , , and Nursing Unit Leadership    Nicola Lennon was admitted on 3/22/2023  5:50 AM    Anticipated Discharge Date:  Expected Discharge Date: 03/26/23    Disposition:    Edward Score:  Edward Scale Score: 21    Readmission Risk              Risk of Unplanned Readmission:  17           Discussed patient goal for the day, patient clinical progression, and barriers to discharge.   The following Goal(s) of the Day/Commitment(s) have been identified:   Discharge planning      Zack Hudson RN  March 23, 2023

## 2023-03-23 NOTE — PROGRESS NOTES
Occupational Therapy    OCCUPATIONAL THERAPY INITIAL EVALUATION     [address] [facility] [place], [place]         Date:3/23/2023                                                  Patient Name: Rowdy Mack    MRN: [id number]    : 1950    Room: [address] [place] [address]      Evaluating OT: Yaz East OTR/L   [id number]      Referring Bartolo Pineda MD    Specific Provider Orders/Date:OT eval and treat 3/22/2023      Diagnosis:  Cancer of appendix ([facility] [C18.1]  S/P right hemicolectomy [Z90.49]    Precautions:  Fall Risk,      COMMENTS:   eval only . Patient able to mange own self care. No acute OT needs     Home Living: Pt lives with wife, 1st floor set-up .  2 steps to enter    Bathroom setup: walk in shower     Prior Level of Function: Independent  with ADLs , Independent  with IADLs; ambulated with no device   Driving: yes    Pain Level: abdominal discomfort   Cognition: A&O: 4/4;    Memory:  good    Sequencing:  good    Problem solving:  good    Judgement/safety:  good      Functional Assessment:  AM-PAC Daily Activity Raw Score: 23   Initial Eval Status  Date: 3/23/2023   Feeding Independent    Grooming Independent   Standing at sink brushing teeth    UB Dressing Independent    LB Dressing Supervision   Able to initiate lifting bending leg to reach socks while seated in chair    Bathing Supervision    Toileting Independent    Bed Mobility  Independent  Supine to sit    Functional Transfers Independent   Sit-stand from bed, chair    Functional Mobility Supervision, no device  Ambulating to/from bathroom   No LOB or unsteadiness   Balance Sitting:     Static:  Independent     Dynamic:Independent   Standing: independent    Activity Tolerance No SOB    Visual/  Perceptual Glasses: none by bedside            Hand Dominance right   AROM (PROM) Strength Additional Info:    RUE  WFL WFL good  and wfl FMC/dexterity noted during ADL tasks       BENITO

## 2023-03-23 NOTE — PROGRESS NOTES
Physical Therapy  Facility/Department: [facility] MED SURG  Physical Therapy Initial Assessment    Name: Clyde Talamantes  : 1950  MRN: [id number]  Date of Service: 3/23/2023             Patient Diagnosis(es): The encounter diagnosis was Cancer of appendix [facility]). Past Medical History:  has a past medical history of CAD (coronary artery disease), Cancer ([address] Enlarged prostate, GERD (gastroesophageal reflux disease), Hyperlipidemia, Hypertension, Insomnia, PONV (postoperative nausea and vomiting), Spondylosis, and Ventricular ectopic beats. Past Surgical History:  has a past surgical history that includes Colonoscopy; Tonsillectomy; Diagnostic Cardiac Cath Lab Procedure; Endoscopy, colon, diagnostic; joint replacement (Right, , 2011); joint replacement (Left, , ); Coronary angioplasty; Upper gastrointestinal endoscopy (N/A, 2020); laparoscopic appendectomy (N/A, 2023); Colonoscopy (N/A, 3/15/2023); and colectomy (Right, 3/22/2023). 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 Prior to admission pt walked with  no AD, independent      Initial Evaluation  Date:  3/23/2023    Was pt agreeable to Eval/treatment? Yes    Does pt have pain?   Abd and upper chest/shoulders    Bed Mobility  Rolling:  NT   Supine to sit:  NT   Sit to supine:  NT   Scooting:  independent in sit    Transfers Sit to stand:  independent   Stand to sit:  independent   Stand pivot:  NT    Ambulation     350  feet with  no AD  with  independent        Stair negotiation: ascended and descended NT   LE ROM WFL   LE strength  WFL    AM- PAC RAW score   24/24          Pt is alert and Oriented x 4      Balance: independent . Bed/Chair alarm: no      ASSESSMENT  Pt displays functional ability as noted in the objective portion of this evaluation. Treatment/Education:    Pt in chair  upon arrival ; agreeable to PT. Mobility as above. Pt reports he has been independent with bed mobility and he has been walking in resendiz with wife. Pt encouraged to continue to do so. Pt educated on fall risk,  safety with mobility        Patient response to education:   Pt verbalized understanding Pt demonstrated skill Pt requires further education in this area   x x        Comments:  Pt left  in chair after session, with call light in reach. PLAN  No skilled inpatient PT recommended         Time in: 1351   Time out:  1402      Evaluation Time includes thorough review of current medical information, gathering information on past medical history/social history and prior level of function, completion of standardized testing/informal observation of tasks, assessment of data and education on plan of care and goals.     CPT codes:  [x] Low Complexity PT evaluation 37065  [] Moderate Complexity PT evaluation 31241  [] High Complexity PT evaluation 82520  [] PT Re-evaluation 06615  [] Gait training 90134  minutes  [] Therapeutic activities 88294  minutes  [] Therapeutic exercises 76760  minutes  [] Neuromuscular reeducation 58912  minutes       Pearl 18 number:  PT 3996

## 2023-03-23 NOTE — PROGRESS NOTES
Former     Types: Cigarettes    Smokeless tobacco: Never   Vaping Use    Vaping Use: Never used   Substance and Sexual Activity    Alcohol use: No     Comment: rarely    Drug use: Never    Sexual activity: Yes     Partners: Female     Social Determinants of Health     Financial Resource Strain: Low Risk     Difficulty of Paying Living Expenses: Not hard at all   Food Insecurity: Food Insecurity Present    Worried About [address] in the Last Year: Often true    Ran Out of Food in the Last Year: Often true   Transportation Needs: Unknown    Lack of Transportation (Non-Medical): No   Housing Stability: Unknown    Unstable Housing in the Last Year: No       IV:    sodium chloride      lactated ringers IV soln 125 mL/hr at 03/23/23 0541       PRN: clonazePAM, pantoprazole, labetalol, sodium chloride flush, sodium chloride, ondansetron **OR** ondansetron, oxyCODONE **OR** oxyCODONE, HYDROmorphone, hydrALAZINE    Scheduled:    rosuvastatin  40 mg Oral Daily    sertraline  25 mg Oral Daily    sucralfate  1 g Oral 4x Daily    sodium chloride flush  10 mL IntraVENous 2 times per day    acetaminophen  650 mg Oral Q6H    tamsulosin  0.8 mg Oral Daily    enoxaparin  40 mg SubCUTAneous Daily    finasteride  5 mg Oral Daily       Lab Results   Component Value Date/Time     03/23/2023 03:49 AM    K 3.9 03/23/2023 03:49 AM    BUN 22 03/23/2023 03:49 AM    CREATININE 1.0 03/23/2023 03:49 AM        Lab Results   Component Value Date/Time    HGB 12.2 03/23/2023 03:49 AM    HCT 37.9 03/23/2023 03:49 AM       Lab Results   Component Value Date    PSA 3.1 11/09/2022    PSA 4.60 (H) 05/09/2022    PSA 3.77 04/21/2021       Review Of Systems:  Constitutional: Sore  Eyes: negative  Ears, nose, mouth, throat, and face: negative  Respiratory: negative  Cardiovascular: CAD  Gastrointestinal: Appendiceal carcinoid  Genitourinary: BPH  Musculoskeletal: negative  Neurological: negative  Behavioral/Psych: negative  Endocrine: negative    Physical Exam:  Skin is dry, and without rashes  Respirations are non-labored, intact  Abdomen is soft, non-tender, non-distended. Active bowel sounds are present. No rebound or guarding. Abdominal incisions are clean, dry, intact  Alert and oriented x 3. No focal motor/sensory deficits  Pedraza catheter is draining clear, yellow urine    Assessment and Plan:  Urinary retention/BPH/Elevated PSA  -Continue Flomax and Proscar as prescribed  -Voiding trial just prior to discharge. -If he fails a voiding trial, the catheter will be reinserted. We discussed a TURP procedure in detail along with potential referral to the [facility] for a HoLEP or robotic suprapubic prostatectomy. His prostate is too large for a greenlight laser, Rezum, or UroLift procedure  -PSA is elevated but stable.   Continue to monitor  -We will follow him during his hospital stay    Hyun Brock MD  3/23/2023  6:59 AM

## 2023-03-24 LAB
ANION GAP SERPL CALCULATED.3IONS-SCNC: 9 MMOL/L (ref 7–16)
BASOPHILS # BLD: 0.01 E9/L (ref 0–0.2)
BASOPHILS NFR BLD: 0.1 % (ref 0–2)
BUN SERPL-MCNC: 21 MG/DL (ref 6–23)
CALCIUM SERPL-MCNC: 8.7 MG/DL (ref 8.6–10.2)
CHLORIDE SERPL-SCNC: 105 MMOL/L (ref 98–107)
CO2 SERPL-SCNC: 25 MMOL/L (ref 22–29)
CREAT SERPL-MCNC: 1.1 MG/DL (ref 0.7–1.2)
EOSINOPHIL # BLD: 0.06 E9/L (ref 0.05–0.5)
EOSINOPHIL NFR BLD: 0.7 % (ref 0–6)
ERYTHROCYTE [DISTWIDTH] IN BLOOD BY AUTOMATED COUNT: 14.1 FL (ref 11.5–15)
GLUCOSE SERPL-MCNC: 91 MG/DL (ref 74–99)
HCT VFR BLD AUTO: 37.6 % (ref 37–54)
HGB BLD-MCNC: 12.2 G/DL (ref 12.5–16.5)
IMM GRANULOCYTES # BLD: 0.03 E9/L
IMM GRANULOCYTES NFR BLD: 0.4 % (ref 0–5)
LYMPHOCYTES # BLD: 1.78 E9/L (ref 1.5–4)
LYMPHOCYTES NFR BLD: 21.8 % (ref 20–42)
MCH RBC QN AUTO: 28.8 PG (ref 26–35)
MCHC RBC AUTO-ENTMCNC: 32.4 % (ref 32–34.5)
MCV RBC AUTO: 88.7 FL (ref 80–99.9)
MONOCYTES # BLD: 0.71 E9/L (ref 0.1–0.95)
MONOCYTES NFR BLD: 8.7 % (ref 2–12)
NEUTROPHILS # BLD: 5.56 E9/L (ref 1.8–7.3)
NEUTS SEG NFR BLD: 68.3 % (ref 43–80)
PLATELET # BLD AUTO: 112 E9/L (ref 130–450)
PMV BLD AUTO: 11.4 FL (ref 7–12)
POTASSIUM SERPL-SCNC: 3.6 MMOL/L (ref 3.5–5)
RBC # BLD AUTO: 4.24 E12/L (ref 3.8–5.8)
SODIUM SERPL-SCNC: 139 MMOL/L (ref 132–146)
WBC # BLD: 8.2 E9/L (ref 4.5–11.5)

## 2023-03-24 PROCEDURE — 51702 INSERT TEMP BLADDER CATH: CPT

## 2023-03-24 PROCEDURE — 36415 COLL VENOUS BLD VENIPUNCTURE: CPT

## 2023-03-24 PROCEDURE — 2580000003 HC RX 258: Performed by: SURGERY

## 2023-03-24 PROCEDURE — 6370000000 HC RX 637 (ALT 250 FOR IP): Performed by: STUDENT IN AN ORGANIZED HEALTH CARE EDUCATION/TRAINING PROGRAM

## 2023-03-24 PROCEDURE — 85025 COMPLETE CBC W/AUTO DIFF WBC: CPT

## 2023-03-24 PROCEDURE — 6360000002 HC RX W HCPCS: Performed by: SURGERY

## 2023-03-24 PROCEDURE — 6370000000 HC RX 637 (ALT 250 FOR IP)

## 2023-03-24 PROCEDURE — 1200000000 HC SEMI PRIVATE

## 2023-03-24 PROCEDURE — 51798 US URINE CAPACITY MEASURE: CPT

## 2023-03-24 PROCEDURE — 2580000003 HC RX 258: Performed by: STUDENT IN AN ORGANIZED HEALTH CARE EDUCATION/TRAINING PROGRAM

## 2023-03-24 PROCEDURE — 6370000000 HC RX 637 (ALT 250 FOR IP): Performed by: SURGERY

## 2023-03-24 PROCEDURE — 80048 BASIC METABOLIC PNL TOTAL CA: CPT

## 2023-03-24 RX ORDER — ASPIRIN 81 MG/1
81 TABLET ORAL DAILY
Status: DISCONTINUED | OUTPATIENT
Start: 2023-03-24 | End: 2023-03-25 | Stop reason: HOSPADM

## 2023-03-24 RX ORDER — HYDROCHLOROTHIAZIDE 12.5 MG/1
12.5 TABLET ORAL DAILY
Status: DISCONTINUED | OUTPATIENT
Start: 2023-03-24 | End: 2023-03-25 | Stop reason: HOSPADM

## 2023-03-24 RX ORDER — POTASSIUM CHLORIDE 20 MEQ/1
40 TABLET, EXTENDED RELEASE ORAL ONCE
Status: COMPLETED | OUTPATIENT
Start: 2023-03-24 | End: 2023-03-24

## 2023-03-24 RX ADMIN — HYDROCHLOROTHIAZIDE 12.5 MG: 12.5 TABLET ORAL at 15:05

## 2023-03-24 RX ADMIN — ENOXAPARIN SODIUM 40 MG: 100 INJECTION SUBCUTANEOUS at 08:38

## 2023-03-24 RX ADMIN — ACETAMINOPHEN 650 MG: 325 TABLET ORAL at 08:38

## 2023-03-24 RX ADMIN — ASPIRIN 81 MG: 81 TABLET, COATED ORAL at 20:48

## 2023-03-24 RX ADMIN — ACETAMINOPHEN 650 MG: 325 TABLET ORAL at 02:18

## 2023-03-24 RX ADMIN — ACETAMINOPHEN 650 MG: 325 TABLET ORAL at 15:05

## 2023-03-24 RX ADMIN — SODIUM CHLORIDE, PRESERVATIVE FREE 10 ML: 5 INJECTION INTRAVENOUS at 20:49

## 2023-03-24 RX ADMIN — SUCRALFATE 1 G: 1 TABLET ORAL at 20:48

## 2023-03-24 RX ADMIN — HYDRALAZINE HYDROCHLORIDE 10 MG: 20 INJECTION INTRAMUSCULAR; INTRAVENOUS at 02:21

## 2023-03-24 RX ADMIN — SODIUM CHLORIDE, POTASSIUM CHLORIDE, SODIUM LACTATE AND CALCIUM CHLORIDE: 600; 310; 30; 20 INJECTION, SOLUTION INTRAVENOUS at 05:03

## 2023-03-24 RX ADMIN — SUCRALFATE 1 G: 1 TABLET ORAL at 15:06

## 2023-03-24 RX ADMIN — ACETAMINOPHEN 650 MG: 325 TABLET ORAL at 20:48

## 2023-03-24 RX ADMIN — SERTRALINE HYDROCHLORIDE 25 MG: 50 TABLET ORAL at 08:38

## 2023-03-24 RX ADMIN — ROSUVASTATIN CALCIUM 40 MG: 20 TABLET, FILM COATED ORAL at 20:48

## 2023-03-24 RX ADMIN — POTASSIUM CHLORIDE 40 MEQ: 1500 TABLET, EXTENDED RELEASE ORAL at 06:22

## 2023-03-24 RX ADMIN — TAMSULOSIN HYDROCHLORIDE 0.8 MG: 0.4 CAPSULE ORAL at 08:39

## 2023-03-24 ASSESSMENT — PAIN SCALES - GENERAL
PAINLEVEL_OUTOF10: 5
PAINLEVEL_OUTOF10: 4

## 2023-03-24 ASSESSMENT — PAIN DESCRIPTION - ONSET: ONSET: GRADUAL

## 2023-03-24 ASSESSMENT — PAIN DESCRIPTION - ORIENTATION
ORIENTATION: MID
ORIENTATION: MID
ORIENTATION: MID;INNER
ORIENTATION: MID;INNER

## 2023-03-24 ASSESSMENT — PAIN DESCRIPTION - DESCRIPTORS
DESCRIPTORS: ACHING;SORE
DESCRIPTORS: ACHING;DISCOMFORT;SORE;TENDER
DESCRIPTORS: ACHING;SORE
DESCRIPTORS: DISCOMFORT;SORE;TENDER

## 2023-03-24 ASSESSMENT — PAIN DESCRIPTION - LOCATION
LOCATION: ABDOMEN

## 2023-03-24 ASSESSMENT — PAIN - FUNCTIONAL ASSESSMENT
PAIN_FUNCTIONAL_ASSESSMENT: ACTIVITIES ARE NOT PREVENTED

## 2023-03-24 ASSESSMENT — PAIN DESCRIPTION - PAIN TYPE: TYPE: ACUTE PAIN;SURGICAL PAIN

## 2023-03-24 ASSESSMENT — PAIN DESCRIPTION - FREQUENCY: FREQUENCY: INTERMITTENT

## 2023-03-24 NOTE — CARE COORDINATION
Updated plan of care. POD#2 right hemicolectomy. Reinserted rdz cath. Awaiting gas and return of bowel function. Clears, iv fluids. Pt up in room and ambulating.  Plan remains home with no needs-mjo

## 2023-03-24 NOTE — PROGRESS NOTES
Pt up ambulating throughout his room and the hallways this evening and into the middle of the night. Pt having a hard time sleeping d/t voiding throughout the night. Pt voided 200 out and I pvr bladder scanned the pt and he had 517 left. Per nursing order RN to reinsert rdz.

## 2023-03-24 NOTE — PROGRESS NOTES
3/24/2023 10:46 AM  Service: Urology  Group: [facility] (Raul/Nell/Nasreen)    Ulises Powell  [id number]    Subjective:  Pt up in the chair  He is comfortable  Not passing gas  He did void but PVR was > 300cc and rdz was reinserted     Review of Systems  Constitutional: No chills or sweats   Respiratory: negative for cough and shortness of breath  Cardiovascular: negative for chest pain  Gastrointestinal:  not passing gas or having BM   Dermatologic: negative   Hematologic/lymphatic: negative  Musculoskeletal:negative  Neurological: negative for seizures and tremors  Endocrine: negative  Psychiatric: negative    Scheduled Meds:   rosuvastatin  40 mg Oral Daily    sertraline  25 mg Oral Daily    sucralfate  1 g Oral 4x Daily    sodium chloride flush  10 mL IntraVENous 2 times per day    acetaminophen  650 mg Oral Q6H    tamsulosin  0.8 mg Oral Daily    enoxaparin  40 mg SubCUTAneous Daily    finasteride  5 mg Oral Daily       Objective:  Vitals:    03/24/23 0800   BP: (!) 148/60   Pulse: (!) 42   Resp: 16   Temp: 98.4 °F (36.9 °C)   SpO2: 96%         Allergies: Morphine    General Appearance: alert and oriented to person, place and time and in no acute distress  Skin: no rash or erythema  Head: normocephalic and atraumatic  Pulmonary/Chest: normal air movement, no respiratory distress  Abdomen: soft, non-tender, non-distended  Genitalia: rdz catheter in place and draining yellow urine           Labs:     Recent Labs     03/24/23  0230      K 3.6      CO2 25   BUN 21   CREATININE 1.1   GLUCOSE 91   CALCIUM 8.7       Lab Results   Component Value Date/Time    HGB 12.2 03/24/2023 02:30 AM    HCT 37.6 03/24/2023 02:30 AM     Lab Results   Component Value Date     PSA 3.1 11/09/2022     PSA 4.60 (H) 05/09/2022     PSA 3.77 04/21/2021       Assessment/Plan:  Urinary retention/BPH/Elevated PSA  His rdz catheter was removed yesterday and replaced d/t elevated PVR   He did not yet have

## 2023-03-24 NOTE — PROGRESS NOTES
GENERAL SURGERY  DAILY PROGRESS NOTE  3/24/2023    Subjective:  Patient had Pedraza replaced overnight for urinary retention. States he was able to urinate, but was unable to empty his bladder. He denies passing gas or having bowel movement. He is tolerating his clears without nausea or vomiting. Objective:  BP (!) 154/70   Pulse (!) 45   Temp 98 °F (36.7 °C) (Oral)   Resp 16   Ht 6' 1\" (1.854 m)   Wt 209 lb (94.8 kg)   SpO2 96%   BMI 27.57 kg/m²     General appearance: alert, cooperative and in no acute distress. Eyes: grossly normal  Lungs: nonlabored breathing on room air  Heart: Bradycardic, hypertensive. Warm throughout. Abdomen: Soft, nondistended, appropriately tender ko-incisional without rebound/guarding/rigidity. Incisions clean/dry/intact with skin glue in place. Minimal amount of ecchymosis noted around incisions. Skin: No skin abnormalities  Neurologic: Alert and oriented x 3. Grossly normal  Musculoskeletal: No clubbing cyanosis or edema    Assessment/Plan:  67 y.o. male with appendiceal adenocarcinoma/low grade mucinous neoplasm s/p robotic assisted laparoscopic hemicolectomy    Urology consulted for Pedraza management. Appreciate their recommendations. Continue clear liquid diet  PT/OT  Encourage OOB and ambulation  As needed pain/nausea control  Continue to monitor abdominal exam  Replace electrolytes prn  Continue home medications. DVT ppx and SCDs    Electronically signed by Tirso Marion MD on 3/24/2023 at 7:29 AM      I saw and examined the patient. I reviewed the above resident's note. I reviewed all labs, radiology, and all test results listed above. I agree with the assessment and plan as outlined.     Malu Ortiz MD  General Surgery

## 2023-03-24 NOTE — PROGRESS NOTES
P Quality Flow/Interdisciplinary Rounds Progress Note        Quality Flow Rounds held on March 24, 2023    Disciplines Attending:  Bedside Nurse, , , and Nursing Unit Leadership    Praneeth Villa was admitted on 3/22/2023  5:50 AM    Anticipated Discharge Date:  Expected Discharge Date: 03/26/23    Disposition:    Edward Score:  Edward Scale Score: 22    Readmission Risk              Risk of Unplanned Readmission:  15           Discussed patient goal for the day, patient clinical progression, and barriers to discharge.   The following Goal(s) of the Day/Commitment(s) have been identified:   Discharge planning      Nikko Ernst RN  March 24, 2023

## 2023-03-25 VITALS
BODY MASS INDEX: 27.7 KG/M2 | OXYGEN SATURATION: 98 % | TEMPERATURE: 97.5 F | HEART RATE: 62 BPM | HEIGHT: 73 IN | SYSTOLIC BLOOD PRESSURE: 112 MMHG | DIASTOLIC BLOOD PRESSURE: 56 MMHG | RESPIRATION RATE: 16 BRPM | WEIGHT: 209 LBS

## 2023-03-25 LAB
ANION GAP SERPL CALCULATED.3IONS-SCNC: 9 MMOL/L (ref 7–16)
BASOPHILS # BLD: 0.03 E9/L (ref 0–0.2)
BASOPHILS NFR BLD: 0.5 % (ref 0–2)
BUN SERPL-MCNC: 13 MG/DL (ref 6–23)
CALCIUM SERPL-MCNC: 8.8 MG/DL (ref 8.6–10.2)
CHLORIDE SERPL-SCNC: 106 MMOL/L (ref 98–107)
CO2 SERPL-SCNC: 24 MMOL/L (ref 22–29)
CREAT SERPL-MCNC: 0.9 MG/DL (ref 0.7–1.2)
EOSINOPHIL # BLD: 0.19 E9/L (ref 0.05–0.5)
EOSINOPHIL NFR BLD: 3 % (ref 0–6)
ERYTHROCYTE [DISTWIDTH] IN BLOOD BY AUTOMATED COUNT: 13.9 FL (ref 11.5–15)
GLUCOSE SERPL-MCNC: 90 MG/DL (ref 74–99)
HCT VFR BLD AUTO: 35.9 % (ref 37–54)
HGB BLD-MCNC: 11.7 G/DL (ref 12.5–16.5)
IMM GRANULOCYTES # BLD: 0.03 E9/L
IMM GRANULOCYTES NFR BLD: 0.5 % (ref 0–5)
LYMPHOCYTES # BLD: 1.5 E9/L (ref 1.5–4)
LYMPHOCYTES NFR BLD: 24 % (ref 20–42)
MCH RBC QN AUTO: 28.6 PG (ref 26–35)
MCHC RBC AUTO-ENTMCNC: 32.6 % (ref 32–34.5)
MCV RBC AUTO: 87.8 FL (ref 80–99.9)
MONOCYTES # BLD: 0.61 E9/L (ref 0.1–0.95)
MONOCYTES NFR BLD: 9.7 % (ref 2–12)
NEUTROPHILS # BLD: 3.9 E9/L (ref 1.8–7.3)
NEUTS SEG NFR BLD: 62.3 % (ref 43–80)
PLATELET # BLD AUTO: 107 E9/L (ref 130–450)
PMV BLD AUTO: 10.5 FL (ref 7–12)
POTASSIUM SERPL-SCNC: 3.8 MMOL/L (ref 3.5–5)
RBC # BLD AUTO: 4.09 E12/L (ref 3.8–5.8)
SODIUM SERPL-SCNC: 139 MMOL/L (ref 132–146)
WBC # BLD: 6.3 E9/L (ref 4.5–11.5)

## 2023-03-25 PROCEDURE — 99024 POSTOP FOLLOW-UP VISIT: CPT | Performed by: SURGERY

## 2023-03-25 PROCEDURE — 51702 INSERT TEMP BLADDER CATH: CPT

## 2023-03-25 PROCEDURE — 6360000002 HC RX W HCPCS: Performed by: SURGERY

## 2023-03-25 PROCEDURE — 6370000000 HC RX 637 (ALT 250 FOR IP): Performed by: UROLOGY

## 2023-03-25 PROCEDURE — 36415 COLL VENOUS BLD VENIPUNCTURE: CPT

## 2023-03-25 PROCEDURE — 85025 COMPLETE CBC W/AUTO DIFF WBC: CPT

## 2023-03-25 PROCEDURE — 2580000003 HC RX 258: Performed by: STUDENT IN AN ORGANIZED HEALTH CARE EDUCATION/TRAINING PROGRAM

## 2023-03-25 PROCEDURE — 6370000000 HC RX 637 (ALT 250 FOR IP): Performed by: STUDENT IN AN ORGANIZED HEALTH CARE EDUCATION/TRAINING PROGRAM

## 2023-03-25 PROCEDURE — 6370000000 HC RX 637 (ALT 250 FOR IP): Performed by: SURGERY

## 2023-03-25 PROCEDURE — 6370000000 HC RX 637 (ALT 250 FOR IP)

## 2023-03-25 PROCEDURE — 80048 BASIC METABOLIC PNL TOTAL CA: CPT

## 2023-03-25 RX ORDER — OXYCODONE HYDROCHLORIDE 5 MG/1
5 TABLET ORAL EVERY 6 HOURS PRN
Qty: 28 TABLET | Refills: 0 | Status: SHIPPED | OUTPATIENT
Start: 2023-03-25 | End: 2023-04-01

## 2023-03-25 RX ADMIN — HYDROCHLOROTHIAZIDE 12.5 MG: 12.5 TABLET ORAL at 08:44

## 2023-03-25 RX ADMIN — SODIUM CHLORIDE, PRESERVATIVE FREE 10 ML: 5 INJECTION INTRAVENOUS at 08:48

## 2023-03-25 RX ADMIN — TAMSULOSIN HYDROCHLORIDE 0.8 MG: 0.4 CAPSULE ORAL at 08:44

## 2023-03-25 RX ADMIN — SUCRALFATE 1 G: 1 TABLET ORAL at 12:51

## 2023-03-25 RX ADMIN — ASPIRIN 81 MG: 81 TABLET, COATED ORAL at 08:44

## 2023-03-25 RX ADMIN — SERTRALINE HYDROCHLORIDE 25 MG: 50 TABLET ORAL at 08:44

## 2023-03-25 RX ADMIN — ACETAMINOPHEN 650 MG: 325 TABLET ORAL at 08:41

## 2023-03-25 RX ADMIN — HYDRALAZINE HYDROCHLORIDE 10 MG: 20 INJECTION INTRAMUSCULAR; INTRAVENOUS at 08:48

## 2023-03-25 RX ADMIN — ACETAMINOPHEN 650 MG: 325 TABLET ORAL at 02:38

## 2023-03-25 RX ADMIN — SUCRALFATE 1 G: 1 TABLET ORAL at 08:44

## 2023-03-25 RX ADMIN — FINASTERIDE 5 MG: 5 TABLET, FILM COATED ORAL at 08:44

## 2023-03-25 RX ADMIN — ENOXAPARIN SODIUM 40 MG: 100 INJECTION SUBCUTANEOUS at 08:41

## 2023-03-25 RX ADMIN — HYDRALAZINE HYDROCHLORIDE 10 MG: 20 INJECTION INTRAMUSCULAR; INTRAVENOUS at 02:41

## 2023-03-25 ASSESSMENT — PAIN SCALES - GENERAL
PAINLEVEL_OUTOF10: 4
PAINLEVEL_OUTOF10: 5

## 2023-03-25 ASSESSMENT — PAIN DESCRIPTION - DESCRIPTORS
DESCRIPTORS: DISCOMFORT
DESCRIPTORS: DISCOMFORT;SORE;TENDER

## 2023-03-25 ASSESSMENT — PAIN - FUNCTIONAL ASSESSMENT
PAIN_FUNCTIONAL_ASSESSMENT: ACTIVITIES ARE NOT PREVENTED
PAIN_FUNCTIONAL_ASSESSMENT: ACTIVITIES ARE NOT PREVENTED

## 2023-03-25 ASSESSMENT — PAIN DESCRIPTION - FREQUENCY: FREQUENCY: INTERMITTENT

## 2023-03-25 ASSESSMENT — PAIN DESCRIPTION - ORIENTATION
ORIENTATION: MID;INNER
ORIENTATION: MID

## 2023-03-25 ASSESSMENT — PAIN DESCRIPTION - ONSET: ONSET: GRADUAL

## 2023-03-25 ASSESSMENT — PAIN DESCRIPTION - PAIN TYPE: TYPE: ACUTE PAIN;SURGICAL PAIN

## 2023-03-25 ASSESSMENT — PAIN DESCRIPTION - LOCATION
LOCATION: ABDOMEN
LOCATION: ABDOMEN

## 2023-03-25 NOTE — PROGRESS NOTES
status: Former     Types: Cigarettes    Smokeless tobacco: Never   Vaping Use    Vaping Use: Never used   Substance and Sexual Activity    Alcohol use: No     Comment: rarely    Drug use: Never    Sexual activity: Yes     Partners: Female     Social Determinants of Health     Financial Resource Strain: Low Risk     Difficulty of Paying Living Expenses: Not hard at all   Food Insecurity: Food Insecurity Present    Worried About [address] Mobile Security in the Last Year: Often true    Ran Out of Food in the Last Year: Often true   Transportation Needs: Unknown    Lack of Transportation (Non-Medical): No   Housing Stability: Unknown    Unstable Housing in the Last Year: No       IV:    sodium chloride      lactated ringers IV soln 75 mL/hr at 03/24/23 0503       PRN: clonazePAM, pantoprazole, labetalol, sodium chloride flush, sodium chloride, ondansetron **OR** ondansetron, oxyCODONE **OR** oxyCODONE, HYDROmorphone, hydrALAZINE    Scheduled:    hydroCHLOROthiazide  12.5 mg Oral Daily    aspirin  81 mg Oral Daily    rosuvastatin  40 mg Oral Daily    sertraline  25 mg Oral Daily    sucralfate  1 g Oral 4x Daily    sodium chloride flush  10 mL IntraVENous 2 times per day    acetaminophen  650 mg Oral Q6H    tamsulosin  0.8 mg Oral Daily    enoxaparin  40 mg SubCUTAneous Daily    finasteride  5 mg Oral Daily       Lab Results   Component Value Date/Time     03/25/2023 02:48 AM    K 3.8 03/25/2023 02:48 AM    BUN 13 03/25/2023 02:48 AM    CREATININE 0.9 03/25/2023 02:48 AM        Lab Results   Component Value Date/Time    HGB 11.7 03/25/2023 02:48 AM    HCT 35.9 03/25/2023 02:48 AM       Lab Results   Component Value Date    PSA 3.1 11/09/2022    PSA 4.60 (H) 05/09/2022    PSA 3.77 04/21/2021       Review Of Systems:  Constitutional: Tired  Eyes: negative  Ears, nose, mouth, throat, and face: negative  Respiratory: negative  Cardiovascular: CAD  Gastrointestinal: Appendiceal carcinoid  Genitourinary: BPH  Musculoskeletal: negative  Neurological: negative  Behavioral/Psych: negative  Endocrine: negative    Physical Exam:  Skin is dry, and without rashes  Respirations are non-labored, intact  Abdomen is soft, non-tender, non-distended. Active bowel sounds are present. No rebound or guarding. Abdominal incisions are clean, dry, intact  Alert and oriented x 3. No focal motor/sensory deficits  Pedraza catheter is draining clear, yellow urine    Assessment and Plan:  Urinary retention/BPH/Elevated PSA  -Continue Flomax and Proscar as prescribed  -We again discussed a TURP procedure in detail along with referral to the [facility] to see Dr. Stacia Allison for a HoLEP or robotic suprapubic prostatectomy. His prostate is too large for a greenlight laser, Rezum, or UroLift procedure  -PSA is elevated but stable.   Continue to monitor  -Stable for discharge from urology standpoint with Pedraza to a leg bag    Rossi Freire MD  3/25/2023  1:06 PM

## 2023-03-25 NOTE — FLOWSHEET NOTE
[facility] Quality Flow/Interdisciplinary Rounds Progress Note        Quality Flow Rounds held on March 25, 2023    Disciplines Attending:  Bedside Nurse and Nursing Unit Leadership    Shane De La Torre was admitted on 3/22/2023  5:50 AM    Anticipated Discharge Date:  Expected Discharge Date: 03/25/23    Disposition: Home     Edward Score:  Edward Scale Score: 20    Readmission Risk              Risk of Unplanned Readmission:  15           Discussed patient goal for the day, patient clinical progression, and barriers to discharge. The following Goal(s) of the Day/Commitment(s) have been identified:   To have a bowel movement to support return of bowel function in aiding to discharge   Barry Nath RN  March 25, 2023

## 2023-03-25 NOTE — PROGRESS NOTES
Dr. Hayes Hugo returned call. Clarification completed.  Pedraza is to stay in for discharge, and follow up in two weeks

## 2023-03-25 NOTE — PROGRESS NOTES
GENERAL SURGERY  DAILY PROGRESS NOTE  3/25/2023    Subjective:  He is tolerating his diet without nausea or vomiting. Had BM. Objective:  BP (!) 165/83   Pulse 62   Temp 97.5 °F (36.4 °C) (Oral)   Resp 16   Ht 6' 1\" (1.854 m)   Wt 209 lb (94.8 kg)   SpO2 98%   BMI 27.57 kg/m²     General appearance: alert, cooperative and in no acute distress. Eyes: grossly normal  Lungs: nonlabored breathing on room air  Heart: Bradycardic, hypertensive. Warm throughout. Abdomen: Soft, nondistended, appropriately tender ko-incisional without rebound/guarding/rigidity. Incisions clean/dry/intact with skin glue in place. Minimal amount of ecchymosis noted around incisions. Skin: No skin abnormalities  Neurologic: Alert and oriented x 3. Grossly normal  Musculoskeletal: No clubbing cyanosis or edema    Assessment/Plan:  67 y.o. male with appendiceal adenocarcinoma/low grade mucinous neoplasm s/p robotic assisted laparoscopic hemicolectomy    Urology consulted for Pedraza management. Appreciate their recommendations. General diet  PT/OT  Encourage OOB and ambulation  As needed pain/nausea control  Continue to monitor abdominal exam  Replace electrolytes prn  Continue home medications. DVT ppx and SCDs    Ok for dc later today pending  if diet tolerated    Electronically signed by Kel Gamble MD on 3/25/2023 at 7:27 AM      I saw and examined the patient. I reviewed the above resident's note. I reviewed all labs, radiology, and all test results listed above. I agree with the assessment and plan as outlined.     Cindy Neves MD  General Surgery

## 2023-03-25 NOTE — PLAN OF CARE
Problem: Discharge Planning  Goal: Discharge to home or other facility with appropriate resources  3/25/2023 0926 by Jena Hastings RN  Outcome: Progressing  3/24/2023 2248 by Gustavo Whitaker RN  Outcome: Progressing     Problem: Pain  Goal: Verbalizes/displays adequate comfort level or baseline comfort level  3/25/2023 0926 by Jena Hastings RN  Outcome: Progressing  3/24/2023 2248 by Gustavo Whitaker RN  Outcome: Progressing     Problem: Safety - Adult  Goal: Free from fall injury  3/25/2023 0926 by Jena Hastings RN  Outcome: Progressing  3/24/2023 2248 by Gustavo Whitaker RN  Outcome: Progressing     Problem: ABCDS Injury Assessment  Goal: Absence of physical injury  3/25/2023 0926 by Jena Hastings RN  Outcome: Progressing  3/24/2023 2248 by Gustavo Whitaker RN  Outcome: Progressing

## 2023-03-28 LAB
ACID FAST STN SPEC QL: NORMAL
MYCOBACTERIUM SPEC CULT: NORMAL

## 2023-04-03 ENCOUNTER — OFFICE VISIT (OUTPATIENT)
Dept: SURGERY | Age: 73
End: 2023-04-03

## 2023-04-03 VITALS
DIASTOLIC BLOOD PRESSURE: 67 MMHG | HEART RATE: 42 BPM | SYSTOLIC BLOOD PRESSURE: 124 MMHG | HEIGHT: 73 IN | BODY MASS INDEX: 26.51 KG/M2 | RESPIRATION RATE: 16 BRPM | WEIGHT: 200 LBS

## 2023-04-03 DIAGNOSIS — Z09 POSTOPERATIVE EXAMINATION: ICD-10-CM

## 2023-04-03 DIAGNOSIS — Z48.89 ENCOUNTER FOR POSTOPERATIVE CARE: ICD-10-CM

## 2023-04-03 DIAGNOSIS — Z09 POSTOP CHECK: Primary | ICD-10-CM

## 2023-04-03 PROCEDURE — 99024 POSTOP FOLLOW-UP VISIT: CPT | Performed by: SURGERY

## 2023-04-03 NOTE — PROGRESS NOTES
Progress Note - Post-op follow up     Patient's Name/Date of Birth: Feliberto Espinoza / 1950    Date: 4/3/2023    PCP: Ashley Maldonado DO    Referring Physician:   Marybeth Francois [facility]    Chief Complaint   Patient presents with    Post-Op Check     Right hemicolectomy       Subjective:  Patient presents to the office following surgery. The patient is tolerating a regular diet. Denies n/v/d/c. Pain controlled with pain medication. Patient's medications, allergies, past medical, surgical, social and family histories were reviewed and updated as appropriate. Physical Exam:  /67   Pulse (!) 42   Resp 16   Ht 6' 1\" (1.854 m)   Wt 200 lb (90.7 kg)   BMI 26.39 kg/m²   General Appearance: NAD  Abdomen: soft, non-distended mild incisional tenderness, no rebound or guarding, incision C/D/I    Data Reviewed: Pathology reviewed with patient  Diagnosis:   Right hemicolectomy: Small and large intestine, no evidence of neoplasm. 12 lymph nodes, negative for neoplasm.      Assessment/Plan:    67y.o. year old male s/p robotic assisted laparoscopic right hemicolectomy    Patient recovering well from surgery  Wound care discussed  Follow up colonoscopy in 1 year     Corrina Peterson MD 4/3/2023 4:40 PM     Send copy of H&P to PCP, Ashley Maldonado DO and referring physician, Marybeth Francois DO

## 2023-04-08 PROBLEM — Z12.11 SPECIAL SCREENING FOR MALIGNANT NEOPLASMS, COLON: Status: RESOLVED | Noted: 2023-03-09 | Resolved: 2023-04-08

## 2023-07-10 DIAGNOSIS — D64.9 ANEMIA, UNSPECIFIED TYPE: ICD-10-CM

## 2023-07-10 LAB
ERYTHROCYTE [DISTWIDTH] IN BLOOD BY AUTOMATED COUNT: 14.6 FL (ref 11.5–15)
HCT VFR BLD AUTO: 31.4 % (ref 37–54)
HGB BLD-MCNC: 9.3 G/DL (ref 12.5–16.5)
MCH RBC QN AUTO: 26 PG (ref 26–35)
MCHC RBC AUTO-ENTMCNC: 29.6 % (ref 32–34.5)
MCV RBC AUTO: 87.7 FL (ref 80–99.9)
PLATELET # BLD AUTO: 141 E9/L (ref 130–450)
PMV BLD AUTO: 11.5 FL (ref 7–12)
RBC # BLD AUTO: 3.58 E12/L (ref 3.8–5.8)
WBC # BLD: 6.7 E9/L (ref 4.5–11.5)

## 2024-02-05 ENCOUNTER — OFFICE VISIT (OUTPATIENT)
Dept: SURGERY | Age: 74
End: 2024-02-05
Payer: MEDICARE

## 2024-02-05 ENCOUNTER — TELEPHONE (OUTPATIENT)
Dept: SURGERY | Age: 74
End: 2024-02-05

## 2024-02-05 VITALS
TEMPERATURE: 97.7 F | WEIGHT: 216.2 LBS | DIASTOLIC BLOOD PRESSURE: 78 MMHG | HEIGHT: 72 IN | BODY MASS INDEX: 29.28 KG/M2 | HEART RATE: 51 BPM | OXYGEN SATURATION: 98 % | SYSTOLIC BLOOD PRESSURE: 162 MMHG

## 2024-02-05 DIAGNOSIS — C18.1 CANCER OF APPENDIX (HCC): Primary | ICD-10-CM

## 2024-02-05 PROBLEM — K59.00 CONSTIPATION: Status: ACTIVE | Noted: 2024-02-05

## 2024-02-05 PROCEDURE — 3078F DIAST BP <80 MM HG: CPT | Performed by: SURGERY

## 2024-02-05 PROCEDURE — 99213 OFFICE O/P EST LOW 20 MIN: CPT | Performed by: SURGERY

## 2024-02-05 PROCEDURE — 3077F SYST BP >= 140 MM HG: CPT | Performed by: SURGERY

## 2024-02-05 PROCEDURE — 1123F ACP DISCUSS/DSCN MKR DOCD: CPT | Performed by: SURGERY

## 2024-02-05 RX ORDER — SODIUM CHLORIDE 9 MG/ML
INJECTION, SOLUTION INTRAVENOUS CONTINUOUS
OUTPATIENT
Start: 2024-02-05

## 2024-02-05 NOTE — PROGRESS NOTES
General Surgery History and Physical    Patient's Name/Date of Birth: Edilberto Hillman / 1950    Date: 2/5/2024    PCP: Meli Pittman APRN - CNP    Referring Physician:   Meli Pittman APRN *  [phone]    CHIEF COMPLAINT:    Chief Complaint   Patient presents with    Follow-up    Colonoscopy     Colon recall 1 year- LLQ pain since lap javier         HISTORY OF PRESENT ILLNESS:    Edilberto Hillman is an 73 y.o. male who presents for a colonoscopy. The patient said he has had constipation and left sided pain. This has been going on for some time now. After his colon surgery last year he had prostate surgery at [facility] and had pulmonary edema issues following surgery. No nausea, vomiting, diarrhea. No changes in stool caliber. No bloody or black stools. No unintentional weight loss. No family history of colon cancer. The patient has a known history of:  appendiceal cancer . He had a CT a few months ago and id getting them every 6 months for surveillance. He saw an oncologist in [place] as well as Dr. Rush.    Past Medical History:   Past Medical History:   Diagnosis Date    CAD (coronary artery disease)     with stents - Dr. Martinez yearly     Cancer (HCC)     appendix    Enlarged prostate     GERD (gastroesophageal reflux disease)     Hyperlipidemia     Hypertension     Insomnia     PONV (postoperative nausea and vomiting)     Spondylosis     L4&5    Ventricular ectopic beats 04/12/2013        Past Surgical History:   Past Surgical History:   Procedure Laterality Date    COLECTOMY Right 03/22/2023    LAPAROSCOPIC ROBOTIC XI ASSISTED RIGHT HEMICOLECTOMY performed by Indira Arce MD at [facility] OR    COLONOSCOPY      COLONOSCOPY N/A 03/15/2023    COLONOSCOPY WITH BIOPSY performed by Indira Arce MD at [facility] ENDOSCOPY    CORONARY ANGIOPLASTY      with stent 2011     DIAGNOSTIC CARDIAC CATH LAB PROCEDURE      ENDOSCOPY, COLON, DIAGNOSTIC      JOINT REPLACEMENT Right 1990, 11/2011    hip x2

## 2024-02-05 NOTE — TELEPHONE ENCOUNTER
Prior Authorization Form:      DEMOGRAPHICS:                     Patient Name:  Iza Hillman  Patient :  1950            Insurance:  Payor: [employer] / Plan: [employer] [facility] HMO / Product Type: *No Product type* /   Insurance ID Number:    Payer/Plan Subscr  Sex Relation Sub. Ins. ID Effective Group Num   1. [employer]* IZA HILLMAN 1950 Male Self [id number]                                    [address]         DIAGNOSIS & PROCEDURE:                       Procedure/Operation: COLONOSCOPY           CPT Code: 32478    Diagnosis:  CONSTIPATION    ICD10 Code: K59.00    Location:  [facility]    Surgeon:  XIOMARA CARVALHO    SCHEDULING INFORMATION:                          Date: 3-    Time: 8:00              Anesthesia:  MAC/TIVA                                                       Status:  Outpatient        Special Comments:         Electronically signed by Rosalva Owusu MA on 2024 at 2:46 PM

## 2024-02-05 NOTE — PATIENT INSTRUCTIONS
General Surgery - Dr. Indira Ramachandran MD, FACS    Preoperative Instructions    Please read the following information very carefully. It contains information that is necessary to best prepare you for your upcoming procedure.    Make arrangements for a  to take you to and from your procedure. YOU MUST HAVE SOMEONE DRIVE YOU HOME - this cannot be a taxi or public transportation. You will not be administered anesthesia without someone to go home and be at home with you that day.  Nothing to eat or drink after midnight the night before your procedure.  Follow your bowel prep instructions if you have them for this procedure.    3 days prior to your procedure: Stop taking blood thinners like Coumadin or Plavix or Xarelto.  5 days prior to your procedure: Stop taking Aspirin or Aspirin containing products.   If you cannot stop any of these medications prior to your procedure, please contact our office.    Medications morning of procedure:  Only heart, breathing, blood pressure, and seizure medications are permitted on the morning of your procedure. These medications can be taken with a sip of water.    IF YOU ARE UNABLE TO KEEP THE ABOVE SCHEDULED PROCEDURE, YOU MUST NOTIFY DR. RAMACHANDRAN'S OFFICE [phone]. NOT THE FACILITY.    NO CHEWING GUM OR CHEWING TOBACCO AFTER MIDNIGHT ON DAY OF PROCEDURE.    YOU MUST HAVE TRANSPORTATION TO AND FROM THE FACILITY.    Colonoscopy: Before Your Procedure  What is a colonoscopy?     A colonoscopy is a test that lets a doctor look inside your colon. The doctor uses a thin, lighted tube called a colonoscope to look for problems. These include small growths called polyps, cancer, or bleeding.  During the test, the doctor can take samples of tissue that can be checked for cancer or other problems. This is called a biopsy. The doctor can also take out polyps.  Before the test, you will need to stop eating solid foods. You also will be given instructions on how to clean out

## 2024-02-07 ENCOUNTER — OFFICE VISIT (OUTPATIENT)
Dept: FAMILY MEDICINE CLINIC | Age: 74
End: 2024-02-07
Payer: MEDICARE

## 2024-02-07 VITALS
HEIGHT: 72 IN | TEMPERATURE: 97.2 F | BODY MASS INDEX: 28.44 KG/M2 | SYSTOLIC BLOOD PRESSURE: 138 MMHG | RESPIRATION RATE: 16 BRPM | HEART RATE: 82 BPM | WEIGHT: 210 LBS | OXYGEN SATURATION: 98 % | DIASTOLIC BLOOD PRESSURE: 84 MMHG

## 2024-02-07 DIAGNOSIS — F41.9 ANXIETY AND DEPRESSION: Chronic | ICD-10-CM

## 2024-02-07 DIAGNOSIS — F32.A ANXIETY AND DEPRESSION: Chronic | ICD-10-CM

## 2024-02-07 DIAGNOSIS — C18.1 CANCER OF APPENDIX (HCC): ICD-10-CM

## 2024-02-07 DIAGNOSIS — E55.9 VITAMIN D DEFICIENCY: Chronic | ICD-10-CM

## 2024-02-07 DIAGNOSIS — I10 ESSENTIAL HYPERTENSION: Chronic | ICD-10-CM

## 2024-02-07 DIAGNOSIS — E78.5 DYSLIPIDEMIA: Chronic | ICD-10-CM

## 2024-02-07 DIAGNOSIS — G89.29 ACUTE EXACERBATION OF CHRONIC LOW BACK PAIN: Primary | ICD-10-CM

## 2024-02-07 DIAGNOSIS — R73.03 PREDIABETES: ICD-10-CM

## 2024-02-07 DIAGNOSIS — M54.50 ACUTE EXACERBATION OF CHRONIC LOW BACK PAIN: Primary | ICD-10-CM

## 2024-02-07 DIAGNOSIS — K21.9 GASTROESOPHAGEAL REFLUX DISEASE WITHOUT ESOPHAGITIS: Chronic | ICD-10-CM

## 2024-02-07 PROBLEM — K35.80 ACUTE APPENDICITIS: Status: RESOLVED | Noted: 2023-02-09 | Resolved: 2024-02-07

## 2024-02-07 PROCEDURE — 99214 OFFICE O/P EST MOD 30 MIN: CPT | Performed by: FAMILY MEDICINE

## 2024-02-07 PROCEDURE — 1123F ACP DISCUSS/DSCN MKR DOCD: CPT | Performed by: FAMILY MEDICINE

## 2024-02-07 PROCEDURE — 3079F DIAST BP 80-89 MM HG: CPT | Performed by: FAMILY MEDICINE

## 2024-02-07 PROCEDURE — 3075F SYST BP GE 130 - 139MM HG: CPT | Performed by: FAMILY MEDICINE

## 2024-02-07 RX ORDER — METHYLPREDNISOLONE 4 MG/1
TABLET ORAL
Qty: 1 KIT | Refills: 0 | Status: SHIPPED | OUTPATIENT
Start: 2024-02-07 | End: 2024-02-13

## 2024-02-07 RX ORDER — PANTOPRAZOLE SODIUM 40 MG/1
40 TABLET, DELAYED RELEASE ORAL DAILY PRN
Qty: 90 TABLET | Refills: 1 | Status: SHIPPED | OUTPATIENT
Start: 2024-02-07

## 2024-02-07 RX ORDER — SUCRALFATE 1 G/1
1 TABLET ORAL 4 TIMES DAILY
Qty: 120 TABLET | Refills: 1 | Status: SHIPPED
Start: 2024-02-07 | End: 2024-02-08 | Stop reason: SDUPTHER

## 2024-02-07 RX ORDER — CLONAZEPAM 0.5 MG/1
0.5 TABLET ORAL NIGHTLY PRN
Qty: 30 TABLET | Refills: 0 | Status: SHIPPED | OUTPATIENT
Start: 2024-02-07 | End: 2024-08-05

## 2024-02-07 SDOH — ECONOMIC STABILITY: FOOD INSECURITY: WITHIN THE PAST 12 MONTHS, THE FOOD YOU BOUGHT JUST DIDN'T LAST AND YOU DIDN'T HAVE MONEY TO GET MORE.: NEVER TRUE

## 2024-02-07 SDOH — ECONOMIC STABILITY: HOUSING INSECURITY
IN THE LAST 12 MONTHS, WAS THERE A TIME WHEN YOU DID NOT HAVE A STEADY PLACE TO SLEEP OR SLEPT IN A SHELTER (INCLUDING NOW)?: NO

## 2024-02-07 SDOH — ECONOMIC STABILITY: INCOME INSECURITY: HOW HARD IS IT FOR YOU TO PAY FOR THE VERY BASICS LIKE FOOD, HOUSING, MEDICAL CARE, AND HEATING?: NOT HARD AT ALL

## 2024-02-07 SDOH — ECONOMIC STABILITY: FOOD INSECURITY: WITHIN THE PAST 12 MONTHS, YOU WORRIED THAT YOUR FOOD WOULD RUN OUT BEFORE YOU GOT MONEY TO BUY MORE.: NEVER TRUE

## 2024-02-07 ASSESSMENT — PATIENT HEALTH QUESTIONNAIRE - PHQ9
SUM OF ALL RESPONSES TO PHQ QUESTIONS 1-9: 0
10. IF YOU CHECKED OFF ANY PROBLEMS, HOW DIFFICULT HAVE THESE PROBLEMS MADE IT FOR YOU TO DO YOUR WORK, TAKE CARE OF THINGS AT HOME, OR GET ALONG WITH OTHER PEOPLE: 0
5. POOR APPETITE OR OVEREATING: 0
SUM OF ALL RESPONSES TO PHQ QUESTIONS 1-9: 0
SUM OF ALL RESPONSES TO PHQ9 QUESTIONS 1 & 2: 0
6. FEELING BAD ABOUT YOURSELF - OR THAT YOU ARE A FAILURE OR HAVE LET YOURSELF OR YOUR FAMILY DOWN: 0
1. LITTLE INTEREST OR PLEASURE IN DOING THINGS: 0
8. MOVING OR SPEAKING SO SLOWLY THAT OTHER PEOPLE COULD HAVE NOTICED. OR THE OPPOSITE, BEING SO FIGETY OR RESTLESS THAT YOU HAVE BEEN MOVING AROUND A LOT MORE THAN USUAL: 0
7. TROUBLE CONCENTRATING ON THINGS, SUCH AS READING THE NEWSPAPER OR WATCHING TELEVISION: 0
3. TROUBLE FALLING OR STAYING ASLEEP: 0
SUM OF ALL RESPONSES TO PHQ QUESTIONS 1-9: 0
9. THOUGHTS THAT YOU WOULD BE BETTER OFF DEAD, OR OF HURTING YOURSELF: 0
4. FEELING TIRED OR HAVING LITTLE ENERGY: 0
2. FEELING DOWN, DEPRESSED OR HOPELESS: 0
SUM OF ALL RESPONSES TO PHQ QUESTIONS 1-9: 0

## 2024-02-07 ASSESSMENT — ENCOUNTER SYMPTOMS
COUGH: 0
WHEEZING: 0
SHORTNESS OF BREATH: 0
BACK PAIN: 1

## 2024-02-07 NOTE — PROGRESS NOTES
Edilberto Hillman   Patient is a 73 y.o. year old male who presents with:  Chief Complaint   Patient presents with    Back Pain     Last epidural 3 years ago; back pain bothering him again; wants medrol dose pack     Patient also follows with: Cardiology, urology, chiropractor, ortho (hips), PM-lumbar, GS, oncology    HPI    Hx appendiceal cancer  CT was done and good per pt. Plans repeat in six month interval.     Back pain  Chronic low back problems for which he follows with PM.  Last LINDA series 2022  Reports pain recurred/worsened 3-4 months ago when moving a relative, carrying a box and stepped wrong jolting the back. Seeing chiropractor since then w last appt one week ago. Located at L lower back w radiation down the left leg and to the left groin. Hx b/l hip replacement, saw ortho one month ago and hips look good per pt. No leg weakness. Denies abdominal pain. Admits to urinary incontinence since surgery but has actually gotten better after pelvic floor exercises. Back pain worse with prolonged sitting. No dysuria, hematuria, abdominal pain, GI symptoms of any type. Last CT abd/pelvis 10/2023. Has colonsocopy 3/15/2024. Next CT abd/pelvis 4/2024. Current symptoms very consistent with those he has had d/t the back in the past. Last lumbar imaging three or more years ago. Had been seeing PM through [facility].     Anemia  Had been taking iron, stopped 1/2024 after Hb checked in  and 14.7.     HTN  Current treatment: Hydrochlorothiazide 12.5 mg daily, olmesartan 40mg daily  Recent changes in treatment: none  The patient is known to have history of CAD.  HR chronic bradycardia  BP Readings from Last 3 Encounters:   02/07/24 138/84   02/05/24 (!) 162/78   10/24/23 130/74     Dyslipidemia  Current treatment: rosuvastatin 40 mg daily  Recent changes in treatment: none  Indications for statin therapy include: clinical ASCVD (ACS, hx of MI, stable or unstable angina, hx of coronary or other arterial

## 2024-02-08 DIAGNOSIS — K21.9 GASTROESOPHAGEAL REFLUX DISEASE WITHOUT ESOPHAGITIS: ICD-10-CM

## 2024-02-08 RX ORDER — SUCRALFATE 1 G/1
1 TABLET ORAL 4 TIMES DAILY
Qty: 360 TABLET | Refills: 1 | Status: SHIPPED | OUTPATIENT
Start: 2024-02-08

## 2024-02-08 NOTE — TELEPHONE ENCOUNTER
Requested Prescriptions     Pending Prescriptions Disp Refills    sucralfate (CARAFATE) 1 GM tablet 360 tablet 1     Sig: Take 1 tablet by mouth 4 times daily Takes as needed       Next appt is 5/15/2024  Last appt was 2/7/2024      Needs 90 day supply for insurance to cover

## 2024-02-13 ENCOUNTER — HOSPITAL ENCOUNTER (OUTPATIENT)
Dept: GENERAL RADIOLOGY | Age: 74
Discharge: HOME | End: 2024-02-15
Payer: MEDICARE

## 2024-02-13 ENCOUNTER — HOSPITAL ENCOUNTER (OUTPATIENT)
Age: 74
Discharge: HOME | End: 2024-02-15
Payer: MEDICARE

## 2024-02-13 DIAGNOSIS — G89.29 ACUTE EXACERBATION OF CHRONIC LOW BACK PAIN: ICD-10-CM

## 2024-02-13 DIAGNOSIS — M54.50 ACUTE EXACERBATION OF CHRONIC LOW BACK PAIN: ICD-10-CM

## 2024-02-13 PROCEDURE — 72110 X-RAY EXAM L-2 SPINE 4/>VWS: CPT

## 2024-03-12 RX ORDER — LANOLIN ALCOHOL/MO/W.PET/CERES
400 CREAM (GRAM) TOPICAL DAILY
COMMUNITY

## 2024-03-12 RX ORDER — ASPIRIN 81 MG
TABLET, DELAYED RELEASE (ENTERIC COATED) ORAL DAILY
COMMUNITY

## 2024-03-12 NOTE — PROGRESS NOTES
[facility] PRE-ADMISSION TESTING INSTRUCTIONS    The Preadmission Testing patient is instructed accordingly using the following criteria (check applicable):    ARRIVAL INSTRUCTIONS:  [x] Parking the day of Surgery is located in the Main Entrance lot.  Upon entering the door, make an immediate right to the surgery reception desk    [x] Bring photo ID and insurance card    [] Bring in a copy of Living will or Durable Power of  papers.    [x] Please be sure to arrange for a responsible adult to provide transportation to and from the hospital    [x] Please arrange for a responsible adult to be with you for the 24 hour period post procedure due to having anesthesia    [x] If you awake am of surgery not feeling well or have temperature >100 please call [phone]    GENERAL INSTRUCTIONS:    [x] No solid foods after midnight, may have up to 8oz of water until 4 hours prior to surgery. No gum, no candy, no mints. NPO time:0400       [x] You may brush your teeth, do not swallow any toothpaste    [x] Take medications as instructed     [x] Stop herbal supplements and vitamins 5 days prior to procedure    [x] Follow preop dosing of blood thinners per physician instructions    [] Take 1/2 dose of evening insulin, but no insulin after midnight    [] No oral diabetic medications after midnight    [] If diabetic and have low blood sugar or feel symptomatic, take 1-2oz apple juice only    [] Bring inhalers day of surgery    [] Bring urine specimen day of surgery    [x] Shower or bath with soap, lather and rinse well, AM of Surgery, no lotion, powders or creams to surgical site    [x] Follow bowel prep as instructed per surgeon    [x] No tobacco products within 24 hours of surgery     [x] No alcohol or illegal drug use, marijuana included, within 24 hours of surgery.    [x] Jewelry, body piercing's, eyeglasses, contact lenses and dentures are not permitted into surgery (bring cases)      [x]

## 2024-03-12 NOTE — PROGRESS NOTES
Pt scheduled for procedure 3/15/24.  Hx CAD-PCI/stent 2011, follows with Dr. Martinez yearly.  Last ov 1/10/24.  Denies any recent symptoms of chest discomfort or SOB.  Spoke with Deborah at Dr. Martinez's office to request EKG be faxed to [facility]

## 2024-03-15 ENCOUNTER — ANESTHESIA (OUTPATIENT)
Dept: ENDOSCOPY | Age: 74
End: 2024-03-15
Payer: MEDICARE

## 2024-03-15 ENCOUNTER — ANESTHESIA EVENT (OUTPATIENT)
Dept: ENDOSCOPY | Age: 74
End: 2024-03-15
Payer: MEDICARE

## 2024-03-15 ENCOUNTER — HOSPITAL ENCOUNTER (OUTPATIENT)
Age: 74
Setting detail: OUTPATIENT SURGERY
Discharge: HOME | End: 2024-03-15
Attending: SURGERY | Admitting: SURGERY
Payer: MEDICARE

## 2024-03-15 VITALS
BODY MASS INDEX: 27.09 KG/M2 | OXYGEN SATURATION: 97 % | DIASTOLIC BLOOD PRESSURE: 80 MMHG | HEART RATE: 51 BPM | TEMPERATURE: 97.3 F | RESPIRATION RATE: 16 BRPM | WEIGHT: 200 LBS | SYSTOLIC BLOOD PRESSURE: 129 MMHG | HEIGHT: 72 IN

## 2024-03-15 DIAGNOSIS — K59.00 CONSTIPATION: ICD-10-CM

## 2024-03-15 PROCEDURE — 3700000001 HC ADD 15 MINUTES (ANESTHESIA): Performed by: SURGERY

## 2024-03-15 PROCEDURE — 7100000010 HC PHASE II RECOVERY - FIRST 15 MIN: Performed by: SURGERY

## 2024-03-15 PROCEDURE — 3700000000 HC ANESTHESIA ATTENDED CARE: Performed by: SURGERY

## 2024-03-15 PROCEDURE — 6360000002 HC RX W HCPCS: Performed by: ANESTHESIOLOGY

## 2024-03-15 PROCEDURE — 6360000002 HC RX W HCPCS: Performed by: NURSE ANESTHETIST, CERTIFIED REGISTERED

## 2024-03-15 PROCEDURE — 2709999900 HC NON-CHARGEABLE SUPPLY: Performed by: SURGERY

## 2024-03-15 PROCEDURE — 88305 TISSUE EXAM BY PATHOLOGIST: CPT

## 2024-03-15 PROCEDURE — 7100000011 HC PHASE II RECOVERY - ADDTL 15 MIN: Performed by: SURGERY

## 2024-03-15 PROCEDURE — 3609010300 HC COLONOSCOPY W/BIOPSY SINGLE/MULTIPLE: Performed by: SURGERY

## 2024-03-15 PROCEDURE — 45380 COLONOSCOPY AND BIOPSY: CPT | Performed by: SURGERY

## 2024-03-15 PROCEDURE — 2580000003 HC RX 258: Performed by: NURSE ANESTHETIST, CERTIFIED REGISTERED

## 2024-03-15 RX ORDER — SODIUM CHLORIDE 9 MG/ML
INJECTION, SOLUTION INTRAVENOUS CONTINUOUS PRN
Status: DISCONTINUED | OUTPATIENT
Start: 2024-03-15 | End: 2024-03-15 | Stop reason: SDUPTHER

## 2024-03-15 RX ORDER — ONDANSETRON 2 MG/ML
INJECTION INTRAMUSCULAR; INTRAVENOUS
Status: DISCONTINUED
Start: 2024-03-15 | End: 2024-03-15 | Stop reason: HOSPADM

## 2024-03-15 RX ORDER — SODIUM CHLORIDE 9 MG/ML
INJECTION, SOLUTION INTRAVENOUS CONTINUOUS
Status: DISCONTINUED | OUTPATIENT
Start: 2024-03-15 | End: 2024-03-15 | Stop reason: HOSPADM

## 2024-03-15 RX ORDER — ONDANSETRON 2 MG/ML
4 INJECTION INTRAMUSCULAR; INTRAVENOUS ONCE
Status: COMPLETED | OUTPATIENT
Start: 2024-03-15 | End: 2024-03-15

## 2024-03-15 RX ORDER — PROPOFOL 10 MG/ML
INJECTION, EMULSION INTRAVENOUS PRN
Status: DISCONTINUED | OUTPATIENT
Start: 2024-03-15 | End: 2024-03-15 | Stop reason: SDUPTHER

## 2024-03-15 RX ADMIN — SODIUM CHLORIDE: 9 INJECTION, SOLUTION INTRAVENOUS at 08:14

## 2024-03-15 RX ADMIN — ONDANSETRON 4 MG: 2 INJECTION INTRAMUSCULAR; INTRAVENOUS at 07:12

## 2024-03-15 RX ADMIN — PROPOFOL 100 MG: 10 INJECTION, EMULSION INTRAVENOUS at 08:20

## 2024-03-15 RX ADMIN — PROPOFOL 125 MCG/KG/MIN: 10 INJECTION, EMULSION INTRAVENOUS at 08:21

## 2024-03-15 ASSESSMENT — PAIN - FUNCTIONAL ASSESSMENT
PAIN_FUNCTIONAL_ASSESSMENT: NONE - DENIES PAIN
PAIN_FUNCTIONAL_ASSESSMENT: 0-10
PAIN_FUNCTIONAL_ASSESSMENT: NONE - DENIES PAIN

## 2024-03-15 ASSESSMENT — LIFESTYLE VARIABLES: SMOKING_STATUS: 0

## 2024-03-15 NOTE — H&P
Patient's office history and physical was reviewed.    Patient examined.    There has been no change in the patient's history and physical.      Physician Signature: Electronically signed by Dr. Indira Arce    General Surgery History and Physical     Patient's Name/Date of Birth: Edilberto Hillman / 1950     Date: 3/16/24     PCP: Meli Pittman APRN - CNP     Referring Physician:   Meli Pittman APRN *  [phone]     CHIEF COMPLAINT:         Chief Complaint   Patient presents with    Follow-up    Colonoscopy       Colon recall 1 year- LLQ pain since lap javier            HISTORY OF PRESENT ILLNESS:     Edilberto Hillman is an 73 y.o. male who presents for a colonoscopy. The patient said he has had constipation and left sided pain. This has been going on for some time now. After his colon surgery last year he had prostate surgery at [facility] and had pulmonary edema issues following surgery. No nausea, vomiting, diarrhea. No changes in stool caliber. No bloody or black stools. No unintentional weight loss. No family history of colon cancer. The patient has a known history of:  appendiceal cancer . He had a CT a few months ago and id getting them every 6 months for surveillance. He saw an oncologist in [place] as well as Dr. Rush.     Past Medical History:   Past Medical History        Past Medical History:   Diagnosis Date    CAD (coronary artery disease)       with stents - Dr. Martinez yearly     Cancer (HCC)       appendix    Enlarged prostate      GERD (gastroesophageal reflux disease)      Hyperlipidemia      Hypertension/      Insomnia      PONV (postoperative nausea and vomiting)      Spondylosis       L4&5    Ventricular ectopic beats 04/12/2013            Past Surgical History:   Past Surgical History         Past Surgical History:   Procedure Laterality Date    COLECTOMY Right 03/22/2023     LAPAROSCOPIC ROBOTIC XI ASSISTED RIGHT HEMICOLECTOMY performed by Indira Arce MD at  Former       Types: Cigarettes    Smokeless tobacco: Never   Substance Use Topics    Alcohol use: No       Comment: rarely         Family History:   Family History         Family History   Problem Relation Age of Onset    Diabetes Mother      Cancer Father              REVIEW OF SYSTEMS:    Constitutional: negative  Eyes: negative  Ears, nose, mouth, throat, and face: negative  Respiratory: negative  Cardiovascular: negative  Gastrointestinal: as in HPI  Genitourinary:negative  Integument/breast: negative  Hematologic/lymphatic: negative  Musculoskeletal:negative  Neurological: negative  Allergic/Immunologic: negative     PHYSICAL EXAM   BP (!) 162/78 (Site: Right Upper Arm, Position: Sitting, Cuff Size: Large Adult)   Pulse 51   Temp 97.7 °F (36.5 °C) (Temporal)   Ht 1.829 m (6')   Wt 98.1 kg (216 lb 3.2 oz)   SpO2 98%   BMI 29.32 kg/m²      General appearance: alert, cooperative and in no acute distress.  Eyes: Grossly normal   Lungs: normal work of breathing  Heart: regular rate  Abdomen:  soft, non-tender, non-distended  Skin: No skin abnormalities  Neurologic: Alert and oriented x 3. Grossly normal  Musculoskeletal: No edema.        ASSESSMENT AND PLAN:     Edilberto Hillman is an 73 y.o. male who presents for a colonoscopy with history of perforated appendiceal cancer, constipation     All available labs and pathology reviewed.  All imaging independently reviewed and interpreted.   All provider notes reviewed.  The above was discussed with the patient at length.     I will set the patient up for a colonoscopy, possible biopsy, possible polypectomy.  I explained the risks including but not limited to bleeding, perforation leading to possible surgery, or infection. The benefits, alternatives, and potential complications associated with the above procedure to be performed and transfusions when applicable with the patient/responsible person prior to the procedure.      CT surveillance per oncology

## 2024-03-15 NOTE — DISCHARGE INSTRUCTIONS
[facility] Colonoscopy PROCEDURE DISCHARGE INSTRUCTIONS  You may be drowsy or lightheaded after receiving sedation or anesthesia.    A responsible person should be with you for the next 24 hours.    Please follow the instructions checked below:    DIET INSTRUCTIONS:  [x]Start with light diet and progress to your normal diet as you feel like eating. If you experience nausea or repeated episodes of vomiting which persist beyond 12-24 hours, notify your doctor.  []Other     ACTIVITY INSTRUCTIONS:  [x]Rest today. Increase activity as tolerated    []No heavy lifting or strenuous activity     [x]No driving for today  []Other      MEDICATION INSTRUCTIONS:    []Prescriptions sent with you.  Use as directed.  When taking pain medications, you may experience dizziness or drowsiness.  Do not drink alcohol or drive when taking these medications.  [x]Continue preop medications                               Post-procedure Care   If any tissue was removed:   It will be sent to a lab to be examined. It may take 1-2 weeks for results. The doctor will usually give an initial report after the scope is removed. Other tests may be recommended.   A small amount of bleeding may occur during the first few days after the procedure.     When you return home after the procedure, be sure to follow your doctor's instructions, which may include:   Resume medicines as instructed by your doctor.   Resume normal diet, unless directed otherwise by your doctor.   The sedative will make you drowsy. Avoid driving, operating machinery, or making important decisions for the rest of the day.   Rest for the remainder of the day.     After arriving home, contact your doctor if any of the following occurs:   Bleeding from your rectum, notify your doctor if you pass a teaspoonful of blood or more.   Black, tarry stools   Severe abdominal pain   Hard, swollen abdomen   Signs of infection, including fever or chills   Inability to pass gas or

## 2024-03-15 NOTE — ANESTHESIA PRE PROCEDURE
Department of Anesthesiology  Preprocedure Note       Name:  Edliberto Hillman   Age:  73 y.o.  :  1950                                          MRN:  [id number]         Date:  3/15/2024      Surgeon: Surgeon(s):  Indira Arce MD    Procedure: Procedure(s):  COLONOSCOPY DIAGNOSTIC    Medications prior to admission:   Prior to Admission medications    Medication Sig Start Date End Date Taking? Authorizing Provider   magnesium oxide (MAG-OX) 400 (240 Mg) MG tablet Take 1 tablet by mouth daily   Yes Socorro Brunson MD   Docusate Sodium 100 MG TABS Take by mouth daily   Yes Socorro Brunson MD   sucralfate (CARAFATE) 1 GM tablet Take 1 tablet by mouth 4 times daily Takes as needed 24   Corey Green DO   pantoprazole (PROTONIX) 40 MG tablet Take 1 tablet by mouth daily as needed (heartburn) 24   Corey Green DO   clonazePAM (KLONOPIN) 0.5 MG tablet Take 1 tablet by mouth nightly as needed for Anxiety for up to 180 days. 24  Corey Green DO   hydroCHLOROthiazide 12.5 MG tablet TAKE 1 TABLET DAILY 24   Meli Pittman, APRN - CNP   olmesartan (BENICAR) 40 MG tablet TAKE 1 TABLET DAILY 23   Corey Green DO   sertraline (ZOLOFT) 25 MG tablet TAKE 1 TABLET NIGHTLY 23   Corey Green DO   rosuvastatin (CRESTOR) 40 MG tablet TAKE 1 TABLET AT BEDTIME 23   Corey Green DO   Zinc 100 MG TABS Take by mouth daily    Socorro Brunson MD   NONFORMULARY daily Alpha lipoic acid    Socorro Brunson MD   vitamin D3 (CHOLECALCIFEROL) 125 MCG (5000 UT) TABS tablet Take 0.5 tablets by mouth daily 22   Corey Green DO   Coenzyme Q10 (COQ10) 100 MG CAPS Take 100 mg by mouth daily    Socorro Brunson MD   aspirin 81 MG EC tablet Take 1 tablet by mouth daily    Socorro Brunson MD       Current medications:    No current facility-administered medications for this encounter.       Allergies:  No Known

## 2024-03-15 NOTE — OP NOTE
Operative Note: Colonoscopy    Edilberto Hillman     DATE OF PROCEDURE: 3/15/2024  SURGEON: Dr. INDIRA RAMACHANDRAN MD, M.D.     PREOPERATIVE DIAGNOSES:    History of appendiceal cancer    POSTOPERATIVE DIAGNOSES:  Polyp x 1  Moderate pan diverticulosis    SPECIMENS:  ID Type Source Tests Collected by Time Destination   A : Ileo Anstamosis Polyp Bx Tissue Tissue SURGICAL PATHOLOGY Indira Ramachandran MD 3/15/2024 0826    B : Colon Polyp Bx at 60 Tissue Colon SURGICAL PATHOLOGY Indira Ramachandran MD 3/15/2024 0831         OPERATION:   Colonoscopy to the ileocolic anastomosis with biopsies      ANESTHESIA: LMAC    COMPLICATIONS: None.     BLOOD LOSS: Minimal    Procedure Note:    CONSENT AND INDICATIONS:  This is a 73 y.o. year old male who is having the above issues.  I have discussed with the patient and/or the patient representative the indication, alternatives, and the possible risks and/or complications of the planned procedure and the anesthesia methods. The patient and/or patient representative appear to understand and agree to proceed.    OPERATIONS: Bowel prep was done yesterday until the bowels were clear. The patient was placed on the table and sedated by anesthesia. A rectal exam was performed and no mass was felt. A lubricated scope was passed into the rectum which looked normal.  The scope was passed all the way around through the sigmoid, descending, transverse to the ileocolic anastomosis. The bowel prep was clear. There was a polypoid lesion at the anastomosis which was biopsied. There was also a small sessile polyp at 60 cm removed via forceps polypectomy. The cecum was identified by the appendiceal orifice, ileocecal valve, and light reflex in the RLQ.The scope was then slowly withdrawn, each area was examined again on the way out.  There was moderate pan diverticulosis seen.  The scope was retroflexed in the rectum and it was normal . The patient tolerated the procedure well.     PLAN:

## 2024-03-15 NOTE — ANESTHESIA POSTPROCEDURE EVALUATION
Department of Anesthesiology  Postprocedure Note    Patient: Edilberto Hillman  MRN: [id number]  YOB: 1950  Date of evaluation: 3/15/2024    Procedure Summary       Date: 03/15/24 Room / Location: [address]    Anesthesia Start: 0814 Anesthesia Stop: 0838    Procedure: COLONOSCOPY BIOPSY Diagnosis:       Constipation      (Constipation [K59.00])    Surgeons: Indira Arce MD Responsible Provider: Enrique Greco Jr., MD    Anesthesia Type: Not recorded ASA Status: 3            Anesthesia Type: No value filed.    Sudha Phase I: Sudha Score: 10    Sudha Phase II: Sudha Score: 10    Anesthesia Post Evaluation    Patient location during evaluation: PACU  Patient participation: complete - patient participated  Level of consciousness: awake and alert  Airway patency: patent  Nausea & Vomiting: no vomiting and no nausea  Cardiovascular status: blood pressure returned to baseline  Respiratory status: acceptable  Hydration status: euvolemic  Multimodal analgesia pain management approach  Pain management: adequate    No notable events documented.

## 2024-03-19 LAB — SURGICAL PATHOLOGY REPORT: NORMAL

## 2024-04-18 ENCOUNTER — TELEPHONE (OUTPATIENT)
Dept: FAMILY MEDICINE CLINIC | Age: 74
End: 2024-04-18

## 2024-04-18 NOTE — TELEPHONE ENCOUNTER
Pt called stating over the weekend his BP was elevated  198/98 so he called his cardiologist Dr Martinez. And he was put on the nifedipine  ER 30 mg one daily.  Pt wanted to let Dr Green know this. And asking if Dr Green will refill this medication when needed.  Please advise.  Last seen 2/7/2024  Next appt 5/15/2024

## 2024-04-24 ENCOUNTER — OFFICE VISIT (OUTPATIENT)
Dept: FAMILY MEDICINE CLINIC | Age: 74
End: 2024-04-24
Payer: MEDICARE

## 2024-04-24 VITALS
HEIGHT: 72 IN | DIASTOLIC BLOOD PRESSURE: 64 MMHG | HEART RATE: 78 BPM | SYSTOLIC BLOOD PRESSURE: 128 MMHG | WEIGHT: 209 LBS | OXYGEN SATURATION: 98 % | RESPIRATION RATE: 16 BRPM | TEMPERATURE: 97.3 F | BODY MASS INDEX: 28.31 KG/M2

## 2024-04-24 DIAGNOSIS — F32.A ANXIETY AND DEPRESSION: Chronic | ICD-10-CM

## 2024-04-24 DIAGNOSIS — F41.9 ANXIETY AND DEPRESSION: Chronic | ICD-10-CM

## 2024-04-24 DIAGNOSIS — I10 ESSENTIAL HYPERTENSION: Primary | ICD-10-CM

## 2024-04-24 PROCEDURE — 99214 OFFICE O/P EST MOD 30 MIN: CPT | Performed by: FAMILY MEDICINE

## 2024-04-24 PROCEDURE — 3074F SYST BP LT 130 MM HG: CPT | Performed by: FAMILY MEDICINE

## 2024-04-24 PROCEDURE — 1123F ACP DISCUSS/DSCN MKR DOCD: CPT | Performed by: FAMILY MEDICINE

## 2024-04-24 PROCEDURE — 3078F DIAST BP <80 MM HG: CPT | Performed by: FAMILY MEDICINE

## 2024-04-24 RX ORDER — NIFEDIPINE 30 MG
30 TABLET, EXTENDED RELEASE ORAL DAILY
COMMUNITY
Start: 2024-04-11 | End: 2024-04-24

## 2024-04-24 ASSESSMENT — PATIENT HEALTH QUESTIONNAIRE - PHQ9
2. FEELING DOWN, DEPRESSED OR HOPELESS: NOT AT ALL
SUM OF ALL RESPONSES TO PHQ QUESTIONS 1-9: 0
2. FEELING DOWN, DEPRESSED OR HOPELESS: NOT AT ALL
SUM OF ALL RESPONSES TO PHQ QUESTIONS 1-9: 0
8. MOVING OR SPEAKING SO SLOWLY THAT OTHER PEOPLE COULD HAVE NOTICED. OR THE OPPOSITE, BEING SO FIGETY OR RESTLESS THAT YOU HAVE BEEN MOVING AROUND A LOT MORE THAN USUAL: NOT AT ALL
5. POOR APPETITE OR OVEREATING: NOT AT ALL
3. TROUBLE FALLING OR STAYING ASLEEP: NOT AT ALL
6. FEELING BAD ABOUT YOURSELF - OR THAT YOU ARE A FAILURE OR HAVE LET YOURSELF OR YOUR FAMILY DOWN: NOT AT ALL
4. FEELING TIRED OR HAVING LITTLE ENERGY: NOT AT ALL
SUM OF ALL RESPONSES TO PHQ9 QUESTIONS 1 & 2: 0
SUM OF ALL RESPONSES TO PHQ QUESTIONS 1-9: 0
1. LITTLE INTEREST OR PLEASURE IN DOING THINGS: NOT AT ALL
1. LITTLE INTEREST OR PLEASURE IN DOING THINGS: NOT AT ALL
SUM OF ALL RESPONSES TO PHQ9 QUESTIONS 1 & 2: 0
SUM OF ALL RESPONSES TO PHQ QUESTIONS 1-9: 0
SUM OF ALL RESPONSES TO PHQ QUESTIONS 1-9: 0
10. IF YOU CHECKED OFF ANY PROBLEMS, HOW DIFFICULT HAVE THESE PROBLEMS MADE IT FOR YOU TO DO YOUR WORK, TAKE CARE OF THINGS AT HOME, OR GET ALONG WITH OTHER PEOPLE: NOT DIFFICULT AT ALL
9. THOUGHTS THAT YOU WOULD BE BETTER OFF DEAD, OR OF HURTING YOURSELF: NOT AT ALL
7. TROUBLE CONCENTRATING ON THINGS, SUCH AS READING THE NEWSPAPER OR WATCHING TELEVISION: NOT AT ALL
SUM OF ALL RESPONSES TO PHQ QUESTIONS 1-9: 0

## 2024-04-24 ASSESSMENT — ENCOUNTER SYMPTOMS
COUGH: 0
SHORTNESS OF BREATH: 0
BACK PAIN: 1
WHEEZING: 0

## 2024-04-24 NOTE — PROGRESS NOTES
Chronic back pain     Enlarged prostate     GERD (gastroesophageal reflux disease)     Hearing loss     hearing aids    Hyperlipidemia     Hypertension     Insomnia     PONV (postoperative nausea and vomiting)     Spondylosis     L4&5    Urinary incontinence     Ventricular ectopic beats 04/12/2013       Past Surgical History:   Procedure Laterality Date    APPENDECTOMY  2/2023    COLECTOMY Right 03/22/2023    LAPAROSCOPIC ROBOTIC XI ASSISTED RIGHT HEMICOLECTOMY performed by Indria Arce MD at [facility] OR    COLONOSCOPY      COLONOSCOPY N/A 03/15/2023    COLONOSCOPY WITH BIOPSY performed by Indira Arce MD at [facility] ENDOSCOPY    COLONOSCOPY N/A 3/15/2024    COLONOSCOPY BIOPSY performed by Indira Arce MD at [facility] ENDOSCOPY    CORONARY ANGIOPLASTY      with stent 2011     DIAGNOSTIC CARDIAC CATH LAB PROCEDURE      ENDOSCOPY, COLON, DIAGNOSTIC      JOINT REPLACEMENT Right 1990, 11/2011    hip x2    JOINT REPLACEMENT Left 1990, 2007    hip x2     LAPAROSCOPIC APPENDECTOMY N/A 02/09/2023    APPENDECTOMY LAPAROSCOPIC performed by Indira Arce MD at [facility] OR    PROSTATE SURGERY  06/09/2023    Holep-Laser    SUBTOTAL COLECTOMY  3/2023    TONSILLECTOMY      UPPER GASTROINTESTINAL ENDOSCOPY N/A 05/22/2020    EGD BIOPSY performed by Indira Arce MD at [facility] ENDOSCOPY       No Known Allergies      Family History   Problem Relation Age of Onset    Diabetes Mother     High Blood Pressure Mother     Obesity Mother     Cancer Father     Colon Cancer Father        Social History     Socioeconomic History    Marital status:      Spouse name: None    Number of children: 2    Years of education: None    Highest education level: None   Tobacco Use    Smoking status: Former     Current packs/day: 0.25     Average packs/day: 0.3 packs/day for 1 year (0.3 ttl pk-yrs)     Types: Cigarettes    Smokeless tobacco: Never    Tobacco comments:     During college   Vaping Use    Vaping Use:

## 2024-04-25 DIAGNOSIS — R73.03 PREDIABETES: ICD-10-CM

## 2024-04-25 DIAGNOSIS — E55.9 VITAMIN D DEFICIENCY: Chronic | ICD-10-CM

## 2024-04-25 DIAGNOSIS — M54.50 ACUTE EXACERBATION OF CHRONIC LOW BACK PAIN: ICD-10-CM

## 2024-04-25 DIAGNOSIS — F41.9 ANXIETY AND DEPRESSION: Chronic | ICD-10-CM

## 2024-04-25 DIAGNOSIS — K21.9 GASTROESOPHAGEAL REFLUX DISEASE WITHOUT ESOPHAGITIS: Chronic | ICD-10-CM

## 2024-04-25 DIAGNOSIS — E78.5 DYSLIPIDEMIA: Chronic | ICD-10-CM

## 2024-04-25 DIAGNOSIS — I10 ESSENTIAL HYPERTENSION: Chronic | ICD-10-CM

## 2024-04-25 DIAGNOSIS — C18.1 CANCER OF APPENDIX (HCC): ICD-10-CM

## 2024-04-25 DIAGNOSIS — F32.A ANXIETY AND DEPRESSION: Chronic | ICD-10-CM

## 2024-04-25 DIAGNOSIS — G89.29 ACUTE EXACERBATION OF CHRONIC LOW BACK PAIN: ICD-10-CM

## 2024-04-25 LAB
ALBUMIN: 4 G/DL (ref 3.5–5.2)
ALP BLD-CCNC: 67 U/L (ref 40–129)
ALT SERPL-CCNC: 12 U/L (ref 0–40)
ANION GAP SERPL CALCULATED.3IONS-SCNC: 18 MMOL/L (ref 7–16)
AST SERPL-CCNC: 18 U/L (ref 0–39)
BASOPHILS ABSOLUTE: 0.03 K/UL (ref 0–0.2)
BASOPHILS RELATIVE PERCENT: 0 % (ref 0–2)
BILIRUB SERPL-MCNC: 0.6 MG/DL (ref 0–1.2)
BUN BLDV-MCNC: 21 MG/DL (ref 6–23)
CALCIUM SERPL-MCNC: 9.5 MG/DL (ref 8.6–10.2)
CHLORIDE BLD-SCNC: 107 MMOL/L (ref 98–107)
CHOLESTEROL: 127 MG/DL
CO2: 22 MMOL/L (ref 22–29)
CREAT SERPL-MCNC: 1.1 MG/DL (ref 0.7–1.2)
EOSINOPHILS ABSOLUTE: 0.13 K/UL (ref 0.05–0.5)
EOSINOPHILS RELATIVE PERCENT: 2 % (ref 0–6)
GFR SERPL CREATININE-BSD FRML MDRD: 71 ML/MIN/1.73M2
GLUCOSE BLD-MCNC: 97 MG/DL (ref 74–99)
HBA1C MFR BLD: 5.5 % (ref 4–5.6)
HCT VFR BLD CALC: 43.5 % (ref 37–54)
HDLC SERPL-MCNC: 42 MG/DL
HEMOGLOBIN: 14.1 G/DL (ref 12.5–16.5)
IMMATURE GRANULOCYTES %: 1 % (ref 0–5)
IMMATURE GRANULOCYTES ABSOLUTE: 0.04 K/UL (ref 0–0.58)
LDL CHOLESTEROL: 69 MG/DL
LYMPHOCYTES ABSOLUTE: 1.79 K/UL (ref 1.5–4)
LYMPHOCYTES RELATIVE PERCENT: 24 % (ref 20–42)
MCH RBC QN AUTO: 29.5 PG (ref 26–35)
MCHC RBC AUTO-ENTMCNC: 32.4 G/DL (ref 32–34.5)
MCV RBC AUTO: 91 FL (ref 80–99.9)
MONOCYTES ABSOLUTE: 0.61 K/UL (ref 0.1–0.95)
MONOCYTES RELATIVE PERCENT: 8 % (ref 2–12)
NEUTROPHILS ABSOLUTE: 4.97 K/UL (ref 1.8–7.3)
NEUTROPHILS RELATIVE PERCENT: 66 % (ref 43–80)
PDW BLD-RTO: 13.2 % (ref 11.5–15)
PLATELET # BLD: 160 K/UL (ref 130–450)
PMV BLD AUTO: 11.3 FL (ref 7–12)
POTASSIUM SERPL-SCNC: 4.8 MMOL/L (ref 3.5–5)
RBC # BLD: 4.78 M/UL (ref 3.8–5.8)
SODIUM BLD-SCNC: 147 MMOL/L (ref 132–146)
T4 FREE: 1.1 NG/DL (ref 0.9–1.7)
TOTAL PROTEIN: 6.8 G/DL (ref 6.4–8.3)
TRIGL SERPL-MCNC: 80 MG/DL
TSH SERPL DL<=0.05 MIU/L-ACNC: 1.61 UIU/ML (ref 0.27–4.2)
VITAMIN D 25-HYDROXY: 56.6 NG/ML (ref 30–100)
VLDLC SERPL CALC-MCNC: 16 MG/DL
WBC # BLD: 7.6 K/UL (ref 4.5–11.5)

## 2024-04-30 ENCOUNTER — TELEPHONE (OUTPATIENT)
Dept: FAMILY MEDICINE CLINIC | Age: 74
End: 2024-04-30

## 2024-04-30 DIAGNOSIS — M10.00 ACUTE IDIOPATHIC GOUT, UNSPECIFIED SITE: Primary | ICD-10-CM

## 2024-04-30 RX ORDER — COLCHICINE 0.6 MG/1
0.6 TABLET ORAL DAILY PRN
Qty: 30 TABLET | Refills: 3 | Status: SHIPPED | OUTPATIENT
Start: 2024-04-30

## 2024-04-30 NOTE — TELEPHONE ENCOUNTER
Patient called to ask if Dr. Green would send RX for Colchicine 0.6 mg, take 1 daily as needed for Gout Lt great toe.  Patient informed me that his former PCP ordered this for him in the past, but the RX he has is .     Patient also wanted to inform Dr. Green that he is doing fine with the increase of Zoloft from 25 mg to 50 mg and will continue taking it at this dose.    Last seen 2024  Next appt 5/15/2024  Gal Whyte/Robel

## 2024-05-15 ENCOUNTER — OFFICE VISIT (OUTPATIENT)
Dept: FAMILY MEDICINE CLINIC | Age: 74
End: 2024-05-15
Payer: MEDICARE

## 2024-05-15 VITALS
TEMPERATURE: 97.3 F | DIASTOLIC BLOOD PRESSURE: 70 MMHG | SYSTOLIC BLOOD PRESSURE: 146 MMHG | BODY MASS INDEX: 28.44 KG/M2 | HEART RATE: 78 BPM | WEIGHT: 210 LBS | RESPIRATION RATE: 14 BRPM | OXYGEN SATURATION: 98 % | HEIGHT: 72 IN

## 2024-05-15 DIAGNOSIS — F32.A ANXIETY AND DEPRESSION: Primary | ICD-10-CM

## 2024-05-15 DIAGNOSIS — K21.9 GASTROESOPHAGEAL REFLUX DISEASE WITHOUT ESOPHAGITIS: ICD-10-CM

## 2024-05-15 DIAGNOSIS — E78.5 DYSLIPIDEMIA: ICD-10-CM

## 2024-05-15 DIAGNOSIS — F41.9 ANXIETY AND DEPRESSION: Primary | ICD-10-CM

## 2024-05-15 DIAGNOSIS — R73.03 PREDIABETES: ICD-10-CM

## 2024-05-15 DIAGNOSIS — I10 ESSENTIAL HYPERTENSION: ICD-10-CM

## 2024-05-15 DIAGNOSIS — E55.9 VITAMIN D DEFICIENCY: ICD-10-CM

## 2024-05-15 PROCEDURE — 99214 OFFICE O/P EST MOD 30 MIN: CPT | Performed by: FAMILY MEDICINE

## 2024-05-15 PROCEDURE — 3077F SYST BP >= 140 MM HG: CPT | Performed by: FAMILY MEDICINE

## 2024-05-15 PROCEDURE — 3078F DIAST BP <80 MM HG: CPT | Performed by: FAMILY MEDICINE

## 2024-05-15 PROCEDURE — 1123F ACP DISCUSS/DSCN MKR DOCD: CPT | Performed by: FAMILY MEDICINE

## 2024-05-15 ASSESSMENT — PATIENT HEALTH QUESTIONNAIRE - PHQ9
5. POOR APPETITE OR OVEREATING: NOT AT ALL
SUM OF ALL RESPONSES TO PHQ QUESTIONS 1-9: 0
SUM OF ALL RESPONSES TO PHQ QUESTIONS 1-9: 0
4. FEELING TIRED OR HAVING LITTLE ENERGY: NOT AT ALL
9. THOUGHTS THAT YOU WOULD BE BETTER OFF DEAD, OR OF HURTING YOURSELF: NOT AT ALL
2. FEELING DOWN, DEPRESSED OR HOPELESS: NOT AT ALL
SUM OF ALL RESPONSES TO PHQ9 QUESTIONS 1 & 2: 0
6. FEELING BAD ABOUT YOURSELF - OR THAT YOU ARE A FAILURE OR HAVE LET YOURSELF OR YOUR FAMILY DOWN: NOT AT ALL
7. TROUBLE CONCENTRATING ON THINGS, SUCH AS READING THE NEWSPAPER OR WATCHING TELEVISION: NOT AT ALL
8. MOVING OR SPEAKING SO SLOWLY THAT OTHER PEOPLE COULD HAVE NOTICED. OR THE OPPOSITE, BEING SO FIGETY OR RESTLESS THAT YOU HAVE BEEN MOVING AROUND A LOT MORE THAN USUAL: NOT AT ALL
10. IF YOU CHECKED OFF ANY PROBLEMS, HOW DIFFICULT HAVE THESE PROBLEMS MADE IT FOR YOU TO DO YOUR WORK, TAKE CARE OF THINGS AT HOME, OR GET ALONG WITH OTHER PEOPLE: NOT DIFFICULT AT ALL
SUM OF ALL RESPONSES TO PHQ QUESTIONS 1-9: 0
SUM OF ALL RESPONSES TO PHQ QUESTIONS 1-9: 0
1. LITTLE INTEREST OR PLEASURE IN DOING THINGS: NOT AT ALL
3. TROUBLE FALLING OR STAYING ASLEEP: NOT AT ALL

## 2024-05-15 ASSESSMENT — ENCOUNTER SYMPTOMS
SHORTNESS OF BREATH: 0
COUGH: 0
BACK PAIN: 1
WHEEZING: 0

## 2024-05-15 NOTE — PROGRESS NOTES
Anxiety for up to 180 days. 30 tablet 0    hydroCHLOROthiazide 12.5 MG tablet TAKE 1 TABLET DAILY 90 tablet 3    olmesartan (BENICAR) 40 MG tablet TAKE 1 TABLET DAILY 90 tablet 3    Zinc 100 MG TABS Take by mouth daily      NONFORMULARY daily Alpha lipoic acid      vitamin D3 (CHOLECALCIFEROL) 125 MCG (5000 UT) TABS tablet Take 0.5 tablets by mouth daily 30 tablet 5    Coenzyme Q10 (COQ10) 100 MG CAPS Take 100 mg by mouth daily      aspirin 81 MG EC tablet Take 1 tablet by mouth daily       No current facility-administered medications for this visit.       History    Past Medical History:   Diagnosis Date    CAD (coronary artery disease)     with stents - Dr. Martinez yearly     Cancer (HCC)     appendix    Chronic back pain     Enlarged prostate     GERD (gastroesophageal reflux disease)     Hearing loss     hearing aids    Hyperlipidemia     Hypertension     Insomnia     PONV (postoperative nausea and vomiting)     Spondylosis     L4&5    Urinary incontinence     Ventricular ectopic beats 04/12/2013       Past Surgical History:   Procedure Laterality Date    APPENDECTOMY  2/2023    COLECTOMY Right 03/22/2023    LAPAROSCOPIC ROBOTIC XI ASSISTED RIGHT HEMICOLECTOMY performed by Indira Arce MD at [facility] OR    COLONOSCOPY      COLONOSCOPY N/A 03/15/2023    COLONOSCOPY WITH BIOPSY performed by Indira Arce MD at [facility] ENDOSCOPY    COLONOSCOPY N/A 3/15/2024    COLONOSCOPY BIOPSY performed by Indira Arce MD at [facility] ENDOSCOPY    CORONARY ANGIOPLASTY      with stent 2011     DIAGNOSTIC CARDIAC CATH LAB PROCEDURE      ENDOSCOPY, COLON, DIAGNOSTIC      JOINT REPLACEMENT Right 1990, 11/2011    hip x2    JOINT REPLACEMENT Left 1990, 2007    hip x2     LAPAROSCOPIC APPENDECTOMY N/A 02/09/2023    APPENDECTOMY LAPAROSCOPIC performed by Indira Arce MD at [facility] OR    PROSTATE SURGERY  06/09/2023    Holep-Laser    SUBTOTAL COLECTOMY  3/2023    TONSILLECTOMY      UPPER GASTROINTESTINAL

## 2024-05-31 NOTE — DISCHARGE INSTRUCTIONS
[place] may be drowsy or lightheaded after receiving sedation or anesthesia. A responsible person should be with you for the next 24 hours. Please follow the instructions checked below:    DIET INSTRUCTIONS:  []Start with light diet and progress to your normal diet as you feel like eating. If you experience nausea or repeated episodes of vomiting which persist beyond 12-24 hours, notify your doctor. [x]Other - diet as tolerated    ACTIVITY INSTRUCTIONS:  [x]Rest today. Increase activity as tolerated    [x]No heavy lifting or strenuous activity until you are seen in the office for follow up    [x]No driving for 2 weeks or while on narcotics  []Other     WOUND/DRESSING INSTRUCTIONS:  Always ensure you and your care giver clean hands before and after caring for the wound. [x]May shower      []May bathe      [x]Derma bond dressing-Do not apply lotion, gel, or liquid to wound while the derma bond is in place. []Other         MEDICATION INSTRUCTIONS:    [x]Prescriptions sent with you. Use as directed. When taking pain medications, you may experience dizziness or drowsiness. Do not drink alcohol or drive when taking these medications. [x]You may take a non-prescription headache remedy, preferably one that does not contain aspirin. Other Instructions:      FOLLOW-UP CARE:  [x]Call the office at [phone] for follow-up appointment in 2 weeks    Call physician if they or any other problems occur:  Fever over 101°    Redness, swelling, hardness or warmth at the operative site  Unrelieved nausea    Foul smelling or cloudy drainage at the operative site   Unrelieved pain    Blood soaked dressing. (Some oozing may be normal)                                             Call Dr. Downey/Raul/Nell's office for an appointment as needed--[phone].   My office will arrange for a consultation for you to see Dr. Peace Canales at the [facility] to discuss prostate surgery in the near future Alert-The patient is alert, awake and responds to voice. The patient is oriented to time, place, and person. The triage nurse is able to obtain subjective information.

## 2024-07-02 ENCOUNTER — TELEPHONE (OUTPATIENT)
Dept: FAMILY MEDICINE CLINIC | Age: 74
End: 2024-07-02

## 2024-07-02 DIAGNOSIS — I10 ESSENTIAL HYPERTENSION: Primary | Chronic | ICD-10-CM

## 2024-07-02 RX ORDER — AMLODIPINE BESYLATE 2.5 MG/1
2.5 TABLET ORAL DAILY
Qty: 30 TABLET | Refills: 5 | Status: SHIPPED | OUTPATIENT
Start: 2024-07-02

## 2024-07-02 NOTE — TELEPHONE ENCOUNTER
Begin amlodipine at the low-dose of 2.5 mg daily.  Monitor home blood pressure readings and call if less than 90/60 or greater than 140/90.

## 2024-07-02 NOTE — TELEPHONE ENCOUNTER
PT stated he is concerned about his BP. Today's reading was 160/80. Pt stated that at his last visit, Norvasc was discussed. He would like to know how the PCP wants to proceed with this elevated BP.    Last seen 5/15/2024  Next appt 11/20/2024    Requested Prescriptions      No prescriptions requested or ordered in this encounter

## 2024-07-11 ENCOUNTER — TELEPHONE (OUTPATIENT)
Dept: FAMILY MEDICINE CLINIC | Age: 74
End: 2024-07-11

## 2024-07-11 DIAGNOSIS — I10 ESSENTIAL HYPERTENSION: Primary | Chronic | ICD-10-CM

## 2024-07-11 NOTE — TELEPHONE ENCOUNTER
Patient called stating since being put on a new BP medication, he was instructed to call Dr. Green with BP readings if over 140.  Patient stated that the following were recorded in the evenings -  145/72  160/81  150/78  146/70  150/88    This morning   150/70    I informed patient that Dr. Green is not in the ofc this wk and that another physician is taking care of his messages.      Last seen 5/15/2024  Next appt 11/20/2024    Requested Prescriptions      No prescriptions requested or ordered in this encounter      Gal Purvis

## 2024-07-12 RX ORDER — AMLODIPINE BESYLATE 5 MG/1
5 TABLET ORAL DAILY
Qty: 30 TABLET | Refills: 5 | Status: SHIPPED | OUTPATIENT
Start: 2024-07-12

## 2024-08-05 ENCOUNTER — APPOINTMENT (OUTPATIENT)
Dept: GENERAL RADIOLOGY | Age: 74
End: 2024-08-05
Payer: MEDICARE

## 2024-08-05 ENCOUNTER — HOSPITAL ENCOUNTER (EMERGENCY)
Age: 74
Discharge: ANOTHER HOSPITAL | End: 2024-08-05
Attending: EMERGENCY MEDICINE
Payer: MEDICARE

## 2024-08-05 ENCOUNTER — HOSPITAL ENCOUNTER (INPATIENT)
Age: 74
LOS: 4 days | Discharge: HOME HEALTH | DRG: 463 | End: 2024-08-09
Attending: STUDENT IN AN ORGANIZED HEALTH CARE EDUCATION/TRAINING PROGRAM | Admitting: STUDENT IN AN ORGANIZED HEALTH CARE EDUCATION/TRAINING PROGRAM
Payer: MEDICARE

## 2024-08-05 ENCOUNTER — APPOINTMENT (OUTPATIENT)
Dept: CT IMAGING | Age: 74
End: 2024-08-05
Payer: MEDICARE

## 2024-08-05 VITALS
DIASTOLIC BLOOD PRESSURE: 65 MMHG | HEART RATE: 60 BPM | TEMPERATURE: 98.4 F | OXYGEN SATURATION: 97 % | RESPIRATION RATE: 16 BRPM | SYSTOLIC BLOOD PRESSURE: 110 MMHG

## 2024-08-05 DIAGNOSIS — M00.9 SEPTIC ARTHRITIS, DUE TO UNSPECIFIED ORGANISM, SEPTIC ARTHRITIS OF UNSPECIFIED LOCATION (HCC): ICD-10-CM

## 2024-08-05 DIAGNOSIS — M25.451 RIGHT HIP JOINT EFFUSION: ICD-10-CM

## 2024-08-05 DIAGNOSIS — R50.9 FEVER, UNSPECIFIED FEVER CAUSE: Primary | ICD-10-CM

## 2024-08-05 DIAGNOSIS — T84.51XD INFECTION ASSOCIATED WITH INTERNAL RIGHT HIP PROSTHESIS, SUBSEQUENT ENCOUNTER: Primary | ICD-10-CM

## 2024-08-05 LAB
ALBUMIN SERPL-MCNC: 4.1 G/DL (ref 3.5–5.2)
ALP SERPL-CCNC: 80 U/L (ref 40–129)
ALT SERPL-CCNC: 12 U/L (ref 0–40)
ANION GAP SERPL CALCULATED.3IONS-SCNC: 13 MMOL/L (ref 7–16)
AST SERPL-CCNC: 20 U/L (ref 0–39)
BASOPHILS # BLD: 0.03 K/UL (ref 0–0.2)
BASOPHILS NFR BLD: 0 % (ref 0–2)
BILIRUB SERPL-MCNC: 1.4 MG/DL (ref 0–1.2)
BILIRUB UR QL STRIP: ABNORMAL
BUN SERPL-MCNC: 22 MG/DL (ref 6–23)
CALCIUM SERPL-MCNC: 9.3 MG/DL (ref 8.6–10.2)
CHLORIDE SERPL-SCNC: 103 MMOL/L (ref 98–107)
CLARITY UR: CLEAR
CO2 SERPL-SCNC: 25 MMOL/L (ref 22–29)
COLOR UR: YELLOW
CREAT SERPL-MCNC: 1.3 MG/DL (ref 0.7–1.2)
CRP SERPL HS-MCNC: 123 MG/L (ref 0–5)
EOSINOPHIL # BLD: 0.02 K/UL (ref 0.05–0.5)
EOSINOPHILS RELATIVE PERCENT: 0 % (ref 0–6)
ERYTHROCYTE [DISTWIDTH] IN BLOOD BY AUTOMATED COUNT: 13.3 % (ref 11.5–15)
ERYTHROCYTE [SEDIMENTATION RATE] IN BLOOD BY WESTERGREN METHOD: 21 MM/HR (ref 0–15)
GFR, ESTIMATED: 61 ML/MIN/1.73M2
GLUCOSE SERPL-MCNC: 113 MG/DL (ref 74–99)
GLUCOSE UR STRIP-MCNC: NEGATIVE MG/DL
HCT VFR BLD AUTO: 45.1 % (ref 37–54)
HGB BLD-MCNC: 14.5 G/DL (ref 12.5–16.5)
HGB UR QL STRIP.AUTO: NEGATIVE
IMM GRANULOCYTES # BLD AUTO: 0.1 K/UL (ref 0–0.58)
IMM GRANULOCYTES NFR BLD: 1 % (ref 0–5)
INR PPP: 1.3
KETONES UR STRIP-MCNC: ABNORMAL MG/DL
LACTATE BLDV-SCNC: 1.2 MMOL/L (ref 0.5–2.2)
LEUKOCYTE ESTERASE UR QL STRIP: NEGATIVE
LYMPHOCYTES NFR BLD: 1.16 K/UL (ref 1.5–4)
LYMPHOCYTES RELATIVE PERCENT: 7 % (ref 20–42)
MAGNESIUM SERPL-MCNC: 2.3 MG/DL (ref 1.6–2.6)
MCH RBC QN AUTO: 28.8 PG (ref 26–35)
MCV RBC AUTO: 89.7 FL (ref 80–99.9)
MONOCYTES NFR BLD: 1.53 K/UL (ref 0.1–0.95)
MONOCYTES NFR BLD: 9 % (ref 2–12)
NEUTROPHILS NFR BLD: 84 % (ref 43–80)
NEUTS SEG NFR BLD: 14.36 K/UL (ref 1.8–7.3)
NITRITE UR QL STRIP: NEGATIVE
PH UR STRIP: 6 [PH] (ref 5–9)
PLATELET # BLD AUTO: 144 K/UL (ref 130–450)
PMV BLD AUTO: 11 FL (ref 7–12)
POTASSIUM SERPL-SCNC: 4.2 MMOL/L (ref 3.5–5)
PROT SERPL-MCNC: 7.3 G/DL (ref 6.4–8.3)
PROT UR STRIP-MCNC: ABNORMAL MG/DL
PROTHROMBIN TIME: 14.2 SEC (ref 9.3–12.4)
RBC # BLD AUTO: 5.03 M/UL (ref 3.8–5.8)
RBC # BLD: ABNORMAL 10*6/UL
RBC #/AREA URNS HPF: ABNORMAL /HPF
SARS-COV-2 RDRP RESP QL NAA+PROBE: NOT DETECTED
SODIUM SERPL-SCNC: 141 MMOL/L (ref 132–146)
SP GR UR STRIP: >1.03 (ref 1–1.03)
SPECIMEN DESCRIPTION: NORMAL
UROBILINOGEN UR STRIP-ACNC: 0.2 EU/DL (ref 0–1)
WBC #/AREA URNS HPF: ABNORMAL /HPF
WBC OTHER # BLD: 17.2 K/UL (ref 4.5–11.5)

## 2024-08-05 PROCEDURE — 80053 COMPREHEN METABOLIC PANEL: CPT

## 2024-08-05 PROCEDURE — 73502 X-RAY EXAM HIP UNI 2-3 VIEWS: CPT

## 2024-08-05 PROCEDURE — 86140 C-REACTIVE PROTEIN: CPT

## 2024-08-05 PROCEDURE — 83735 ASSAY OF MAGNESIUM: CPT

## 2024-08-05 PROCEDURE — 85610 PROTHROMBIN TIME: CPT

## 2024-08-05 PROCEDURE — 87040 BLOOD CULTURE FOR BACTERIA: CPT

## 2024-08-05 PROCEDURE — 85652 RBC SED RATE AUTOMATED: CPT

## 2024-08-05 PROCEDURE — 99285 EMERGENCY DEPT VISIT HI MDM: CPT

## 2024-08-05 PROCEDURE — 2580000003 HC RX 258: Performed by: NURSE PRACTITIONER

## 2024-08-05 PROCEDURE — 85025 COMPLETE CBC W/AUTO DIFF WBC: CPT

## 2024-08-05 PROCEDURE — 99223 1ST HOSP IP/OBS HIGH 75: CPT | Performed by: STUDENT IN AN ORGANIZED HEALTH CARE EDUCATION/TRAINING PROGRAM

## 2024-08-05 PROCEDURE — 73700 CT LOWER EXTREMITY W/O DYE: CPT

## 2024-08-05 PROCEDURE — 81001 URINALYSIS AUTO W/SCOPE: CPT

## 2024-08-05 PROCEDURE — APPSS45 APP SPLIT SHARED TIME 31-45 MINUTES: Performed by: NURSE PRACTITIONER

## 2024-08-05 PROCEDURE — 6370000000 HC RX 637 (ALT 250 FOR IP): Performed by: NURSE PRACTITIONER

## 2024-08-05 PROCEDURE — 87635 SARS-COV-2 COVID-19 AMP PRB: CPT

## 2024-08-05 PROCEDURE — 1200000000 HC SEMI PRIVATE

## 2024-08-05 PROCEDURE — 71046 X-RAY EXAM CHEST 2 VIEWS: CPT

## 2024-08-05 PROCEDURE — 83605 ASSAY OF LACTIC ACID: CPT

## 2024-08-05 RX ORDER — LANOLIN ALCOHOL/MO/W.PET/CERES
400 CREAM (GRAM) TOPICAL DAILY
Status: DISCONTINUED | OUTPATIENT
Start: 2024-08-06 | End: 2024-08-09 | Stop reason: HOSPADM

## 2024-08-05 RX ORDER — SUCRALFATE 1 G/1
1 TABLET ORAL 4 TIMES DAILY
Status: DISCONTINUED | OUTPATIENT
Start: 2024-08-05 | End: 2024-08-09 | Stop reason: HOSPADM

## 2024-08-05 RX ORDER — ACETAMINOPHEN 325 MG/1
650 TABLET ORAL EVERY 6 HOURS PRN
Status: DISCONTINUED | OUTPATIENT
Start: 2024-08-05 | End: 2024-08-09 | Stop reason: HOSPADM

## 2024-08-05 RX ORDER — DOCUSATE SODIUM 100 MG/1
100 CAPSULE, LIQUID FILLED ORAL DAILY
Status: DISCONTINUED | OUTPATIENT
Start: 2024-08-06 | End: 2024-08-09 | Stop reason: HOSPADM

## 2024-08-05 RX ORDER — POLYETHYLENE GLYCOL 3350 17 G/17G
17 POWDER, FOR SOLUTION ORAL DAILY PRN
Status: DISCONTINUED | OUTPATIENT
Start: 2024-08-05 | End: 2024-08-09 | Stop reason: HOSPADM

## 2024-08-05 RX ORDER — ENOXAPARIN SODIUM 100 MG/ML
40 INJECTION SUBCUTANEOUS DAILY
Status: DISCONTINUED | OUTPATIENT
Start: 2024-08-06 | End: 2024-08-09 | Stop reason: HOSPADM

## 2024-08-05 RX ORDER — ZINC SULFATE 50(220)MG
50 CAPSULE ORAL DAILY
Status: DISCONTINUED | OUTPATIENT
Start: 2024-08-05 | End: 2024-08-09 | Stop reason: HOSPADM

## 2024-08-05 RX ORDER — SODIUM CHLORIDE 0.9 % (FLUSH) 0.9 %
5-40 SYRINGE (ML) INJECTION PRN
Status: DISCONTINUED | OUTPATIENT
Start: 2024-08-05 | End: 2024-08-07 | Stop reason: SDUPTHER

## 2024-08-05 RX ORDER — ROSUVASTATIN CALCIUM 20 MG/1
40 TABLET, COATED ORAL NIGHTLY
Status: DISCONTINUED | OUTPATIENT
Start: 2024-08-05 | End: 2024-08-09 | Stop reason: HOSPADM

## 2024-08-05 RX ORDER — MAGNESIUM SULFATE IN WATER 40 MG/ML
2000 INJECTION, SOLUTION INTRAVENOUS PRN
Status: DISCONTINUED | OUTPATIENT
Start: 2024-08-05 | End: 2024-08-09 | Stop reason: HOSPADM

## 2024-08-05 RX ORDER — AMLODIPINE BESYLATE 5 MG/1
5 TABLET ORAL NIGHTLY
Status: DISCONTINUED | OUTPATIENT
Start: 2024-08-05 | End: 2024-08-09 | Stop reason: HOSPADM

## 2024-08-05 RX ORDER — SODIUM CHLORIDE 0.9 % (FLUSH) 0.9 %
5-40 SYRINGE (ML) INJECTION EVERY 12 HOURS SCHEDULED
Status: DISCONTINUED | OUTPATIENT
Start: 2024-08-05 | End: 2024-08-07 | Stop reason: SDUPTHER

## 2024-08-05 RX ORDER — POTASSIUM CHLORIDE 7.45 MG/ML
10 INJECTION INTRAVENOUS PRN
Status: DISCONTINUED | OUTPATIENT
Start: 2024-08-05 | End: 2024-08-09 | Stop reason: HOSPADM

## 2024-08-05 RX ORDER — SODIUM CHLORIDE 9 MG/ML
INJECTION, SOLUTION INTRAVENOUS PRN
Status: DISCONTINUED | OUTPATIENT
Start: 2024-08-05 | End: 2024-08-07 | Stop reason: SDUPTHER

## 2024-08-05 RX ORDER — LOSARTAN POTASSIUM 50 MG/1
100 TABLET ORAL DAILY
Status: DISCONTINUED | OUTPATIENT
Start: 2024-08-06 | End: 2024-08-09 | Stop reason: HOSPADM

## 2024-08-05 RX ORDER — SODIUM CHLORIDE 9 MG/ML
INJECTION, SOLUTION INTRAVENOUS CONTINUOUS
Status: ACTIVE | OUTPATIENT
Start: 2024-08-05 | End: 2024-08-07

## 2024-08-05 RX ORDER — POTASSIUM CHLORIDE 20 MEQ/1
40 TABLET, EXTENDED RELEASE ORAL PRN
Status: DISCONTINUED | OUTPATIENT
Start: 2024-08-05 | End: 2024-08-09 | Stop reason: HOSPADM

## 2024-08-05 RX ORDER — VITAMIN B COMPLEX
2500 TABLET ORAL DAILY
Status: DISCONTINUED | OUTPATIENT
Start: 2024-08-06 | End: 2024-08-09 | Stop reason: HOSPADM

## 2024-08-05 RX ORDER — ONDANSETRON 2 MG/ML
4 INJECTION INTRAMUSCULAR; INTRAVENOUS EVERY 6 HOURS PRN
Status: DISCONTINUED | OUTPATIENT
Start: 2024-08-05 | End: 2024-08-09 | Stop reason: HOSPADM

## 2024-08-05 RX ORDER — ACETAMINOPHEN 650 MG/1
650 SUPPOSITORY RECTAL EVERY 6 HOURS PRN
Status: DISCONTINUED | OUTPATIENT
Start: 2024-08-05 | End: 2024-08-09 | Stop reason: HOSPADM

## 2024-08-05 RX ORDER — PANTOPRAZOLE SODIUM 40 MG/1
40 TABLET, DELAYED RELEASE ORAL DAILY PRN
Status: DISCONTINUED | OUTPATIENT
Start: 2024-08-05 | End: 2024-08-09 | Stop reason: HOSPADM

## 2024-08-05 RX ORDER — ONDANSETRON 4 MG/1
4 TABLET, ORALLY DISINTEGRATING ORAL EVERY 8 HOURS PRN
Status: DISCONTINUED | OUTPATIENT
Start: 2024-08-05 | End: 2024-08-09 | Stop reason: HOSPADM

## 2024-08-05 RX ADMIN — ACETAMINOPHEN 650 MG: 325 TABLET ORAL at 23:34

## 2024-08-05 RX ADMIN — SODIUM CHLORIDE: 9 INJECTION, SOLUTION INTRAVENOUS at 23:57

## 2024-08-05 RX ADMIN — SODIUM CHLORIDE, PRESERVATIVE FREE 10 ML: 5 INJECTION INTRAVENOUS at 23:58

## 2024-08-05 ASSESSMENT — LIFESTYLE VARIABLES
HOW MANY STANDARD DRINKS CONTAINING ALCOHOL DO YOU HAVE ON A TYPICAL DAY: PATIENT DOES NOT DRINK
HOW OFTEN DO YOU HAVE A DRINK CONTAINING ALCOHOL: NEVER

## 2024-08-05 ASSESSMENT — PAIN - FUNCTIONAL ASSESSMENT: PAIN_FUNCTIONAL_ASSESSMENT: NONE - DENIES PAIN

## 2024-08-05 NOTE — ED PROVIDER NOTES
4.1 3.5 - 5.2 g/dL    Total Bilirubin 1.4 (H) 0.0 - 1.2 mg/dL    Alkaline Phosphatase 80 40 - 129 U/L    ALT 12 0 - 40 U/L    AST 20 0 - 39 U/L   Sedimentation Rate   Result Value Ref Range    Sed Rate, Automated 21 (H) 0 - 15 mm/Hr   C-Reactive Protein   Result Value Ref Range    .0 (H) 0 - 5 mg/L   Magnesium   Result Value Ref Range    Magnesium 2.3 1.6 - 2.6 mg/dL   Protime-INR   Result Value Ref Range    Protime 14.2 (H) 9.3 - 12.4 sec    INR 1.3    Lactic Acid   Result Value Ref Range    Lactic Acid 1.2 0.5 - 2.2 mmol/L   Urinalysis with Microscopic   Result Value Ref Range    Color, UA Yellow Yellow    Turbidity UA Clear Clear    Glucose, Ur NEGATIVE NEGATIVE mg/dL    Bilirubin, Urine SMALL (A) NEGATIVE    Ketones, Urine TRACE (A) NEGATIVE mg/dL    Specific Gravity, UA >1.030 (H) 1.005 - 1.030    Urine Hgb NEGATIVE NEGATIVE    pH, Urine 6.0 5.0 - 9.0    Protein, UA TRACE (A) NEGATIVE mg/dL    Urobilinogen, Urine 0.2 0.0 - 1.0 EU/dL    Nitrite, Urine NEGATIVE NEGATIVE    Leukocyte Esterase, Urine NEGATIVE NEGATIVE    WBC, UA 0 TO 5 0 TO 5 /HPF    RBC, UA 0 TO 2 0 TO 2 /HPF       RADIOLOGY:  Interpreted by Radiologist.  XR HIP 2-3 VW W PELVIS RIGHT   Final Result   1. Bilateral total hip arthroplasties with satisfactory alignment and   positioning.   2. Sub chondral cystic changes especially on the left within the native   acetabulum.   3. Moderate osteoarthritic changes of the lumbar spine.      RECOMMENDATION:   See the CT right hip the follow-up.         XR CHEST (2 VW)   Final Result   1. No acute cardiopulmonary disease.   2. Moderate discogenic changes of the thoracic spine without compression   fractures.         CT HIP RIGHT WO CONTRAST   Final Result   Bilateral total hip arthroplasty. Associated artifact somewhat obscuring   evaluation of the pelvis and adjacent soft tissues. No acute osseous   findings. Chronic fragmentation of the greater trochanter. No appreciable   organized fluid collection

## 2024-08-06 ENCOUNTER — APPOINTMENT (OUTPATIENT)
Dept: GENERAL RADIOLOGY | Age: 74
DRG: 463 | End: 2024-08-06
Attending: ORTHOPAEDIC SURGERY
Payer: MEDICARE

## 2024-08-06 LAB
ALBUMIN SERPL-MCNC: 3.5 G/DL (ref 3.5–5.2)
ALP SERPL-CCNC: 65 U/L (ref 40–129)
ALT SERPL-CCNC: 10 U/L (ref 0–40)
ANION GAP SERPL CALCULATED.3IONS-SCNC: 10 MMOL/L (ref 7–16)
AST SERPL-CCNC: 17 U/L (ref 0–39)
BASOPHILS # BLD: 0.02 K/UL (ref 0–0.2)
BASOPHILS NFR BLD: 0 % (ref 0–2)
BILIRUB SERPL-MCNC: 1.1 MG/DL (ref 0–1.2)
BUN SERPL-MCNC: 25 MG/DL (ref 6–23)
CALCIUM SERPL-MCNC: 8.6 MG/DL (ref 8.6–10.2)
CHLORIDE SERPL-SCNC: 103 MMOL/L (ref 98–107)
CO2 SERPL-SCNC: 24 MMOL/L (ref 22–29)
CREAT SERPL-MCNC: 1.3 MG/DL (ref 0.7–1.2)
EOSINOPHIL # BLD: 0.01 K/UL (ref 0.05–0.5)
EOSINOPHILS RELATIVE PERCENT: 0 % (ref 0–6)
ERYTHROCYTE [DISTWIDTH] IN BLOOD BY AUTOMATED COUNT: 13.4 % (ref 11.5–15)
GFR, ESTIMATED: 60 ML/MIN/1.73M2
GLUCOSE SERPL-MCNC: 99 MG/DL (ref 74–99)
HCT VFR BLD AUTO: 40.1 % (ref 37–54)
HGB BLD-MCNC: 12.9 G/DL (ref 12.5–16.5)
IMM GRANULOCYTES # BLD AUTO: 0.07 K/UL (ref 0–0.58)
IMM GRANULOCYTES NFR BLD: 1 % (ref 0–5)
LACTATE BLDV-SCNC: 1 MMOL/L (ref 0.5–2.2)
LYMPHOCYTES NFR BLD: 0.93 K/UL (ref 1.5–4)
LYMPHOCYTES RELATIVE PERCENT: 8 % (ref 20–42)
MCH RBC QN AUTO: 29.4 PG (ref 26–35)
MCHC RBC AUTO-ENTMCNC: 32.2 G/DL (ref 32–34.5)
MCV RBC AUTO: 91.3 FL (ref 80–99.9)
MONOCYTES NFR BLD: 1.05 K/UL (ref 0.1–0.95)
MONOCYTES NFR BLD: 9 % (ref 2–12)
NEUTROPHILS NFR BLD: 82 % (ref 43–80)
NEUTS SEG NFR BLD: 9.53 K/UL (ref 1.8–7.3)
PLATELET # BLD AUTO: 122 K/UL (ref 130–450)
PMV BLD AUTO: 11.6 FL (ref 7–12)
POTASSIUM SERPL-SCNC: 3.7 MMOL/L (ref 3.5–5)
PROCALCITONIN SERPL-MCNC: 0.32 NG/ML (ref 0–0.08)
PROT SERPL-MCNC: 6.6 G/DL (ref 6.4–8.3)
RBC # BLD AUTO: 4.39 M/UL (ref 3.8–5.8)
SODIUM SERPL-SCNC: 137 MMOL/L (ref 132–146)
WBC OTHER # BLD: 11.6 K/UL (ref 4.5–11.5)

## 2024-08-06 PROCEDURE — 85025 COMPLETE CBC W/AUTO DIFF WBC: CPT

## 2024-08-06 PROCEDURE — 99232 SBSQ HOSP IP/OBS MODERATE 35: CPT | Performed by: STUDENT IN AN ORGANIZED HEALTH CARE EDUCATION/TRAINING PROGRAM

## 2024-08-06 PROCEDURE — 2709999900 FL ARTHR/ASP/INJ MAJOR JT/BURSA RT WO US

## 2024-08-06 PROCEDURE — 87070 CULTURE OTHR SPECIMN AEROBIC: CPT

## 2024-08-06 PROCEDURE — 84145 PROCALCITONIN (PCT): CPT

## 2024-08-06 PROCEDURE — 87077 CULTURE AEROBIC IDENTIFY: CPT

## 2024-08-06 PROCEDURE — 36415 COLL VENOUS BLD VENIPUNCTURE: CPT

## 2024-08-06 PROCEDURE — 87206 SMEAR FLUORESCENT/ACID STAI: CPT

## 2024-08-06 PROCEDURE — 1200000000 HC SEMI PRIVATE

## 2024-08-06 PROCEDURE — 87205 SMEAR GRAM STAIN: CPT

## 2024-08-06 PROCEDURE — 2500000003 HC RX 250 WO HCPCS: Performed by: RADIOLOGY

## 2024-08-06 PROCEDURE — 87075 CULTR BACTERIA EXCEPT BLOOD: CPT

## 2024-08-06 PROCEDURE — 83605 ASSAY OF LACTIC ACID: CPT

## 2024-08-06 PROCEDURE — 6370000000 HC RX 637 (ALT 250 FOR IP): Performed by: NURSE PRACTITIONER

## 2024-08-06 PROCEDURE — 87102 FUNGUS ISOLATION CULTURE: CPT

## 2024-08-06 PROCEDURE — 6370000000 HC RX 637 (ALT 250 FOR IP): Performed by: ORTHOPAEDIC SURGERY

## 2024-08-06 PROCEDURE — 80053 COMPREHEN METABOLIC PANEL: CPT

## 2024-08-06 PROCEDURE — 2580000003 HC RX 258: Performed by: NURSE PRACTITIONER

## 2024-08-06 PROCEDURE — 87015 SPECIMEN INFECT AGNT CONCNTJ: CPT

## 2024-08-06 RX ORDER — HYDROCODONE BITARTRATE AND ACETAMINOPHEN 10; 325 MG/1; MG/1
1 TABLET ORAL EVERY 4 HOURS PRN
Status: DISCONTINUED | OUTPATIENT
Start: 2024-08-06 | End: 2024-08-09 | Stop reason: HOSPADM

## 2024-08-06 RX ORDER — MORPHINE SULFATE 2 MG/ML
2 INJECTION, SOLUTION INTRAMUSCULAR; INTRAVENOUS
Status: DISCONTINUED | OUTPATIENT
Start: 2024-08-06 | End: 2024-08-09 | Stop reason: HOSPADM

## 2024-08-06 RX ORDER — HYDROCODONE BITARTRATE AND ACETAMINOPHEN 5; 325 MG/1; MG/1
1 TABLET ORAL EVERY 4 HOURS PRN
Status: DISCONTINUED | OUTPATIENT
Start: 2024-08-06 | End: 2024-08-09 | Stop reason: HOSPADM

## 2024-08-06 RX ORDER — LIDOCAINE HYDROCHLORIDE 10 MG/ML
INJECTION, SOLUTION INFILTRATION; PERINEURAL PRN
Status: COMPLETED | OUTPATIENT
Start: 2024-08-06 | End: 2024-08-06

## 2024-08-06 RX ADMIN — ZINC SULFATE 220 MG (50 MG) CAPSULE 50 MG: CAPSULE at 20:49

## 2024-08-06 RX ADMIN — MAGNESIUM OXIDE 400 MG (241.3 MG MAGNESIUM) TABLET 400 MG: TABLET at 08:58

## 2024-08-06 RX ADMIN — AMLODIPINE BESYLATE 5 MG: 5 TABLET ORAL at 20:49

## 2024-08-06 RX ADMIN — SUCRALFATE 1 G: 1 TABLET ORAL at 09:00

## 2024-08-06 RX ADMIN — ROSUVASTATIN CALCIUM 40 MG: 20 TABLET, FILM COATED ORAL at 20:48

## 2024-08-06 RX ADMIN — LIDOCAINE HYDROCHLORIDE 5 ML: 10 INJECTION, SOLUTION INFILTRATION; PERINEURAL at 12:25

## 2024-08-06 RX ADMIN — HYDROCODONE BITARTRATE AND ACETAMINOPHEN 1 TABLET: 10; 325 TABLET ORAL at 20:49

## 2024-08-06 RX ADMIN — SERTRALINE HYDROCHLORIDE 50 MG: 50 TABLET ORAL at 09:00

## 2024-08-06 RX ADMIN — DOCUSATE SODIUM 100 MG: 100 CAPSULE, LIQUID FILLED ORAL at 20:49

## 2024-08-06 RX ADMIN — LOSARTAN POTASSIUM 100 MG: 50 TABLET, FILM COATED ORAL at 09:00

## 2024-08-06 RX ADMIN — Medication 2500 UNITS: at 08:57

## 2024-08-06 RX ADMIN — SODIUM CHLORIDE, PRESERVATIVE FREE 10 ML: 5 INJECTION INTRAVENOUS at 09:01

## 2024-08-06 RX ADMIN — SODIUM CHLORIDE: 9 INJECTION, SOLUTION INTRAVENOUS at 13:18

## 2024-08-06 RX ADMIN — SODIUM CHLORIDE, PRESERVATIVE FREE 10 ML: 5 INJECTION INTRAVENOUS at 20:52

## 2024-08-06 ASSESSMENT — PAIN SCALES - GENERAL
PAINLEVEL_OUTOF10: 8
PAINLEVEL_OUTOF10: 4

## 2024-08-06 ASSESSMENT — PAIN - FUNCTIONAL ASSESSMENT: PAIN_FUNCTIONAL_ASSESSMENT: NONE - DENIES PAIN

## 2024-08-06 NOTE — CONSULTS
Department of Orthopedic Surgery  Attending Consult Note        Reason for Consult:  right hip pain  Requesting Physician:  ER    CHIEF COMPLAINT:  Acute Right hip pain and fever    History Obtained From:  patient    HISTORY OF PRESENT ILLNESS:                The patient is a 73 y.o. male who presents with acute right hip pain.  Patient was watching TV Sunday night when he developed a 101 fever.  When patient went to get out of his chair he had significant right hip pain and unable to bear weight.  No recent infections or trauma.  Patient had h/o right ZENY revision in 2010.  Also has h/o of Left ZENY revision that is not currently painful.    Past Medical History:        Diagnosis Date    CAD (coronary artery disease)     with stents - Dr. Martinez yearly     Cancer (HCC)     appendix    Chronic back pain     Enlarged prostate     GERD (gastroesophageal reflux disease)     Hearing loss     hearing aids    Hyperlipidemia     Hypertension     Insomnia     PONV (postoperative nausea and vomiting)     Spondylosis     L4&5    Urinary incontinence     Ventricular ectopic beats 04/12/2013     Past Surgical History:        Procedure Laterality Date    APPENDECTOMY  2/2023    COLECTOMY Right 03/22/2023    LAPAROSCOPIC ROBOTIC XI ASSISTED RIGHT HEMICOLECTOMY performed by Indira Arce MD at [facility] OR    COLONOSCOPY      COLONOSCOPY N/A 03/15/2023    COLONOSCOPY WITH BIOPSY performed by Indira Arce MD at [facility] ENDOSCOPY    COLONOSCOPY N/A 3/15/2024    COLONOSCOPY BIOPSY performed by Indira Arce MD at [facility] ENDOSCOPY    CORONARY ANGIOPLASTY      with stent 2011     DIAGNOSTIC CARDIAC CATH LAB PROCEDURE      ENDOSCOPY, COLON, DIAGNOSTIC      JOINT REPLACEMENT Right 1990, 11/2011    hip x2    JOINT REPLACEMENT Left 1990, 2007    hip x2     LAPAROSCOPIC APPENDECTOMY N/A 02/09/2023    APPENDECTOMY LAPAROSCOPIC performed by Indira Arce MD at [facility] OR    PROSTATE SURGERY  06/09/2023

## 2024-08-06 NOTE — PROGRESS NOTES
Message left with DR Jones office regarding results of IR, and low amount of fluid obtained, and not all test could be run. IR sending the small amount of fluid to lab but do not think they run cell count with diff. Also need order for lyme PCR needs to be ordered as a blood lab work.

## 2024-08-06 NOTE — PROGRESS NOTES
SPIRITUAL HEALTH SERVICES - SARAH Anaya Encounter    Name: Edilberto Hillman                  Referral: Routine Visit    Sacraments  Anointed (Last Rites): Yes  Apostolic Melissa: No  Confession: No  Communion: Yes     Assessment:  Patient receptive to  visit.      Intervention:   provided spiritual support and sacramental ministry for patient.     Outcome:  Patient expressed gratitude for visit.    Plan:  Chaplains will remain available to offer spiritual and emotional support as needed.      Electronically signed by Chaplain Oleksandr, on 8/6/2024 at 7:25 PM.  Spiritual Care Department  [facility]  [phone]

## 2024-08-06 NOTE — PROGRESS NOTES
4 Eyes Skin Assessment     NAME:  Edilberto Hillman  YOB: 1950  MEDICAL RECORD NUMBER:  [id number]    The patient is being assessed for  Admission    I agree that at least one RN has performed a thorough Head to Toe Skin Assessment on the patient. ALL assessment sites listed below have been assessed.      Areas assessed by both nurses:    Head, Face, Ears, Shoulders, Back, Chest, Arms, Elbows, Hands, Sacrum. Buttock, Coccyx, Ischium, Legs. Feet and Heels, and Under Medical Devices         Does the Patient have a Wound? No noted wound(s)       Edward Prevention initiated by RN: No  Wound Care Orders initiated by RN: No    Pressure Injury (Stage 3,4, Unstageable, DTI, NWPT, and Complex wounds) if present, place Wound referral order by RN under : No    New Ostomies, if present place, Ostomy referral order under : No     Nurse 1 eSignature: Electronically signed by Franchesca Stallings RN on 8/6/24 at 4:58 AM EDT    **SHARE this note so that the co-signing nurse can place an eSignature**    Nurse 2 eSignature: Electronically signed by Deepa Ramírez RN on 8/6/24 at 5:00 AM EDT

## 2024-08-06 NOTE — PROGRESS NOTES
Called from lab regarding right hip aspiration. Not enough fluid to obtain some results, will send downtown to have what can be drawn and that Lyme PCR is a blood draw via a yellow tube.   Call placed to floor spoke with Melva AMBRY regarding message from to to let BRENDAN Gonzales aware of some test might be cancelled by downtown lab. This lab already cancelled cell count and diff.

## 2024-08-06 NOTE — PROGRESS NOTES
[facility] Hospitalist Progress Note    Admitting Date and Time: 8/5/2024  9:50 PM  Admit Dx: Septic arthritis (HCC) [M00.9]    Subjective:  Patient is being followed for Septic arthritis (HCC) [M00.9]   Pt feels okay  Per RN: no additional concerns    ROS: denies fever, chills, cp, sob, n/v, HA unless otherwise noted above     sodium chloride flush  5-40 mL IntraVENous 2 times per day    enoxaparin  40 mg SubCUTAneous Daily    amLODIPine  5 mg Oral Nightly    docusate sodium  100 mg Oral Daily    magnesium oxide  400 mg Oral Daily    losartan  100 mg Oral Daily    rosuvastatin  40 mg Oral Nightly    sertraline  50 mg Oral Daily    sucralfate  1 g Oral 4x Daily    Vitamin D  2,500 Units Oral Daily    zinc sulfate  50 mg Oral Daily     morphine, 2 mg, Q3H PRN  HYDROcodone-acetaminophen, 1 tablet, Q4H PRN  HYDROcodone 5 mg - acetaminophen, 1 tablet, Q4H PRN  sodium chloride flush, 5-40 mL, PRN  sodium chloride, , PRN  potassium chloride, 40 mEq, PRN   Or  potassium alternative oral replacement, 40 mEq, PRN   Or  potassium chloride, 10 mEq, PRN  magnesium sulfate, 2,000 mg, PRN  ondansetron, 4 mg, Q8H PRN   Or  ondansetron, 4 mg, Q6H PRN  polyethylene glycol, 17 g, Daily PRN  acetaminophen, 650 mg, Q6H PRN   Or  acetaminophen, 650 mg, Q6H PRN  pantoprazole, 40 mg, Daily PRN         Objective:  /66   Pulse 62   Temp 98.7 °F (37.1 °C) (Oral)   Resp 18   SpO2 96%     General Appearance: alert and oriented to person, place and time and in NAD, laying in bed with ice pack on forehead  Skin: warm and dry  Head: normocephalic and atraumatic  Eyes: PERRL, EOMI, conjunctivae normal  Neck: neck supple, trachea midline   Pulmonary/Chest: CTAB, no w/r/r, normal air movement, no respiratory distress  Cardiovascular: RRR, no murmurs  Abdomen: soft, non-tender, non-distended, normal bowel sounds, no masses or organomegaly  Extremities: no cyanosis, no clubbing and no edema  Neurologic: no cranial nerve deficit and  speech normal      Recent Labs     08/05/24  1100 08/06/24  0552    137   K 4.2 3.7    103   CO2 25 24   BUN 22 25*   CREATININE 1.3* 1.3*   GLUCOSE 113* 99   CALCIUM 9.3 8.6       Recent Labs     08/05/24  1100 08/06/24  0552   WBC 17.2* 11.6*   RBC 5.03 4.39   HGB 14.5 12.9   HCT 45.1 40.1   MCV 89.7 91.3   MCH 28.8 29.4   MCHC 32.2 32.2   RDW 13.3 13.4    122*   MPV 11.0 11.6       Radiology:  IR Interventional Radiology Procedure Request    (Results Pending)        Assessment:  Principal Problem:    Septic arthritis (HCC)  Active Problems:    Primary hypertension  Resolved Problems:    * No resolved hospital problems. *      Plan:  Suspected right hip septic arthritis- IR aspiration drain 8/6, follow fluid studies, only 1cc aspirated. Follow BCx, note  ESR 21, procal 0.32, WBC 17.2 on admit. Ortho c/s, may require further I&D w/ head and linear if proven septic arthritis. If septic arthritis confirmed plan ID c/s for long-term abx, defer pending gram stain  Sepsis- fever, leukocytosis on admit, 2/2 above, treat as above  Right ZENY in 2010- stable, rest of the treatment as per 1. Ortho c/s  Primary HTN- stable, continue home medications      Dispo: pending    NOTE: Portions of this report may have been transcribed using voice recognition software. Every effort was made to ensure accuracy; however, inadvertent computerized transcription errors may be present.  Electronically signed by Jose Alfredo Gama MD on 8/6/2024 at 10:59 AM

## 2024-08-06 NOTE — CARE COORDINATION
Initial CM Assessment-Met with patient at the bedside, introduced myself and CM role in care coordination. Patient lives in a tow story home with his wife Dede, main bedroom/bath are on the first level. Admitted for septic arthritis, currently using crutchs to ambulate with-normally he does not use any assistive devices. PCP is Dr. Corey Green, preferred pharmacy is [facility] on [address]. No recent history of HHC or SNF. Discharge plan is home with wife, no needs anticipated.     Jeanna JAYN, RN  [facility]  .

## 2024-08-06 NOTE — OR NURSING
Patient arrival from room to radiology for right hip aspiration. Vitals taken, consent signed, and Dr. aGrvey in to speak with the patient about the procedure.   Patient placed supine on Fluoroscopy table, patient prepped and draped. Using fluoroscopy imaging,  20 gauge x 3. 5 inch spinal needle placed to right hip by Dr. Garvey @  1226.   Fluid aspirated small amount blood tinged 1 cc removed and sent to lab.   Needle removed, site cleansed, and band aid applied to site. Patient placed on cart and returned to room, report called to floor CLOTILDE Bryant.

## 2024-08-06 NOTE — H&P
[facility] Hospitalist Group History and Physical      CHIEF COMPLAINT:  Right hip pain and fever    History of Present Illness:  This is a 73 year old male with PMH significant for CAD s/p stents, cancer of the appendix s/p hemicolectomy, enlarged prostate, GERD, HLD, HTN, and bilateral hip replacements x 2 each side.  Patient to ED due to right hip pain and fever that developed a day ago.  Tmax at home up to 101 °F.  Positive chills.  Called orthopedic surgeon and told to go to ED for additional evaluation.  Initially at [facility] and then transferred to [place].  Patient reports last right hip replacement done 2010.  States that the pain was constant and now it is intermittent and sharp.  Unable to raise right leg and unable to put weight on it with ambulation.  Denies recent trauma and illness.  Denies shortness of breath, chest pain, nausea/vomiting, abdominal pain, and changes in bowel/bladder habits.    Initial vital signs on arrival to [place] temperature 101.1 °F, HR 54, RR 18, and /65.  Lab results remarkable for creatinine 1.3, , sed rate 21, total bili 1.4, and WBC 17.2.  Blood cultures x 2 done.  Urine showing no infection.  CT scan of the right hip showing nonspecific right hip joint effusion.  Chest x-ray showing no acute cardiopulmonary disease.  Case discussed with orthopedist by ED doc and would like IR to drain right hip effusion.  No antibiotics initiated at this time.  Will admit for further evaluation and treatment.    Informant(s) for H&P: Patient and chart review    REVIEW OF SYSTEMS:  A comprehensive review of systems was negative except for: what is in the HPI      PMH:  Past Medical History:   Diagnosis Date    CAD (coronary artery disease)     with stents - Dr. Martinez yearly     Cancer (HCC)     appendix    Chronic back pain     Enlarged prostate     GERD (gastroesophageal reflux disease)     Hearing loss     hearing aids    Hyperlipidemia     Hypertension     Insomnia

## 2024-08-06 NOTE — PLAN OF CARE
Problem: Discharge Planning  Goal: Discharge to home or other facility with appropriate resources  8/6/2024 1033 by Tracy Magana, RN  Outcome: Progressing  8/6/2024 0210 by Franchesca Stallings, RN  Outcome: Progressing     Problem: Safety - Adult  Goal: Free from fall injury  8/6/2024 1033 by Tracy Magana, RN  Outcome: Progressing  8/6/2024 0210 by Franchesca Stallings, RN  Outcome: Progressing      No

## 2024-08-06 NOTE — ACP (ADVANCE CARE PLANNING)
Advance Care Planning   Healthcare Decision Maker:    Primary Decision Maker: Dede Hillman - Spouse - [phone]    Secondary Decision Maker: GRACIE OSEGUERA - Child - [phone]    Click here to complete Healthcare Decision Makers including selection of the Healthcare Decision Maker Relationship (ie \"Primary\").

## 2024-08-07 ENCOUNTER — ANESTHESIA (OUTPATIENT)
Dept: OPERATING ROOM | Age: 74
DRG: 854 | End: 2024-08-07
Payer: MEDICARE

## 2024-08-07 ENCOUNTER — ANESTHESIA EVENT (OUTPATIENT)
Dept: OPERATING ROOM | Age: 74
DRG: 854 | End: 2024-08-07
Payer: MEDICARE

## 2024-08-07 ENCOUNTER — APPOINTMENT (OUTPATIENT)
Dept: GENERAL RADIOLOGY | Age: 74
DRG: 463 | End: 2024-08-07
Attending: STUDENT IN AN ORGANIZED HEALTH CARE EDUCATION/TRAINING PROGRAM
Payer: MEDICARE

## 2024-08-07 LAB
ALBUMIN SERPL-MCNC: 3.5 G/DL (ref 3.5–5.2)
ALP SERPL-CCNC: 63 U/L (ref 40–129)
ALT SERPL-CCNC: 8 U/L (ref 0–40)
ANION GAP SERPL CALCULATED.3IONS-SCNC: 11 MMOL/L (ref 7–16)
AST SERPL-CCNC: 18 U/L (ref 0–39)
BASOPHILS # BLD: 0.01 K/UL (ref 0–0.2)
BASOPHILS NFR BLD: 0 % (ref 0–2)
BILIRUB SERPL-MCNC: 0.9 MG/DL (ref 0–1.2)
BUN SERPL-MCNC: 20 MG/DL (ref 6–23)
CALCIUM SERPL-MCNC: 8.3 MG/DL (ref 8.6–10.2)
CHLORIDE SERPL-SCNC: 101 MMOL/L (ref 98–107)
CO2 SERPL-SCNC: 24 MMOL/L (ref 22–29)
CREAT SERPL-MCNC: 1.1 MG/DL (ref 0.7–1.2)
CRP SERPL HS-MCNC: 265 MG/L (ref 0–5)
EOSINOPHIL # BLD: 0.02 K/UL (ref 0.05–0.5)
EOSINOPHILS RELATIVE PERCENT: 0 % (ref 0–6)
ERYTHROCYTE [DISTWIDTH] IN BLOOD BY AUTOMATED COUNT: 13.5 % (ref 11.5–15)
GFR, ESTIMATED: 69 ML/MIN/1.73M2
GLUCOSE SERPL-MCNC: 99 MG/DL (ref 74–99)
HCT VFR BLD AUTO: 37.9 % (ref 37–54)
HGB BLD-MCNC: 12.2 G/DL (ref 12.5–16.5)
IMM GRANULOCYTES # BLD AUTO: 0.06 K/UL (ref 0–0.58)
IMM GRANULOCYTES NFR BLD: 1 % (ref 0–5)
INR PPP: 1.3
LYMPHOCYTES NFR BLD: 1.06 K/UL (ref 1.5–4)
LYMPHOCYTES RELATIVE PERCENT: 10 % (ref 20–42)
MAGNESIUM SERPL-MCNC: 2.2 MG/DL (ref 1.6–2.6)
MCH RBC QN AUTO: 28.9 PG (ref 26–35)
MCHC RBC AUTO-ENTMCNC: 32.2 G/DL (ref 32–34.5)
MCV RBC AUTO: 89.8 FL (ref 80–99.9)
MONOCYTES NFR BLD: 0.81 K/UL (ref 0.1–0.95)
MONOCYTES NFR BLD: 8 % (ref 2–12)
NEUTROPHILS NFR BLD: 81 % (ref 43–80)
NEUTS SEG NFR BLD: 8.42 K/UL (ref 1.8–7.3)
PLATELET, FLUORESCENCE: 123 K/UL (ref 130–450)
PMV BLD AUTO: 10.9 FL (ref 7–12)
POTASSIUM SERPL-SCNC: 3.3 MMOL/L (ref 3.5–5)
PROT SERPL-MCNC: 6.5 G/DL (ref 6.4–8.3)
PROTHROMBIN TIME: 14 SEC (ref 9.3–12.4)
RBC # BLD AUTO: 4.22 M/UL (ref 3.8–5.8)
SODIUM SERPL-SCNC: 136 MMOL/L (ref 132–146)
WBC OTHER # BLD: 10.4 K/UL (ref 4.5–11.5)

## 2024-08-07 PROCEDURE — 80053 COMPREHEN METABOLIC PANEL: CPT

## 2024-08-07 PROCEDURE — 2720000010 HC SURG SUPPLY STERILE: Performed by: ORTHOPAEDIC SURGERY

## 2024-08-07 PROCEDURE — 6360000002 HC RX W HCPCS: Performed by: REGISTERED NURSE

## 2024-08-07 PROCEDURE — 2500000003 HC RX 250 WO HCPCS: Performed by: NURSE ANESTHETIST, CERTIFIED REGISTERED

## 2024-08-07 PROCEDURE — 85610 PROTHROMBIN TIME: CPT

## 2024-08-07 PROCEDURE — 87077 CULTURE AEROBIC IDENTIFY: CPT

## 2024-08-07 PROCEDURE — 87015 SPECIMEN INFECT AGNT CONCNTJ: CPT

## 2024-08-07 PROCEDURE — 7100000001 HC PACU RECOVERY - ADDTL 15 MIN: Performed by: ORTHOPAEDIC SURGERY

## 2024-08-07 PROCEDURE — 87205 SMEAR GRAM STAIN: CPT

## 2024-08-07 PROCEDURE — 73501 X-RAY EXAM HIP UNI 1 VIEW: CPT

## 2024-08-07 PROCEDURE — 87075 CULTR BACTERIA EXCEPT BLOOD: CPT

## 2024-08-07 PROCEDURE — 2580000003 HC RX 258: Performed by: REGISTERED NURSE

## 2024-08-07 PROCEDURE — 3600000015 HC SURGERY LEVEL 5 ADDTL 15MIN: Performed by: ORTHOPAEDIC SURGERY

## 2024-08-07 PROCEDURE — 6370000000 HC RX 637 (ALT 250 FOR IP): Performed by: ORTHOPAEDIC SURGERY

## 2024-08-07 PROCEDURE — 2500000003 HC RX 250 WO HCPCS: Performed by: ORTHOPAEDIC SURGERY

## 2024-08-07 PROCEDURE — 0SP909Z REMOVAL OF LINER FROM RIGHT HIP JOINT, OPEN APPROACH: ICD-10-PCS | Performed by: ORTHOPAEDIC SURGERY

## 2024-08-07 PROCEDURE — 7100000000 HC PACU RECOVERY - FIRST 15 MIN: Performed by: ORTHOPAEDIC SURGERY

## 2024-08-07 PROCEDURE — 86140 C-REACTIVE PROTEIN: CPT

## 2024-08-07 PROCEDURE — 89051 BODY FLUID CELL COUNT: CPT

## 2024-08-07 PROCEDURE — 1200000000 HC SEMI PRIVATE

## 2024-08-07 PROCEDURE — 6360000002 HC RX W HCPCS: Performed by: NURSE ANESTHETIST, CERTIFIED REGISTERED

## 2024-08-07 PROCEDURE — C1776 JOINT DEVICE (IMPLANTABLE): HCPCS | Performed by: ORTHOPAEDIC SURGERY

## 2024-08-07 PROCEDURE — C1713 ANCHOR/SCREW BN/BN,TIS/BN: HCPCS | Performed by: ORTHOPAEDIC SURGERY

## 2024-08-07 PROCEDURE — 99232 SBSQ HOSP IP/OBS MODERATE 35: CPT | Performed by: STUDENT IN AN ORGANIZED HEALTH CARE EDUCATION/TRAINING PROGRAM

## 2024-08-07 PROCEDURE — 83735 ASSAY OF MAGNESIUM: CPT

## 2024-08-07 PROCEDURE — 2580000003 HC RX 258: Performed by: NURSE ANESTHETIST, CERTIFIED REGISTERED

## 2024-08-07 PROCEDURE — 6360000002 HC RX W HCPCS: Performed by: ORTHOPAEDIC SURGERY

## 2024-08-07 PROCEDURE — 3600000005 HC SURGERY LEVEL 5 BASE: Performed by: ORTHOPAEDIC SURGERY

## 2024-08-07 PROCEDURE — 6370000000 HC RX 637 (ALT 250 FOR IP): Performed by: NURSE PRACTITIONER

## 2024-08-07 PROCEDURE — 3E0U029 INTRODUCTION OF OTHER ANTI-INFECTIVE INTO JOINTS, OPEN APPROACH: ICD-10-PCS | Performed by: ORTHOPAEDIC SURGERY

## 2024-08-07 PROCEDURE — 88300 SURGICAL PATH GROSS: CPT

## 2024-08-07 PROCEDURE — 0SUA09Z SUPPLEMENT RIGHT HIP JOINT, ACETABULAR SURFACE WITH LINER, OPEN APPROACH: ICD-10-PCS | Performed by: ORTHOPAEDIC SURGERY

## 2024-08-07 PROCEDURE — 0SRR01Z REPLACEMENT OF RIGHT HIP JOINT, FEMORAL SURFACE WITH METAL SYNTHETIC SUBSTITUTE, OPEN APPROACH: ICD-10-PCS | Performed by: ORTHOPAEDIC SURGERY

## 2024-08-07 PROCEDURE — 87206 SMEAR FLUORESCENT/ACID STAI: CPT

## 2024-08-07 PROCEDURE — 36415 COLL VENOUS BLD VENIPUNCTURE: CPT

## 2024-08-07 PROCEDURE — 87176 TISSUE HOMOGENIZATION CULTR: CPT

## 2024-08-07 PROCEDURE — 87070 CULTURE OTHR SPECIMN AEROBIC: CPT

## 2024-08-07 PROCEDURE — 3700000001 HC ADD 15 MINUTES (ANESTHESIA): Performed by: ORTHOPAEDIC SURGERY

## 2024-08-07 PROCEDURE — 85025 COMPLETE CBC W/AUTO DIFF WBC: CPT

## 2024-08-07 PROCEDURE — 87102 FUNGUS ISOLATION CULTURE: CPT

## 2024-08-07 PROCEDURE — 2709999900 HC NON-CHARGEABLE SUPPLY: Performed by: ORTHOPAEDIC SURGERY

## 2024-08-07 PROCEDURE — 87116 MYCOBACTERIA CULTURE: CPT

## 2024-08-07 PROCEDURE — 3700000000 HC ANESTHESIA ATTENDED CARE: Performed by: ORTHOPAEDIC SURGERY

## 2024-08-07 PROCEDURE — 0SPR0JZ REMOVAL OF SYNTHETIC SUBSTITUTE FROM RIGHT HIP JOINT, FEMORAL SURFACE, OPEN APPROACH: ICD-10-PCS | Performed by: ORTHOPAEDIC SURGERY

## 2024-08-07 DEVICE — IMPLANTABLE DEVICE: Type: IMPLANTABLE DEVICE | Site: HIP | Status: FUNCTIONAL

## 2024-08-07 DEVICE — STIMULAN® RAPID CURE PROVIDED STERILE FOR SINGLE PATIENT USE. STIMULAN® RAPID CURE CONTAINS CALCIUM SULFATE POWDER AND MIXING SOLUTION IN PRE-MEASURED QUANTITIES SO THAT WHEN MIXED TOGETHER IN A STERILE MIXING BOWL, THE RESULTANT PASTE IS TO BE DIGITALLY PACKED INTO OPEN BONE VOID/GAP TO SET INSITU OR PLACED INTO THE MOULD PROVIDED, THE MIXTURE SETS TO FORM BEADS. THE BIODEGRADABLE, RADIOPAQUE BEADS ARE RESORBED IN APPROXIMATELY 30 – 60 DAYS WHEN USED IN ACCORDANCE WITH THE DEVICE LABELLING. STIMULAN® RAPID CURE IS MANUFACTURED FROM SYNTHETIC IMPLANT GRADE CALCIUM SULFATE DIHYDRATE(CASO4.2H2O) THAT RESORBS AND IS REPLACED WITH BONE DURING THE HEALING PROCESS. ALSO, AS THE BONE VOID FILLER BEADS ARE BIODEGRADABLE AND BIOCOMPATIBLE, THEY MAY BE USED AT AN INFECTED SITE.
Type: IMPLANTABLE DEVICE | Site: HIP | Status: FUNCTIONAL
Brand: STIMULAN® RAPID CURE

## 2024-08-07 RX ORDER — SODIUM CHLORIDE 0.9 % (FLUSH) 0.9 %
5-40 SYRINGE (ML) INJECTION PRN
Status: DISCONTINUED | OUTPATIENT
Start: 2024-08-07 | End: 2024-08-07 | Stop reason: HOSPADM

## 2024-08-07 RX ORDER — FENTANYL CITRATE 50 UG/ML
INJECTION, SOLUTION INTRAMUSCULAR; INTRAVENOUS PRN
Status: DISCONTINUED | OUTPATIENT
Start: 2024-08-07 | End: 2024-08-07 | Stop reason: SDUPTHER

## 2024-08-07 RX ORDER — SODIUM CHLORIDE, SODIUM LACTATE, POTASSIUM CHLORIDE, CALCIUM CHLORIDE 600; 310; 30; 20 MG/100ML; MG/100ML; MG/100ML; MG/100ML
INJECTION, SOLUTION INTRAVENOUS CONTINUOUS PRN
Status: DISCONTINUED | OUTPATIENT
Start: 2024-08-07 | End: 2024-08-07 | Stop reason: SDUPTHER

## 2024-08-07 RX ORDER — PROPOFOL 10 MG/ML
INJECTION, EMULSION INTRAVENOUS PRN
Status: DISCONTINUED | OUTPATIENT
Start: 2024-08-07 | End: 2024-08-07 | Stop reason: SDUPTHER

## 2024-08-07 RX ORDER — SODIUM CHLORIDE 9 MG/ML
INJECTION, SOLUTION INTRAVENOUS PRN
Status: DISCONTINUED | OUTPATIENT
Start: 2024-08-07 | End: 2024-08-07 | Stop reason: HOSPADM

## 2024-08-07 RX ORDER — ROCURONIUM BROMIDE 10 MG/ML
INJECTION, SOLUTION INTRAVENOUS PRN
Status: DISCONTINUED | OUTPATIENT
Start: 2024-08-07 | End: 2024-08-07 | Stop reason: SDUPTHER

## 2024-08-07 RX ORDER — LIDOCAINE HYDROCHLORIDE 10 MG/ML
50 INJECTION, SOLUTION EPIDURAL; INFILTRATION; INTRACAUDAL; PERINEURAL ONCE
Status: DISCONTINUED | OUTPATIENT
Start: 2024-08-07 | End: 2024-08-09 | Stop reason: HOSPADM

## 2024-08-07 RX ORDER — EPHEDRINE SULFATE 50 MG/ML
INJECTION INTRAVENOUS PRN
Status: DISCONTINUED | OUTPATIENT
Start: 2024-08-07 | End: 2024-08-07 | Stop reason: SDUPTHER

## 2024-08-07 RX ORDER — SODIUM CHLORIDE 9 MG/ML
INJECTION, SOLUTION INTRAVENOUS PRN
Status: DISCONTINUED | OUTPATIENT
Start: 2024-08-07 | End: 2024-08-08 | Stop reason: SDUPTHER

## 2024-08-07 RX ORDER — ONDANSETRON 2 MG/ML
INJECTION INTRAMUSCULAR; INTRAVENOUS PRN
Status: DISCONTINUED | OUTPATIENT
Start: 2024-08-07 | End: 2024-08-07 | Stop reason: SDUPTHER

## 2024-08-07 RX ORDER — SODIUM CHLORIDE 0.9 % (FLUSH) 0.9 %
5-40 SYRINGE (ML) INJECTION EVERY 12 HOURS SCHEDULED
Status: DISCONTINUED | OUTPATIENT
Start: 2024-08-07 | End: 2024-08-09 | Stop reason: HOSPADM

## 2024-08-07 RX ORDER — LIDOCAINE HYDROCHLORIDE 20 MG/ML
INJECTION, SOLUTION EPIDURAL; INFILTRATION; INTRACAUDAL; PERINEURAL PRN
Status: DISCONTINUED | OUTPATIENT
Start: 2024-08-07 | End: 2024-08-07 | Stop reason: SDUPTHER

## 2024-08-07 RX ORDER — DEXAMETHASONE SODIUM PHOSPHATE 4 MG/ML
INJECTION, SOLUTION INTRA-ARTICULAR; INTRALESIONAL; INTRAMUSCULAR; INTRAVENOUS; SOFT TISSUE PRN
Status: DISCONTINUED | OUTPATIENT
Start: 2024-08-07 | End: 2024-08-07 | Stop reason: SDUPTHER

## 2024-08-07 RX ORDER — TRANEXAMIC ACID 10 MG/ML
1000 INJECTION, SOLUTION INTRAVENOUS
Status: COMPLETED | OUTPATIENT
Start: 2024-08-07 | End: 2024-08-07

## 2024-08-07 RX ORDER — SODIUM CHLORIDE 9 MG/ML
INJECTION, SOLUTION INTRAVENOUS CONTINUOUS PRN
Status: DISCONTINUED | OUTPATIENT
Start: 2024-08-07 | End: 2024-08-07 | Stop reason: SDUPTHER

## 2024-08-07 RX ORDER — SODIUM CHLORIDE 0.9 % (FLUSH) 0.9 %
5-40 SYRINGE (ML) INJECTION EVERY 12 HOURS SCHEDULED
Status: DISCONTINUED | OUTPATIENT
Start: 2024-08-07 | End: 2024-08-07 | Stop reason: HOSPADM

## 2024-08-07 RX ORDER — TOBRAMYCIN 1.2 G/30ML
INJECTION, POWDER, LYOPHILIZED, FOR SOLUTION INTRAVENOUS PRN
Status: DISCONTINUED | OUTPATIENT
Start: 2024-08-07 | End: 2024-08-07 | Stop reason: ALTCHOICE

## 2024-08-07 RX ORDER — SODIUM CHLORIDE 0.9 % (FLUSH) 0.9 %
5-40 SYRINGE (ML) INJECTION PRN
Status: DISCONTINUED | OUTPATIENT
Start: 2024-08-07 | End: 2024-08-09 | Stop reason: HOSPADM

## 2024-08-07 RX ORDER — NALOXONE HYDROCHLORIDE 0.4 MG/ML
INJECTION, SOLUTION INTRAMUSCULAR; INTRAVENOUS; SUBCUTANEOUS PRN
Status: DISCONTINUED | OUTPATIENT
Start: 2024-08-07 | End: 2024-08-07 | Stop reason: HOSPADM

## 2024-08-07 RX ORDER — PROCHLORPERAZINE EDISYLATE 5 MG/ML
5 INJECTION INTRAMUSCULAR; INTRAVENOUS
Status: DISCONTINUED | OUTPATIENT
Start: 2024-08-07 | End: 2024-08-07 | Stop reason: HOSPADM

## 2024-08-07 RX ORDER — VANCOMYCIN HYDROCHLORIDE 1 G/20ML
INJECTION, POWDER, LYOPHILIZED, FOR SOLUTION INTRAVENOUS PRN
Status: DISCONTINUED | OUTPATIENT
Start: 2024-08-07 | End: 2024-08-07 | Stop reason: ALTCHOICE

## 2024-08-07 RX ADMIN — FENTANYL CITRATE 50 MCG: 50 INJECTION, SOLUTION INTRAMUSCULAR; INTRAVENOUS at 16:57

## 2024-08-07 RX ADMIN — Medication 2500 UNITS: at 09:04

## 2024-08-07 RX ADMIN — HYDROCODONE BITARTRATE AND ACETAMINOPHEN 1 TABLET: 5; 325 TABLET ORAL at 09:04

## 2024-08-07 RX ADMIN — SODIUM CHLORIDE: 9 INJECTION, SOLUTION INTRAVENOUS at 16:53

## 2024-08-07 RX ADMIN — WATER 2000 MG: 1 INJECTION INTRAMUSCULAR; INTRAVENOUS; SUBCUTANEOUS at 18:06

## 2024-08-07 RX ADMIN — SODIUM CHLORIDE, PRESERVATIVE FREE 10 ML: 5 INJECTION INTRAVENOUS at 21:15

## 2024-08-07 RX ADMIN — ROCURONIUM BROMIDE 50 MG: 10 INJECTION, SOLUTION INTRAVENOUS at 16:57

## 2024-08-07 RX ADMIN — SODIUM CHLORIDE, POTASSIUM CHLORIDE, SODIUM LACTATE AND CALCIUM CHLORIDE: 600; 310; 30; 20 INJECTION, SOLUTION INTRAVENOUS at 18:46

## 2024-08-07 RX ADMIN — SERTRALINE HYDROCHLORIDE 50 MG: 50 TABLET ORAL at 09:03

## 2024-08-07 RX ADMIN — LOSARTAN POTASSIUM 100 MG: 50 TABLET, FILM COATED ORAL at 09:03

## 2024-08-07 RX ADMIN — SUCRALFATE 1 G: 1 TABLET ORAL at 21:06

## 2024-08-07 RX ADMIN — MAGNESIUM OXIDE 400 MG (241.3 MG MAGNESIUM) TABLET 400 MG: TABLET at 09:03

## 2024-08-07 RX ADMIN — ROSUVASTATIN CALCIUM 40 MG: 20 TABLET, FILM COATED ORAL at 21:05

## 2024-08-07 RX ADMIN — DEXAMETHASONE SODIUM PHOSPHATE 10 MG: 4 INJECTION, SOLUTION INTRAMUSCULAR; INTRAVENOUS at 17:06

## 2024-08-07 RX ADMIN — SUCRALFATE 1 G: 1 TABLET ORAL at 09:03

## 2024-08-07 RX ADMIN — ZINC SULFATE 220 MG (50 MG) CAPSULE 50 MG: CAPSULE at 21:07

## 2024-08-07 RX ADMIN — ONDANSETRON 4 MG: 2 INJECTION INTRAMUSCULAR; INTRAVENOUS at 17:06

## 2024-08-07 RX ADMIN — PROPOFOL 200 MG: 10 INJECTION, EMULSION INTRAVENOUS at 16:57

## 2024-08-07 RX ADMIN — TRANEXAMIC ACID 1000 MG: 10 INJECTION, SOLUTION INTRAVENOUS at 20:05

## 2024-08-07 RX ADMIN — POTASSIUM CHLORIDE 40 MEQ: 1500 TABLET, EXTENDED RELEASE ORAL at 12:27

## 2024-08-07 RX ADMIN — FENTANYL CITRATE 50 MCG: 50 INJECTION, SOLUTION INTRAMUSCULAR; INTRAVENOUS at 17:12

## 2024-08-07 RX ADMIN — LIDOCAINE HYDROCHLORIDE 100 MG: 20 INJECTION, SOLUTION EPIDURAL; INFILTRATION; INTRACAUDAL; PERINEURAL at 16:57

## 2024-08-07 RX ADMIN — SUCRALFATE 1 G: 1 TABLET ORAL at 12:30

## 2024-08-07 RX ADMIN — AMLODIPINE BESYLATE 5 MG: 5 TABLET ORAL at 21:07

## 2024-08-07 RX ADMIN — EPHEDRINE SULFATE 10 MG: 50 INJECTION, SOLUTION INTRAVENOUS at 17:57

## 2024-08-07 RX ADMIN — ROCURONIUM BROMIDE 20 MG: 10 INJECTION, SOLUTION INTRAVENOUS at 17:57

## 2024-08-07 RX ADMIN — TRANEXAMIC ACID 1000 MG: 10 INJECTION, SOLUTION INTRAVENOUS at 16:53

## 2024-08-07 RX ADMIN — HYDROCODONE BITARTRATE AND ACETAMINOPHEN 1 TABLET: 5; 325 TABLET ORAL at 21:06

## 2024-08-07 RX ADMIN — DOCUSATE SODIUM 100 MG: 100 CAPSULE, LIQUID FILLED ORAL at 21:07

## 2024-08-07 ASSESSMENT — PAIN SCALES - GENERAL
PAINLEVEL_OUTOF10: 5
PAINLEVEL_OUTOF10: 5

## 2024-08-07 ASSESSMENT — PAIN - FUNCTIONAL ASSESSMENT: PAIN_FUNCTIONAL_ASSESSMENT: 0-10

## 2024-08-07 ASSESSMENT — LIFESTYLE VARIABLES: SMOKING_STATUS: 0

## 2024-08-07 NOTE — PROGRESS NOTES
Ortho  Pain improving  /70   Pulse 56   Temp 99.9 °F (37.7 °C) (Oral)   Resp 16   Wt 100.9 kg (222 lb 7.1 oz)   SpO2 97%   BMI 30.16 kg/m²   + pain in groin with active rom  L touch intact  5/5 DF/PF  Skin intact    Labs:  WBC 10 now    Aspiration:  few gram + cocci    A/P  Acute septic right total hip arthroplasty     Plan for surgery today.  NPO  R/B/A/O/C/C reviewed with him re:  Right total hip arthroplasty irrigation and debridement with head and liner exchange and any indicated procedures.  Patient understands, voiced excellent informed consent, and wishes to proceed.  Consult infectious disease.    Not septic. Did not start Abx yet.   Further labs pending  Won Jones MD

## 2024-08-07 NOTE — BRIEF OP NOTE
Brief Postoperative Note      Patient: Edilberto Hillman  YOB: 1950  MRN: [id number]    Date of Procedure: 8/7/2024    Pre-Op Diagnosis Codes:     * Septic arthritis, due to unspecified organism, septic arthritis of unspecified location (HCC) [M00.9]    Post-Op Diagnosis: Same with concern for possible extension of infection into the psoas muscle region       Procedure(s):  RIGHT HIP TOTAL ARTHROPLASTY IRRIGATION AND DEBRIDEMENT  WITH HEAD AND LINER EXCHANGE   ++LINDY DEPUY++            ++AVAILABLE 1300++    Surgeon(s):  Won Jones MD    Assistant:  Franchesca Sánchez pa-c    Anesthesia: General    Estimated Blood Loss (mL): less than 100     Complications: None    Specimens:   * No specimens in log *    Implants:  Implant Name Type Inv. Item Serial No.  Lot No. LRB No. Used Action   GRAFT BNE SUB 10CC BEAD 25CC CA SULPHATE RAP SET W/ INDIV - [id number]  GRAFT BNE SUB 10CC BEAD 25CC CA SULPHATE RAP SET W/ INDIV  [employer] [id number] 1 Implanted         Drains: * No LDAs found *    Findings:  Infection Present At Time Of Surgery (PATOS) (choose all levels that have infection present):  - Deep Infection (muscle/fascia) present as evidenced by yellow thick fluid consistent with infection      Other Findings: Concern for medial extension of infection toward the psoas muscle region through on of the acetabular shell holes      Electronically signed by Won Jones MD on 8/7/2024 at 4:12 PM

## 2024-08-07 NOTE — PLAN OF CARE
Problem: Discharge Planning  Goal: Discharge to home or other facility with appropriate resources  8/6/2024 2224 by Deepa Ramírez, RN  Outcome: Progressing  Flowsheets (Taken 8/6/2024 2222)  Discharge to home or other facility with appropriate resources:   Identify barriers to discharge with patient and caregiver   Arrange for needed discharge resources and transportation as appropriate   Identify discharge learning needs (meds, wound care, etc)   Refer to discharge planning if patient needs post-hospital services based on physician order or complex needs related to functional status, cognitive ability or social support system     Problem: Safety - Adult  Goal: Free from fall injury  8/7/2024 1131 by Jeff Calvo, RN  Outcome: Progressing  8/6/2024 2224 by Deepa Ramírez, RN  Outcome: Progressing

## 2024-08-07 NOTE — PROGRESS NOTES
[facility] Hospitalist Progress Note    Admitting Date and Time: 8/5/2024  9:50 PM  Admit Dx: Septic arthritis (HCC) [M00.9]    Subjective:  Patient is being followed for Septic arthritis (HCC) [M00.9]   Pt feels okay  Per RN: no additional concerns    ROS: denies fever, chills, cp, sob, n/v, HA unless otherwise noted above     sodium chloride flush  5-40 mL IntraVENous 2 times per day    enoxaparin  40 mg SubCUTAneous Daily    amLODIPine  5 mg Oral Nightly    docusate sodium  100 mg Oral Daily    magnesium oxide  400 mg Oral Daily    losartan  100 mg Oral Daily    rosuvastatin  40 mg Oral Nightly    sertraline  50 mg Oral Daily    sucralfate  1 g Oral 4x Daily    Vitamin D  2,500 Units Oral Daily    zinc sulfate  50 mg Oral Daily     morphine, 2 mg, Q3H PRN  HYDROcodone-acetaminophen, 1 tablet, Q4H PRN  HYDROcodone 5 mg - acetaminophen, 1 tablet, Q4H PRN  sodium chloride flush, 5-40 mL, PRN  sodium chloride, , PRN  potassium chloride, 40 mEq, PRN   Or  potassium alternative oral replacement, 40 mEq, PRN   Or  potassium chloride, 10 mEq, PRN  magnesium sulfate, 2,000 mg, PRN  ondansetron, 4 mg, Q8H PRN   Or  ondansetron, 4 mg, Q6H PRN  polyethylene glycol, 17 g, Daily PRN  acetaminophen, 650 mg, Q6H PRN   Or  acetaminophen, 650 mg, Q6H PRN  pantoprazole, 40 mg, Daily PRN         Objective:  /70   Pulse 56   Temp 99.9 °F (37.7 °C) (Oral)   Resp 16   Wt 100.9 kg (222 lb 7.1 oz)   SpO2 97%   BMI 30.16 kg/m²     General Appearance: alert and oriented to person, place and time and in NAD, laying in bed with ice pack on forehead  Skin: warm and dry  Head: normocephalic and atraumatic  Eyes: PERRL, EOMI, conjunctivae normal  Neck: neck supple, trachea midline   Pulmonary/Chest: CTAB, no w/r/r, normal air movement, no respiratory distress  Cardiovascular: RRR, no murmurs  Abdomen: soft, non-tender, non-distended, normal bowel sounds, no masses or organomegaly  Extremities: no cyanosis, no clubbing and

## 2024-08-07 NOTE — ANESTHESIA PRE PROCEDURE
input(s): \"POCGLU\", \"POCNA\", \"POCK\", \"POCCL\", \"POCBUN\", \"POCHEMO\", \"POCHCT\" in the last 72 hours.    Coags:   Lab Results   Component Value Date/Time    PROTIME 14.0 08/07/2024 09:37 AM    INR 1.3 08/07/2024 09:37 AM       HCG (If Applicable): No results found for: \"PREGTESTUR\", \"PREGSERUM\", \"HCG\", \"HCGQUANT\"     ABGs: No results found for: \"PHART\", \"PO2ART\", \"LLN7AOE\", \"KRO5LLC\", \"BEART\", \"S9UEJEHI\"     Type & Screen (If Applicable):  No results found for: \"LABABO\"    Drug/Infectious Status (If Applicable):  No results found for: \"HIV\", \"HEPCAB\"    COVID-19 Screening (If Applicable):   Lab Results   Component Value Date/Time    COVID19 Not Detected 08/05/2024 02:09 PM    COVID19 Not Detected 05/18/2020 12:00 PM           Anesthesia Evaluation  Patient summary reviewed and Nursing notes reviewed   no history of anesthetic complications:   Airway: Mallampati: II  TM distance: >3 FB   Neck ROM: full  Mouth opening: > = 3 FB   Dental:          Pulmonary:Negative Pulmonary ROS breath sounds clear to auscultation      (-) not a current smoker                           Cardiovascular:  Exercise tolerance: good (>4 METS)  (+) hypertension:, CAD:, CABG/stent (stent 2011): no interval change, hyperlipidemia        Rhythm: regular  Rate: normal           Beta Blocker:  Not on Beta Blocker         Neuro/Psych:   (+) neuromuscular disease:depression/anxiety              ROS comment: NEUROPATHY FEET GI/Hepatic/Renal:   (+) GERD: well controlled          Endo/Other:    (+) : arthritis: OA., malignancy/cancer (appendiceal ca s/p hemicolectomy).                 Abdominal:             Vascular: negative vascular ROS.         Other Findings:           Anesthesia Plan      general     ASA 3       Induction: intravenous.    MIPS: Postoperative opioids intended and Prophylactic antiemetics administered.  Anesthetic plan and risks discussed with patient and spouse.      Plan discussed with CRNA and surgical

## 2024-08-07 NOTE — CARE COORDINATION
Transition of Care-plan for today is :Right total hip arthroplasty irrigation and debridement with head and liner exchange and any indicated procedures. \". Right hip effusion was drained yesterday in IR. Cultures pending, ID consulted and following, Ancef ordered pre-op x1. Discharge plan to be determined after surgery-CM/SW following. Patient is from home with wife, independent prior to admission. Patient provided with SNF and HHC list in event he needs additional help after discharge.    Jeanna JAYN, RN  [facility]

## 2024-08-07 NOTE — PLAN OF CARE
Problem: Discharge Planning  Goal: Discharge to home or other facility with appropriate resources  8/6/2024 2224 by Deepa Ramírez, RN  Outcome: Progressing  Flowsheets (Taken 8/6/2024 2222)  Discharge to home or other facility with appropriate resources:   Identify barriers to discharge with patient and caregiver   Arrange for needed discharge resources and transportation as appropriate   Identify discharge learning needs (meds, wound care, etc)   Refer to discharge planning if patient needs post-hospital services based on physician order or complex needs related to functional status, cognitive ability or social support system  8/6/2024 1033 by Tracy Magana, RN  Outcome: Progressing     Problem: Safety - Adult  Goal: Free from fall injury  8/6/2024 2224 by Deepa Ramírez, RN  Outcome: Progressing  8/6/2024 1033 by Tracy Magana, RN  Outcome: Progressing

## 2024-08-08 LAB
ALBUMIN SERPL-MCNC: 3.1 G/DL (ref 3.5–5.2)
ALP SERPL-CCNC: 61 U/L (ref 40–129)
ALT SERPL-CCNC: 9 U/L (ref 0–40)
ANION GAP SERPL CALCULATED.3IONS-SCNC: 9 MMOL/L (ref 7–16)
AST SERPL-CCNC: 17 U/L (ref 0–39)
BASOPHILS # BLD: 0.01 K/UL (ref 0–0.2)
BASOPHILS NFR BLD: 0 % (ref 0–2)
BILIRUB SERPL-MCNC: 0.4 MG/DL (ref 0–1.2)
BUN SERPL-MCNC: 22 MG/DL (ref 6–23)
CALCIUM SERPL-MCNC: 8.5 MG/DL (ref 8.6–10.2)
CHLORIDE SERPL-SCNC: 104 MMOL/L (ref 98–107)
CK SERPL-CCNC: 159 U/L (ref 20–200)
CO2 SERPL-SCNC: 23 MMOL/L (ref 22–29)
CREAT SERPL-MCNC: 1 MG/DL (ref 0.7–1.2)
EKG ATRIAL RATE: 55 BPM
EKG P AXIS: 54 DEGREES
EKG P-R INTERVAL: 178 MS
EKG Q-T INTERVAL: 500 MS
EKG QRS DURATION: 102 MS
EKG QTC CALCULATION (BAZETT): 478 MS
EKG R AXIS: -36 DEGREES
EKG T AXIS: -5 DEGREES
EKG VENTRICULAR RATE: 55 BPM
EOSINOPHIL # BLD: 0.01 K/UL (ref 0.05–0.5)
EOSINOPHILS RELATIVE PERCENT: 0 % (ref 0–6)
ERYTHROCYTE [DISTWIDTH] IN BLOOD BY AUTOMATED COUNT: 13.2 % (ref 11.5–15)
GFR, ESTIMATED: 83 ML/MIN/1.73M2
GLUCOSE SERPL-MCNC: 161 MG/DL (ref 74–99)
HCT VFR BLD AUTO: 37 % (ref 37–54)
HGB BLD-MCNC: 11.9 G/DL (ref 12.5–16.5)
IMM GRANULOCYTES # BLD AUTO: 0.05 K/UL (ref 0–0.58)
IMM GRANULOCYTES NFR BLD: 1 % (ref 0–5)
LYMPHOCYTES NFR BLD: 0.36 K/UL (ref 1.5–4)
LYMPHOCYTES RELATIVE PERCENT: 5 % (ref 20–42)
MCH RBC QN AUTO: 29.4 PG (ref 26–35)
MCHC RBC AUTO-ENTMCNC: 32.2 G/DL (ref 32–34.5)
MCV RBC AUTO: 91.4 FL (ref 80–99.9)
MICROORGANISM/AGENT SPEC: NORMAL
MONOCYTES NFR BLD: 0.36 K/UL (ref 0.1–0.95)
MONOCYTES NFR BLD: 5 % (ref 2–12)
NEUTROPHILS NFR BLD: 90 % (ref 43–80)
NEUTS SEG NFR BLD: 7.09 K/UL (ref 1.8–7.3)
PLATELET # BLD AUTO: 131 K/UL (ref 130–450)
PMV BLD AUTO: 11.3 FL (ref 7–12)
POTASSIUM SERPL-SCNC: 4.1 MMOL/L (ref 3.5–5)
PROT SERPL-MCNC: 6.1 G/DL (ref 6.4–8.3)
RBC # BLD AUTO: 4.05 M/UL (ref 3.8–5.8)
RBC # BLD: NORMAL 10*6/UL
SERVICE CMNT-IMP: NORMAL
SODIUM SERPL-SCNC: 136 MMOL/L (ref 132–146)
SPECIMEN DESCRIPTION: NORMAL
WBC OTHER # BLD: 7.9 K/UL (ref 4.5–11.5)

## 2024-08-08 PROCEDURE — 97110 THERAPEUTIC EXERCISES: CPT

## 2024-08-08 PROCEDURE — 97161 PT EVAL LOW COMPLEX 20 MIN: CPT

## 2024-08-08 PROCEDURE — 1200000000 HC SEMI PRIVATE

## 2024-08-08 PROCEDURE — 6370000000 HC RX 637 (ALT 250 FOR IP): Performed by: ORTHOPAEDIC SURGERY

## 2024-08-08 PROCEDURE — 93010 ELECTROCARDIOGRAM REPORT: CPT | Performed by: INTERNAL MEDICINE

## 2024-08-08 PROCEDURE — 97530 THERAPEUTIC ACTIVITIES: CPT

## 2024-08-08 PROCEDURE — 36415 COLL VENOUS BLD VENIPUNCTURE: CPT

## 2024-08-08 PROCEDURE — 2580000003 HC RX 258: Performed by: ORTHOPAEDIC SURGERY

## 2024-08-08 PROCEDURE — 6360000002 HC RX W HCPCS: Performed by: ORTHOPAEDIC SURGERY

## 2024-08-08 PROCEDURE — 82550 ASSAY OF CK (CPK): CPT

## 2024-08-08 PROCEDURE — 80053 COMPREHEN METABOLIC PANEL: CPT

## 2024-08-08 PROCEDURE — 93005 ELECTROCARDIOGRAM TRACING: CPT | Performed by: INTERNAL MEDICINE

## 2024-08-08 PROCEDURE — 85025 COMPLETE CBC W/AUTO DIFF WBC: CPT

## 2024-08-08 PROCEDURE — 2580000003 HC RX 258: Performed by: SPECIALIST

## 2024-08-08 PROCEDURE — 6360000002 HC RX W HCPCS: Performed by: SPECIALIST

## 2024-08-08 PROCEDURE — 99222 1ST HOSP IP/OBS MODERATE 55: CPT | Performed by: STUDENT IN AN ORGANIZED HEALTH CARE EDUCATION/TRAINING PROGRAM

## 2024-08-08 PROCEDURE — 2700000000 HC OXYGEN THERAPY PER DAY

## 2024-08-08 RX ORDER — BISACODYL 5 MG/1
5 TABLET, DELAYED RELEASE ORAL DAILY
Status: DISCONTINUED | OUTPATIENT
Start: 2024-08-08 | End: 2024-08-09 | Stop reason: HOSPADM

## 2024-08-08 RX ORDER — DIPHENHYDRAMINE HCL 25 MG
25 TABLET ORAL EVERY 6 HOURS PRN
Status: DISCONTINUED | OUTPATIENT
Start: 2024-08-08 | End: 2024-08-09 | Stop reason: HOSPADM

## 2024-08-08 RX ORDER — SODIUM CHLORIDE 9 MG/ML
INJECTION, SOLUTION INTRAVENOUS PRN
Status: DISCONTINUED | OUTPATIENT
Start: 2024-08-08 | End: 2024-08-09 | Stop reason: HOSPADM

## 2024-08-08 RX ORDER — DIPHENHYDRAMINE HYDROCHLORIDE 50 MG/ML
25 INJECTION INTRAMUSCULAR; INTRAVENOUS EVERY 6 HOURS PRN
Status: DISCONTINUED | OUTPATIENT
Start: 2024-08-08 | End: 2024-08-09 | Stop reason: HOSPADM

## 2024-08-08 RX ORDER — CYCLOBENZAPRINE HCL 10 MG
10 TABLET ORAL EVERY 12 HOURS PRN
Status: DISCONTINUED | OUTPATIENT
Start: 2024-08-08 | End: 2024-08-09 | Stop reason: HOSPADM

## 2024-08-08 RX ORDER — ACETAMINOPHEN 325 MG/1
650 TABLET ORAL EVERY 6 HOURS SCHEDULED
Status: DISCONTINUED | OUTPATIENT
Start: 2024-08-08 | End: 2024-08-09 | Stop reason: HOSPADM

## 2024-08-08 RX ORDER — LIDOCAINE HYDROCHLORIDE 10 MG/ML
50 INJECTION, SOLUTION EPIDURAL; INFILTRATION; INTRACAUDAL; PERINEURAL ONCE
Status: COMPLETED | OUTPATIENT
Start: 2024-08-08 | End: 2024-08-09

## 2024-08-08 RX ORDER — SODIUM CHLORIDE 0.9 % (FLUSH) 0.9 %
5-40 SYRINGE (ML) INJECTION PRN
Status: DISCONTINUED | OUTPATIENT
Start: 2024-08-08 | End: 2024-08-09 | Stop reason: HOSPADM

## 2024-08-08 RX ORDER — DAPTOMYCIN 50 MG/ML
800 INJECTION, POWDER, LYOPHILIZED, FOR SOLUTION INTRAVENOUS DAILY
Qty: 672 ML | Refills: 0 | Status: SHIPPED | OUTPATIENT
Start: 2024-08-08 | End: 2024-08-09 | Stop reason: HOSPADM

## 2024-08-08 RX ORDER — SODIUM CHLORIDE 0.9 % (FLUSH) 0.9 %
5-40 SYRINGE (ML) INJECTION EVERY 12 HOURS SCHEDULED
Status: DISCONTINUED | OUTPATIENT
Start: 2024-08-08 | End: 2024-08-09 | Stop reason: HOSPADM

## 2024-08-08 RX ADMIN — LOSARTAN POTASSIUM 100 MG: 50 TABLET, FILM COATED ORAL at 08:00

## 2024-08-08 RX ADMIN — AMLODIPINE BESYLATE 5 MG: 5 TABLET ORAL at 20:36

## 2024-08-08 RX ADMIN — SODIUM CHLORIDE, PRESERVATIVE FREE 10 ML: 5 INJECTION INTRAVENOUS at 14:43

## 2024-08-08 RX ADMIN — BISACODYL 5 MG: 5 TABLET, COATED ORAL at 08:00

## 2024-08-08 RX ADMIN — ACETAMINOPHEN 650 MG: 325 TABLET ORAL at 06:40

## 2024-08-08 RX ADMIN — SUCRALFATE 1 G: 1 TABLET ORAL at 08:00

## 2024-08-08 RX ADMIN — SUCRALFATE 1 G: 1 TABLET ORAL at 20:36

## 2024-08-08 RX ADMIN — ACETAMINOPHEN 650 MG: 325 TABLET ORAL at 12:03

## 2024-08-08 RX ADMIN — ZINC SULFATE 220 MG (50 MG) CAPSULE 50 MG: CAPSULE at 20:36

## 2024-08-08 RX ADMIN — HYDROCODONE BITARTRATE AND ACETAMINOPHEN 1 TABLET: 10; 325 TABLET ORAL at 17:27

## 2024-08-08 RX ADMIN — WATER 2000 MG: 1 INJECTION INTRAMUSCULAR; INTRAVENOUS; SUBCUTANEOUS at 13:15

## 2024-08-08 RX ADMIN — ENOXAPARIN SODIUM 40 MG: 100 INJECTION SUBCUTANEOUS at 08:01

## 2024-08-08 RX ADMIN — SUCRALFATE 1 G: 1 TABLET ORAL at 17:27

## 2024-08-08 RX ADMIN — ACETAMINOPHEN 650 MG: 325 TABLET ORAL at 17:42

## 2024-08-08 RX ADMIN — SUCRALFATE 1 G: 1 TABLET ORAL at 12:03

## 2024-08-08 RX ADMIN — Medication 2500 UNITS: at 08:00

## 2024-08-08 RX ADMIN — SODIUM CHLORIDE, PRESERVATIVE FREE 10 ML: 5 INJECTION INTRAVENOUS at 07:59

## 2024-08-08 RX ADMIN — MAGNESIUM OXIDE 400 MG (241.3 MG MAGNESIUM) TABLET 400 MG: TABLET at 08:00

## 2024-08-08 RX ADMIN — SODIUM CHLORIDE 700 MG: 9 INJECTION INTRAMUSCULAR; INTRAVENOUS; SUBCUTANEOUS at 14:42

## 2024-08-08 RX ADMIN — POLYETHYLENE GLYCOL 3350 17 G: 17 POWDER, FOR SOLUTION ORAL at 14:48

## 2024-08-08 RX ADMIN — SODIUM CHLORIDE, PRESERVATIVE FREE 10 ML: 5 INJECTION INTRAVENOUS at 20:37

## 2024-08-08 RX ADMIN — DOCUSATE SODIUM 100 MG: 100 CAPSULE, LIQUID FILLED ORAL at 20:36

## 2024-08-08 RX ADMIN — SERTRALINE HYDROCHLORIDE 50 MG: 50 TABLET ORAL at 08:00

## 2024-08-08 ASSESSMENT — PAIN DESCRIPTION - ORIENTATION: ORIENTATION: RIGHT

## 2024-08-08 ASSESSMENT — PAIN SCALES - GENERAL
PAINLEVEL_OUTOF10: 0
PAINLEVEL_OUTOF10: 0
PAINLEVEL_OUTOF10: 7
PAINLEVEL_OUTOF10: 2

## 2024-08-08 ASSESSMENT — PAIN DESCRIPTION - LOCATION: LOCATION: LEG

## 2024-08-08 ASSESSMENT — PAIN DESCRIPTION - DESCRIPTORS: DESCRIPTORS: ACHING;DISCOMFORT;SORE

## 2024-08-08 NOTE — PROGRESS NOTES
[facility]  [facility]  Progress Note      No chief complaint on file.      SUBJECTIVE:  Patient is tolerating medications. No reported adverse drug reactions.  No nausea, vomiting, diarrhea.  Having bradycardia overnight, HR down to 30. Previously declined pacemaker  In bathroom shaving. No pain  Wife present.    Review of systems:  As stated above in the chief complaint, otherwise negative.    Medications:  Scheduled Meds:   sodium chloride flush  5-40 mL IntraVENous 2 times per day    acetaminophen  650 mg Oral 4 times per day    bisacodyl  5 mg Oral Daily    sodium chloride flush  5-40 mL IntraVENous 2 times per day    lidocaine 1 % injection  50 mg IntraDERmal Once    cefTRIAXone (ROCEPHIN) IV  2,000 mg IntraVENous Q24H    enoxaparin  40 mg SubCUTAneous Daily    amLODIPine  5 mg Oral Nightly    docusate sodium  100 mg Oral Daily    magnesium oxide  400 mg Oral Daily    losartan  100 mg Oral Daily    rosuvastatin  40 mg Oral Nightly    sertraline  50 mg Oral Daily    sucralfate  1 g Oral 4x Daily    Vitamin D  2,500 Units Oral Daily    zinc sulfate  50 mg Oral Daily     Continuous Infusions:   sodium chloride       PRN Meds:sodium chloride flush, sodium chloride, cyclobenzaprine, diphenhydrAMINE **OR** diphenhydrAMINE, sodium chloride flush, morphine, HYDROcodone-acetaminophen, HYDROcodone 5 mg - acetaminophen, potassium chloride **OR** potassium alternative oral replacement **OR** potassium chloride, magnesium sulfate, ondansetron **OR** ondansetron, polyethylene glycol, acetaminophen **OR** acetaminophen, pantoprazole    OBJECTIVE:  BP (!) 143/82   Pulse 52   Temp 97.9 °F (36.6 °C) (Oral)   Resp 16   Ht 1.829 m (6')   Wt 100.7 kg (222 lb)   SpO2 97%   BMI 30.11 kg/m²   Temp  Av °F (36.7 °C)  Min: 97.4 °F (36.3 °C)  Max: 98.7 °F (37.1 °C)  Constitutional: The patient is awake, alert, and oriented. Standing in room. Wife present.   Skin: Warm and dry. No rashes were  noted.   HEENT: Round and reactive pupils.  Moist mucous membranes.  No ulcerations or thrush.  Neck: Supple to movements.   Chest: No use of accessory muscles to breathe. Symmetrical expansion.  No wheezing, crackles or rhonchi.  Cardiovascular: S1 and S2 are rhythmic and regular. No murmurs appreciated.   Abdomen: Positive bowel sounds to auscultation. Benign to palpation. No masses felt. No hepatosplenomegaly.  Extremities: No clubbing, no cyanosis, no edema. Dry dressing or right hip  Lines: peripheral    Laboratory and Tests Review:  Lab Results   Component Value Date    WBC 7.9 08/08/2024    WBC 10.4 08/07/2024    WBC 11.6 (H) 08/06/2024    HGB 11.9 (L) 08/08/2024    HCT 37.0 08/08/2024    MCV 91.4 08/08/2024     08/08/2024     Lab Results   Component Value Date    NEUTROABS PENDING 08/08/2024    NEUTROABS 8.42 (H) 08/07/2024    NEUTROABS 9.53 (H) 08/06/2024     No results found for: \"CRPHS\"  Lab Results   Component Value Date    ALT 8 08/07/2024    AST 18 08/07/2024    ALKPHOS 63 08/07/2024    BILITOT 0.9 08/07/2024     Lab Results   Component Value Date/Time     08/07/2024 03:53 AM    K 3.3 08/07/2024 03:53 AM    K 3.8 03/25/2023 02:48 AM     08/07/2024 03:53 AM    CO2 24 08/07/2024 03:53 AM    BUN 20 08/07/2024 03:53 AM    CREATININE 1.1 08/07/2024 03:53 AM    CREATININE 1.3 08/06/2024 05:52 AM    CREATININE 1.3 08/05/2024 11:00 AM    GFRAA >60 12/16/2021 08:48 AM    LABGLOM 69 08/07/2024 03:53 AM    LABGLOM 71 04/25/2024 09:59 AM    GLUCOSE 99 08/07/2024 03:53 AM    GLUCOSE 94 10/11/2022 09:16 AM    CALCIUM 8.3 08/07/2024 03:53 AM    BILITOT 0.9 08/07/2024 03:53 AM    ALKPHOS 63 08/07/2024 03:53 AM    AST 18 08/07/2024 03:53 AM    ALT 8 08/07/2024 03:53 AM     Lab Results   Component Value Date    .0 (H) 08/07/2024    .0 (H) 08/05/2024    CRP 0.4 02/26/2015     Lab Results   Component Value Date    SEDRATE 21 (H) 08/05/2024    SEDRATE 11 02/26/2015

## 2024-08-08 NOTE — OP NOTE
[place] [address]                            OPERATIVE REPORT      PATIENT NAME: IZA ZHONG              : 1950  MED REC NO: [id number]                        ROOM: 0536  ACCOUNT NO: [id number]                       ADMIT DATE: 2024  PROVIDER: Won Jones MD      DATE OF PROCEDURE:  2024    SURGEON:  Won Jones MD    ASSISTANT:  Franchesca Sánchez PA-C.    PREOPERATIVE DIAGNOSIS:  Septic right total hip arthroplasty.    POSTOPERATIVE DIAGNOSIS:  Septic right total hip arthroplasty.    PROCEDURE:  Right total hip arthroplasty, irrigation and debridement with head and liner exchange, and placement of antibiotic beads.    INDICATIONS:  Patient is a 73-year-old male who had previous total hip arthroplasty and had a revision total hip arthroplasty in  for head and liner exchange and bone grafting of medial aspect of the acetabular shell.  The patient had done very well over the past 14 years with this.  He has not had any trouble.  However, on Monday, he had fever and chills and woke up Tuesday morning with severe pain in his right hip.  Patient has had no other issues or source of infection.  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  The patient has had no recent abdominal pain.  The patient had a hip aspiration done and had gram-positive cocci in pairs, and the patient wished to proceed with surgical management.  Therefore, the risks, benefits, alternatives, options, complications, and convalescence were thoroughly reviewed with him regarding right total hip arthroplasty, irrigation, debridement with head and liner exchange, and indicated procedures.  The patient understood these, voiced excellent informed consent, and wished to proceed with the operation.    IMPLANTS:  The patient had Linda Anatomic femoral stem with DePuy Duraloc cup.  The  was noted to be well secured.  The hip was reduced.  This was then irrigated and soaked with Irrisept solution for 1 minute time.  Further irrigated with 3 L of fluid.  10 mL of Stimulan with 1 g of vancomycin and 500 mg of tobramycin was placed within the joint space.  The posterior capsule was repaired through drill holes with a PDS suture.  The IT band was then closed with an interrupted #1 PDS suture.  Subcu was closed with running PDS suture, interrupted 2-0 PDS suture, and then Dermabond was applied.  A dry sterile dressing was placed.  The sciatic nerve had been palpated and noted to be out of the way of the posterior repair prior to closure.    The patient was placed in abduction pillow, extubated, transferred to hospital bed, and taken to recovery room in stable condition.    COUNTS:  All counts correct at the end of the case.    COMPLICATIONS:  None.    EBL:  100 mL.    FINDINGS:  Positive purulent fluid within affected joint spaces as well as through 1 hole in the acetabular shell that I feel was concerning to extend into the intraabdominal space.          FERNANDEZ SINGLETON MD      D:  08/07/2024 19:19:46     T:  08/08/2024 00:53:20     AMISH/KENDRICK  Job #:  [id number]     Doc#:  [id number]

## 2024-08-08 NOTE — PLAN OF CARE
Problem: Discharge Planning  Goal: Discharge to home or other facility with appropriate resources  8/8/2024 0345 by Deepa Ramírez RN  Outcome: Progressing  8/8/2024 0345 by Deepa Ramírez RN  Outcome: Progressing  Flowsheets (Taken 8/8/2024 0111)  Discharge to home or other facility with appropriate resources:   Identify barriers to discharge with patient and caregiver   Arrange for needed discharge resources and transportation as appropriate   Identify discharge learning needs (meds, wound care, etc)   Refer to discharge planning if patient needs post-hospital services based on physician order or complex needs related to functional status, cognitive ability or social support system     Problem: Safety - Adult  Goal: Free from fall injury  8/8/2024 0345 by Deepa Ramírez RN  Outcome: Progressing  8/8/2024 0345 by Deepa Ramírez RN  Outcome: Progressing     Problem: Pain  Goal: Verbalizes/displays adequate comfort level or baseline comfort level  8/8/2024 0345 by Deepa Ramírez RN  Outcome: Progressing  8/8/2024 0345 by Deepa Ramírez RN  Outcome: Progressing

## 2024-08-08 NOTE — PROGRESS NOTES
Physical Therapy  Facility/Department: [address] MED SURG/TELE  Physical Therapy Initial Assessment    Name: Edilberto Hillman  : 1950  MRN: [id number]  Date of Service: 2024    Attending Provider:  Justin Chaparro MD    Evaluating PT:  Ze Bryant Jr., P.T.    [facility]  Diagnosis:  Septic arthritis (HCC) [M00.9]  Pertinent PMHx/PSHx:  R ZENY  and revised   Procedure/Surgery:  24 R ZENY, I and D with head and liner exchange, placement of antibiotic beads  Precautions:  WBAT RLE, R hip posterolateral precautions, he is expected to receive a PICC line and informed him crutches are contraindicated at that time (he verbalized understanding)    SUBJECTIVE:    Pt lives with his wife and 99 y.o. mother-in-law in a 2 story home with 2 stairs and no rail to enter.  His bed and bath are on the first floor.  Pt ambulated with no AD PTA.  He states he has crutches, ww, and WC    OBJECTIVE:   Initial Evaluation  Date: 24 Treatment Short Term/ Long Term   Goals   Was pt agreeable to Eval/treatment? yes     Does pt have pain? C/o mild R hip pain with movement     Bed Mobility  Rolling: Independent  Supine to sit: Independent  Sit to supine: NA  Scooting: Independent  Independent   Transfers Sit to stand: SBA  Stand to sit: SBA  Stand pivot: SBA with ww  Independent    Ambulation   250 feet with ww SBA  350 feet with ww Independent    Stair negotiation: ascended and descended 2 steps with 1 rail SBA  2 steps with 1 rail Independent    AM-PAC 6 Clicks        BLE ROM is WFL, and R hip is WFL allowed by hip precautions.   LLE strength is grossly 4+/5 and RLE is grossly 3-/5 to 4/5.   Sensation:  Pt reports numbness and tingling to B feet due to neuropathy  Balance: sitting is Independent and standing with ww is SBA  Endurance: fair+    Therapeutic Exercises:  Pt was instructed in/performed supine exercises with the RLE: ankle PF/DF with bed sheet 2x15 reps, heel slides and quad sets  eval and treat  Diagnosis:  Septic arthritis (HCC) [M00.9]  Specific instructions for next treatment:  ROM exs, amb with ww, and stairs    Current Treatment Recommendations:     [x] Strengthening to improve independence with functional mobility   [x] ROM to improve flexibility and decrease spasm and pain which will help promote independence with functional mobility   [x] Balance Training to improve static/dynamic balance and to reduce fall risk  [x] Endurance Training to improve activity tolerance during functional mobility   [x] Transfer Training to improve safety and independence with all functional transfers   [x] Gait Training to improve gait mechanics, endurance and assess need for appropriate assistive device  [x] Stair Training in preparation for safe discharge home and/or into the community   [] Positioning to prevent skin breakdown and contractures  [] Safety and Education Training   [x] Patient/Caregiver Education   [x] HEP  [] Other     PT long term treatment goals are located in above grid    Frequency of treatments: 2-5x/week x 1-2 weeks.    Time in  07:45  Time out  08:25    Total Treatment Time 30 minutes     Evaluation Time includes thorough review of current medical information, gathering information on past medical history/social history and prior level of function, completion of standardized testing/informal observation of tasks, assessment of data and education on plan of care and goals.    CPT codes:  [x] Low Complexity PT evaluation 55555  [] Moderate Complexity PT evaluation 68397  [] High Complexity PT evaluation 79480  [] PT Re-evaluation 65246  [] Gait training 69566 ** minutes  [] Manual therapy 70285 ** minutes  [x] Therapeutic activities 66966 15 minutes  [x] Therapeutic exercises 57294 15 minutes  [] Neuromuscular reeducation 05157 ** minutes     Ze Bryant Jr., P.T.  License Number: [id number]

## 2024-08-08 NOTE — PLAN OF CARE
Problem: Safety - Adult  Goal: Free from fall injury  8/8/2024 1002 by Mary Mcgill RN  Outcome: Progressing  8/8/2024 0345 by Deepa Ramírez RN  Outcome: Progressing  8/8/2024 0345 by Deepa Ramírez RN  Outcome: Progressing     Problem: Pain  Goal: Verbalizes/displays adequate comfort level or baseline comfort level  8/8/2024 0345 by Deepa Ramírez RN  Outcome: Progressing  8/8/2024 0345 by Deepa Ramírez RN  Outcome: Progressing     Problem: ABCDS Injury Assessment  Goal: Absence of physical injury  Outcome: Progressing

## 2024-08-08 NOTE — PLAN OF CARE
Problem: Discharge Planning  Goal: Discharge to home or other facility with appropriate resources  Outcome: Progressing  Flowsheets (Taken 8/8/2024 0111)  Discharge to home or other facility with appropriate resources:   Identify barriers to discharge with patient and caregiver   Arrange for needed discharge resources and transportation as appropriate   Identify discharge learning needs (meds, wound care, etc)   Refer to discharge planning if patient needs post-hospital services based on physician order or complex needs related to functional status, cognitive ability or social support system     Problem: Safety - Adult  Goal: Free from fall injury  Outcome: Progressing     Problem: Pain  Goal: Verbalizes/displays adequate comfort level or baseline comfort level  Outcome: Progressing

## 2024-08-08 NOTE — CARE COORDINATION
Transition of Care-POD#1=Right total hip arthroplasty. PT score was 20/24. Spoke to patient and his wife-they requested [facility] as first choice, [facility] will not have any openings until the middle of next week, second choice was [facility], referral sent, also sent script to [facility] to run benefits. Patient and wife updated, agreeable to plan. Discharge plan is home with [facility].    Jeanna HERRERA, RN  [facility]

## 2024-08-08 NOTE — PROGRESS NOTES
Pt HR is drops in the 30's quite often while sleeping. Called Dr. Smiley. EKG done. Holding norco during the night while patient is sleeping and Rishi. No other orders at this time. Patient states this is normal for him.

## 2024-08-08 NOTE — PROGRESS NOTES
[facility] Hospitalist Progress Note    Admitting Date and Time: 8/5/2024  9:50 PM  Admit Dx: Septic arthritis (HCC) [M00.9]    Subjective:  Patient is being followed for Septic arthritis (HCC) [M00.9]     No acute events overnight.  Patient is on ceftriaxone and ampicillin was added by infectious disease plan for PICC line placement.  Awaiting final cultures    ROS: denies fever, chills, cp, sob, n/v, HA unless stated above.      sodium chloride flush  5-40 mL IntraVENous 2 times per day    acetaminophen  650 mg Oral 4 times per day    bisacodyl  5 mg Oral Daily    sodium chloride flush  5-40 mL IntraVENous 2 times per day    lidocaine 1 % injection  50 mg IntraDERmal Once    cefTRIAXone (ROCEPHIN) IV  2,000 mg IntraVENous Q24H    enoxaparin  40 mg SubCUTAneous Daily    amLODIPine  5 mg Oral Nightly    docusate sodium  100 mg Oral Daily    magnesium oxide  400 mg Oral Daily    losartan  100 mg Oral Daily    rosuvastatin  40 mg Oral Nightly    sertraline  50 mg Oral Daily    sucralfate  1 g Oral 4x Daily    Vitamin D  2,500 Units Oral Daily    zinc sulfate  50 mg Oral Daily     sodium chloride flush, 5-40 mL, PRN  sodium chloride, , PRN  cyclobenzaprine, 10 mg, Q12H PRN  diphenhydrAMINE, 25 mg, Q6H PRN   Or  diphenhydrAMINE, 25 mg, Q6H PRN  sodium chloride flush, 5-40 mL, PRN  morphine, 2 mg, Q3H PRN  HYDROcodone-acetaminophen, 1 tablet, Q4H PRN  HYDROcodone 5 mg - acetaminophen, 1 tablet, Q4H PRN  potassium chloride, 40 mEq, PRN   Or  potassium alternative oral replacement, 40 mEq, PRN   Or  potassium chloride, 10 mEq, PRN  magnesium sulfate, 2,000 mg, PRN  ondansetron, 4 mg, Q8H PRN   Or  ondansetron, 4 mg, Q6H PRN  polyethylene glycol, 17 g, Daily PRN  acetaminophen, 650 mg, Q6H PRN   Or  acetaminophen, 650 mg, Q6H PRN  pantoprazole, 40 mg, Daily PRN         Objective:    BP (!) 143/82   Pulse 52   Temp 97.9 °F (36.6 °C) (Oral)   Resp 16   Ht 1.829 m (6')   Wt 100.7 kg (222 lb)   SpO2 97%   BMI

## 2024-08-08 NOTE — SIGNIFICANT EVENT
Received notification regarding patient's bradycardia.  Patient stated he has history of low heart rate between 30s to 50s, mostly in the 40s, sees cardiologist, was previously offered pacemaker which patient declined.  Does not appear to be on AV cecilio medications, otherwise hemodynamically stable, symptomatic.  EKG obtained, sinus bradycardia, heart rate in the 50s.  Bradycardia was mostly noted when patient is sleeping, and resting, but has been getting pain medications.  Patient plans to follow-up with cardiologist regarding his heart rate, otherwise we will continue to monitor clinic at this time, avoid sedatives.

## 2024-08-08 NOTE — ANESTHESIA POSTPROCEDURE EVALUATION
Department of Anesthesiology  Postprocedure Note    Patient: Edilberto Hillman  MRN: [id number]  YOB: 1950  Date of evaluation: 8/7/2024    Procedure Summary       Date: 08/07/24 Room / Location: [address]    Anesthesia Start: 1653 Anesthesia Stop: 1938    Procedure: RIGHT HIP TOTAL ARTHROPLASTY IRRIGATION AND DEBRIDEMENT  WITH HEAD AND LINER EXCHANGE (Right: Hip) Diagnosis:       Septic arthritis, due to unspecified organism, septic arthritis of unspecified location (HCC)      (Septic arthritis, due to unspecified organism, septic arthritis of unspecified location (HCC) [M00.9])    Surgeons: Won Jones MD Responsible Provider: Kiarra Gurrola MD    Anesthesia Type: General ASA Status: 3            Anesthesia Type: General    Sudha Phase I: Sudha Score: 9    Sudha Phase II:      Anesthesia Post Evaluation    Patient location during evaluation: PACU  Patient participation: complete - patient participated  Level of consciousness: awake and alert  Pain score: 3  Airway patency: patent  Nausea & Vomiting: no vomiting and no nausea  Cardiovascular status: hemodynamically stable  Respiratory status: spontaneous ventilation, nonlabored ventilation and acceptable  Hydration status: stable  Pain management: adequate and satisfactory to patient        No notable events documented.

## 2024-08-09 VITALS
SYSTOLIC BLOOD PRESSURE: 151 MMHG | BODY MASS INDEX: 31.62 KG/M2 | WEIGHT: 233.47 LBS | TEMPERATURE: 97.9 F | HEART RATE: 58 BPM | DIASTOLIC BLOOD PRESSURE: 81 MMHG | HEIGHT: 72 IN | OXYGEN SATURATION: 98 % | RESPIRATION RATE: 18 BRPM

## 2024-08-09 PROBLEM — D62 ANEMIA ASSOCIATED WITH ACUTE BLOOD LOSS: Status: ACTIVE | Noted: 2024-08-09

## 2024-08-09 LAB
ERYTHROCYTE [DISTWIDTH] IN BLOOD BY AUTOMATED COUNT: 13.4 % (ref 11.5–15)
HCT VFR BLD AUTO: 34 % (ref 37–54)
HGB BLD-MCNC: 11.4 G/DL (ref 12.5–16.5)
MCH RBC QN AUTO: 29.5 PG (ref 26–35)
MCHC RBC AUTO-ENTMCNC: 33.5 G/DL (ref 32–34.5)
MCV RBC AUTO: 88.1 FL (ref 80–99.9)
PLATELET # BLD AUTO: 146 K/UL (ref 130–450)
PMV BLD AUTO: 11.1 FL (ref 7–12)
RBC # BLD AUTO: 3.86 M/UL (ref 3.8–5.8)
WBC OTHER # BLD: 11.3 K/UL (ref 4.5–11.5)

## 2024-08-09 PROCEDURE — 97110 THERAPEUTIC EXERCISES: CPT

## 2024-08-09 PROCEDURE — 99239 HOSP IP/OBS DSCHRG MGMT >30: CPT | Performed by: INTERNAL MEDICINE

## 2024-08-09 PROCEDURE — 6360000002 HC RX W HCPCS: Performed by: ORTHOPAEDIC SURGERY

## 2024-08-09 PROCEDURE — 2580000003 HC RX 258: Performed by: REGISTERED NURSE

## 2024-08-09 PROCEDURE — 02HV33Z INSERTION OF INFUSION DEVICE INTO SUPERIOR VENA CAVA, PERCUTANEOUS APPROACH: ICD-10-PCS | Performed by: STUDENT IN AN ORGANIZED HEALTH CARE EDUCATION/TRAINING PROGRAM

## 2024-08-09 PROCEDURE — 2580000003 HC RX 258: Performed by: SPECIALIST

## 2024-08-09 PROCEDURE — 6370000000 HC RX 637 (ALT 250 FOR IP): Performed by: ORTHOPAEDIC SURGERY

## 2024-08-09 PROCEDURE — 6360000002 HC RX W HCPCS: Performed by: REGISTERED NURSE

## 2024-08-09 PROCEDURE — C1751 CATH, INF, PER/CENT/MIDLINE: HCPCS

## 2024-08-09 PROCEDURE — 85027 COMPLETE CBC AUTOMATED: CPT

## 2024-08-09 PROCEDURE — 36569 INSJ PICC 5 YR+ W/O IMAGING: CPT

## 2024-08-09 PROCEDURE — 2500000003 HC RX 250 WO HCPCS: Performed by: SPECIALIST

## 2024-08-09 PROCEDURE — 97530 THERAPEUTIC ACTIVITIES: CPT

## 2024-08-09 PROCEDURE — 76937 US GUIDE VASCULAR ACCESS: CPT

## 2024-08-09 RX ADMIN — SERTRALINE HYDROCHLORIDE 50 MG: 50 TABLET ORAL at 08:22

## 2024-08-09 RX ADMIN — SUCRALFATE 1 G: 1 TABLET ORAL at 08:22

## 2024-08-09 RX ADMIN — SODIUM CHLORIDE, PRESERVATIVE FREE 10 ML: 5 INJECTION INTRAVENOUS at 08:23

## 2024-08-09 RX ADMIN — LIDOCAINE HYDROCHLORIDE 10 MG: 10 SOLUTION INTRAVENOUS at 15:26

## 2024-08-09 RX ADMIN — SUCRALFATE 1 G: 1 TABLET ORAL at 15:59

## 2024-08-09 RX ADMIN — ENOXAPARIN SODIUM 40 MG: 100 INJECTION SUBCUTANEOUS at 08:22

## 2024-08-09 RX ADMIN — ACETAMINOPHEN 650 MG: 325 TABLET ORAL at 00:10

## 2024-08-09 RX ADMIN — HYDROCODONE BITARTRATE AND ACETAMINOPHEN 1 TABLET: 10; 325 TABLET ORAL at 15:44

## 2024-08-09 RX ADMIN — HYDROCODONE BITARTRATE AND ACETAMINOPHEN 1 TABLET: 10; 325 TABLET ORAL at 00:11

## 2024-08-09 RX ADMIN — MAGNESIUM OXIDE 400 MG (241.3 MG MAGNESIUM) TABLET 400 MG: TABLET at 08:22

## 2024-08-09 RX ADMIN — SODIUM CHLORIDE, PRESERVATIVE FREE 10 ML: 5 INJECTION INTRAVENOUS at 15:27

## 2024-08-09 RX ADMIN — HYDROCODONE BITARTRATE AND ACETAMINOPHEN 1 TABLET: 10; 325 TABLET ORAL at 15:45

## 2024-08-09 RX ADMIN — ACETAMINOPHEN 650 MG: 325 TABLET ORAL at 11:35

## 2024-08-09 RX ADMIN — Medication 2500 UNITS: at 08:22

## 2024-08-09 RX ADMIN — LOSARTAN POTASSIUM 100 MG: 50 TABLET, FILM COATED ORAL at 08:22

## 2024-08-09 RX ADMIN — WATER 2000 MG: 1 INJECTION INTRAMUSCULAR; INTRAVENOUS; SUBCUTANEOUS at 15:45

## 2024-08-09 RX ADMIN — BISACODYL 5 MG: 5 TABLET, COATED ORAL at 08:22

## 2024-08-09 ASSESSMENT — PAIN DESCRIPTION - LOCATION
LOCATION: HIP
LOCATION: HIP;LEG

## 2024-08-09 ASSESSMENT — PAIN DESCRIPTION - DESCRIPTORS
DESCRIPTORS: ACHING;THROBBING;SORE
DESCRIPTORS: ACHING

## 2024-08-09 ASSESSMENT — PAIN SCALES - GENERAL
PAINLEVEL_OUTOF10: 0
PAINLEVEL_OUTOF10: 0
PAINLEVEL_OUTOF10: 7
PAINLEVEL_OUTOF10: 0
PAINLEVEL_OUTOF10: 6
PAINLEVEL_OUTOF10: 0
PAINLEVEL_OUTOF10: 0
PAINLEVEL_OUTOF10: 7

## 2024-08-09 ASSESSMENT — PAIN DESCRIPTION - ORIENTATION
ORIENTATION: RIGHT
ORIENTATION: RIGHT

## 2024-08-09 ASSESSMENT — PAIN - FUNCTIONAL ASSESSMENT: PAIN_FUNCTIONAL_ASSESSMENT: ACTIVITIES ARE NOT PREVENTED

## 2024-08-09 NOTE — PROGRESS NOTES
Confirmed with [facility] pharmacy that their is a prescription for pain medication ready for him.

## 2024-08-09 NOTE — PLAN OF CARE
Problem: Discharge Planning  Goal: Discharge to home or other facility with appropriate resources  8/9/2024 1700 by Mary Mcgill RN  Outcome: Adequate for Discharge  8/9/2024 0346 by Vera Dowd RN  Outcome: Progressing     Problem: Safety - Adult  Goal: Free from fall injury  8/9/2024 1700 by Mary Mcgill RN  Outcome: Adequate for Discharge  8/9/2024 1043 by Mary Mcgill RN  Outcome: Progressing  8/9/2024 0346 by Vera Dowd RN  Outcome: Progressing  Flowsheets (Taken 8/8/2024 2015)  Free From Fall Injury: Instruct family/caregiver on patient safety     Problem: Pain  Goal: Verbalizes/displays adequate comfort level or baseline comfort level  8/9/2024 1700 by Mary Mcgill RN  Outcome: Adequate for Discharge  8/9/2024 1043 by Mary Mcgill RN  Outcome: Progressing  8/9/2024 0346 by Vera Dowd RN  Outcome: Progressing     Problem: ABCDS Injury Assessment  Goal: Absence of physical injury  8/9/2024 1700 by Mary Mcgill RN  Outcome: Adequate for Discharge  8/9/2024 1043 by Mary Mcgill RN  Outcome: Progressing  8/9/2024 0346 by Vera Dowd RN  Outcome: Progressing

## 2024-08-09 NOTE — PROGRESS NOTES
Ortho  Pain better than preop  Comforable  BP (!) 152/78   Pulse 50   Temp 97.9 °F (36.6 °C) (Oral)   Resp 17   Ht 1.829 m (6')   Wt 100.7 kg (222 lb)   SpO2 97%   BMI 30.11 kg/m²   NVI  Dsg c/d/I  5/5 DF/PF  Cx:  gram + cocci  Await final cx  A/P POD 1 right kavon I&D with head and liner exchange, placement of antibiotic beads  WBAT  Abx per ID  PICC line  Watch for any abdominal complaints/ issues    Won Jones MD

## 2024-08-09 NOTE — DISCHARGE INSTRUCTIONS
[facility]  ([facility])  [address]  [address]  [place] [address]  Phone [phone]   Fax [phone]    James No MD, FACP   MD Maddy Wills MD Indra P. Limbu, MD Eunice A. H. Wong, MD Valente Ramon, MD Davey Curry, CNS   Osvaldo Robin, APRN, CNS  Gayatri Waed, APRN-CNP    Prashanth Sarah, APRN, CNS  Lora William, APRN-CNP   Kamar Fraire MD               STANDING ORDERS (“ID Protocol”)     Visiting nurses are to write the Primary Care Physician and their own call back number on all laboratory requisition forms.   Abnormal lab values are called to the physician by the nurse and NOT by the laboratory.   Fax all labs to the office in a timely manner, during office hours. All faxes should include nurse’s name and call back number.  Vascular Access Devices or VADs (TLC, PICC, Midline, etc) will be replaced as necessary.  Draw all blood work from VADs, except for drug levels.  If unable to access a VAD, insert a peripheral catheter temporarily. Contact the Primary Care Physician or [facility] office for surgical referral.  Use tPA (Cathflo®, Alteplase®) as per agency protocol to restore patency of VAD.  Saline flush 10ml or heparin flush 10U/cc IV daily and as needed to maintain line patency.  Remove VAD upon completion of IV antibiotics, unless otherwise specified by the ordering physician.  If VAD cannot be removed, schedule appointment at office for removal.  If VAD was placed by Radiology, schedule appointment for removal.  Notify ordering physician or office if patient requires admission to the hospital with reason for admission.  Discontinue all blood work upon completion of IV antibiotics, unless otherwise specified by ordering physician.  Notify ordering physician if the patient does not receive the scheduled antibiotic for 24 hours or more.  The Pharmacy and Home Health Agency may adjust the timing of  dose while waiting for the trough result.  Amingoglycosides (e.g. Gentamicin, Tobramycin and Amikacin) peaks and troughs should be drawn twice weekly (preferably on Mondays and Thursdays) or every third dose.  Aminoglycoside peaks are not to be drawn if patient on Once-Daily Dosing (ODD).  Call physician or office if the trough is:     >1 for gentamicin,   >2 for tobramycin, or   >5 for amikacin  When clinically indicated obtain:  Urine culture. If the patient has a fever with purulent drainage from Pedraza or suprapubic catheter, or foul smelling urine. Do not irrigate a clogged Pedraza catheter. Replace it.  Blood cultures and Wound Gram stain with culture & sensitivity. If the patient has a fever or increasing drainage or foul odor from a wound. Notify the treating physician in a timely manner  Stool specimen. If diarrhea occurs while on antibiotics, send stools for C. difficile and WBCs.  When a drug is discontinued due to a low white blood cell count (WBCs) draw two consecutive CBC with differential and BUN, Ceatinine.         ALLERGIC OR ADVERSE REACTIONS TO MEDICATIONS  Mild reaction: (itching, with or without rash):  Administer Benadryl 50mg po x 1, then 25mg po q6h prn.   Notify office or physician in a timely matter.  Moderate reaction (itching with or without rash and/or wheezing, dyspnea, itchy throat):  Administer Benadryl 50 mg IV push x 1.   Notify office or physician in a timely manner.  Severe reaction i.e. Anaphylaxis (wheezing or stidor, sudden rash, lightheadedness, hypotension):  Administer epinephrine subcutaneous 0.3mg (1:1000) x 1 dose.   May repeat twice every 5 minutes if needed.  Call EMS and notify office or physician immediately.  For all above reactions: administer Solu-Cortef 100mg slow IV push x 1.    VASCULAR ACCESS DRESSING CHANGE PROTOCOL  Cleanse insertion site with ChlorPrep® or equivalent three times.  Secure catheter with Steri-Strips®, Bone®, or equivalent securing device.  Apply

## 2024-08-09 NOTE — CARE COORDINATION
Transition of Care-Plan for today's dose of Daptomycin , then discharge-waiting on PICC line placement. Care coordinated with Christoph-patient is agreeable to cost (146.00) a week, [facility] updated on discharge today and need for start of care tomorrow. Wife updated on plan . Discharge home today with [facility] following.    Jeanna HERRERA, RN  [facility]

## 2024-08-09 NOTE — PROCEDURES
PROCEDURE NOTE  Date: 8/9/2024   Name: Edilberto Hillman  YOB: 1950       PICC    Catheter insertion date: 8/9/2024     Product Number:  ASK [id number]   Lot No: [id number]   Gauge: 17   Lumen: SINGLE, 4.5 Fr   R Basilic    Vein Diameter: 0.62 cm   Arm circumference at insertion site: 31 cm   Catheter Length: 50 cm   Internal Length: 48 cm   External Catheter Length: 2 cm   Ultrasound Used: yes  VPS [facility] [place] confirms PICC tip is placed in the lower 1/3 of the SVC or at the Cavoatrial junction.  Floor nurse notified PICC is okay to use.   : MILLIE Love RN [facility]

## 2024-08-09 NOTE — PROGRESS NOTES
Multi-level arthritic changes with disc space narrowing and foraminal as well as spinal stenosis.   Appointment with Dr. Humberto Guy Occupational Therapy      Occupational Therapy referral received.   Attempt made. Spoke with patient and wife.  Reports he has history of hip surgeries, familiar with hip precautions, wife assists at home as needed.  At this time, feel no need for OT service.    OT order discontinued

## 2024-08-09 NOTE — DISCHARGE SUMMARY
[facility] Hospitalist       Hospitalist Physician Discharge Summary       [employer]  [address] [address]  [phone]        Cristofer Sierra MD  [address]  [address]  [facility] [address] [phone]    Schedule an appointment as soon as possible for a visit in 2 week(s)  For outpatient follow up    Corey Green DO  [address]  [facility] [address]  [phone]          Won Jones MD  [address] [id number] [phone]    Follow up  as scheduled      Activity level: as toy    Diet: ADULT DIET; Regular    Dispo:home    Condition at discharge: fair          Patient ID:  Edilberto Hillman  [id number]  73 y.o.  1950    Admit date: 8/5/2024    Discharge date and time:  8/9/2024  5:08 PM    Admission Diagnoses: Principal Problem:    Septic arthritis (HCC)  Active Problems:    Primary hypertension  Resolved Problems:    * No resolved hospital problems. *      Discharge Diagnoses: Principal Problem:    Septic arthritis (HCC)  Active Problems:    Primary hypertension  Resolved Problems:    * No resolved hospital problems. *    Sepsis(fever, leukocytosis)POA   Septic right total hip arthroplasty/arthritis  Anemia with acute blood loss component  Hyperglycemia likely due to stress reaction  Hypokalemia  thrombocytopenia  Gerd  Htn  hyperlipidemia      Consults:  IP CONSULT TO ORTHOPEDIC SURGERY  IP CONSULT TO INFECTIOUS DISEASES  IP CONSULT TO VASCULAR ACCESS TEAM  IP CONSULT TO HOME CARE NEEDS  IP CONSULT TO VASCULAR ACCESS TEAM    Procedures: Right total hip arthroplasty, irrigation and debridement with head and liner exchange, and placement of antibiotic beads.   POSTOPERATIVE DIAGNOSIS: Septic right total hip arthroplasty.       Hospital Course: Patient was admitted with Septic arthritis (HCC) [M00.9]. Patient is a 73 year old male with PMH significant for CAD s/p stents, cancer of the appendix s/p  hemicolectomy, enlarged prostate, GERD, HLD, HTN, and bilateral hip replacements x 2 each side.  Patient to ED due to right hip pain and fever that developed a day ago.  Tmax at home up to 101 °F.  Positive chills.  Called orthopedic surgeon and told to go to ED for additional evaluation.  Initially at [facility] and then transferred to [place].  Patient reports last right hip replacement done 2010.  States that the pain was constant and now it is intermittent and sharp.  Unable to raise right leg and unable to put weight on it with ambulation.  Denies recent trauma and illness.  Denies shortness of breath, chest pain, nausea/vomiting, abdominal pain, and changes in bowel/bladder habits.     Initial vital signs on arrival to [place] temperature 101.1 °F, HR 54, RR 18, and /65.  Lab results remarkable for creatinine 1.3, , sed rate 21, total bili 1.4, and WBC 17.2.  Blood cultures x 2 done.  Urine showing no infection.  CT scan of the right hip showing nonspecific right hip joint effusion.  Chest x-ray showing no acute cardiopulmonary disease.  Case discussed with orthopedist by ED doc and would like IR to drain right hip effusion.  No antibiotics initiated at this time.  Will admit for further evaluation and treatment.    Pt seen and examined by consultants. Pt had procedure as above. Pt improved with abx. On day of discharge, pt denied fevers, chills,n/v. Discharge planning d/w pt. Time given for questions and all questions answered.     Discharge Exam:  Vitals:    08/09/24 0041 08/09/24 0730 08/09/24 1544 08/09/24 1545   BP:  (!) 151/81     Pulse:  58     Resp: 16 16 18 18   Temp:  97.9 °F (36.6 °C)     TempSrc:  Oral     SpO2:  98%     Weight: 105.9 kg (233 lb 7.5 oz)      Height:           Skin: warm and dry, no rash or erythema  Pulmonary/Chest: clear to auscultation bilaterally- no wheezes, rales or rhonchi, normal air movement, no respiratory distress  Cardiovascular: rhythm reg at rate of

## 2024-08-09 NOTE — PROGRESS NOTES
Physical Therapy  Facility/Department: [address] MED SURG/TELE  Treatment Note  Name: Edilberto Hillman  : 1950  MRN: [id number]  Date of Service: 2024    Attending Provider:  Ze Arrington DO    Evaluating PT:  Ze Bryant Jr., P.T.    Room #:  0536/0536-A  Diagnosis:  Septic arthritis (HCC) [M00.9]  Pertinent PMHx/PSHx:  R ZENY  and revised   Procedure/Surgery:  24 R ZENY, I and D with head and liner exchange, placement of antibiotic beads  Precautions:  WBAT RLE, R hip posterolateral precautions, he is expected to receive a PICC line and informed him crutches are contraindicated at that time (he verbalized understanding)    SUBJECTIVE:    Pt lives with his wife and 99 y.o. mother-in-law in a 2 story home with 2 stairs and no rail to enter.  His bed and bath are on the first floor.  Pt ambulated with no AD PTA.  He states he has crutches, ww, and WC    OBJECTIVE:   Initial Evaluation  Date: 24 Treatment Short Term/ Long Term   Goals   Was pt agreeable to Eval/treatment? yes yes    Does pt have pain? C/o mild R hip pain with movement C/o mild L hip pain with movement    Bed Mobility  Rolling: Independent  Supine to sit: Independent  Sit to supine: NA  Scooting: Independent Rolling: Independent  Supine to sit: Independent  Sit to supine: NA  Scooting: Independent  Independent   Transfers Sit to stand: SBA  Stand to sit: SBA  Stand pivot: SBA with ww Sit to stand: Independent  Stand to sit: Independent  Stand pivot: Independent with ww Independent    Ambulation   250 feet with ww  feet with ww Independent  350 feet with ww Independent    Stair negotiation: ascended and descended 2 steps with 1 rail SBA 3 steps with 1 rail Independent  2 steps with 1 rail Independent    AM-PAC 6 Clicks       BLE ROM is WFL, and R hip is WFL allowed by hip precautions.   LLE strength is grossly 4+/5 and RLE is grossly 3-/5 to 4/5.   Sensation:  Pt reports numbness and tingling to B feet  due to neuropathy  Balance: sitting is Independent and standing with ww is Independent  Endurance: fair+    Therapeutic Exercises:  Pt was instructed in/performed supine exercises with the RLE: ankle PF/DF with bed sheet 2x15 reps, heel slides and quad sets AAROM with bed sheet 2x15 reps, hip ABD/ADD AAROM 2x10 reps.    Patient education  Pt educated on above exs as HEP.    Patient response to education:   Pt verbalized understanding Pt demonstrated skill Pt requires further education in this area   yes yes yes     ASSESSMENT:    Comments:  Pt was found in bed and was agreeable to PT.  He performed above exs and then walked with ww in the resendiz.  He was able to safely perform stairs and then walked with ww farther in the resendiz before returning back to his room.  Pt verbalized understanding of not using crutches if he gets a PICC line, and remembered his R hip precautions.  Pt did well maintaining R hip precautions throughout rest of session.    Treatment:  Patient practiced and was instructed in the following treatment:    Bed mobility, transfers, gait with ww, and stairs to improve functional strength and endurance.   Above exs to help improve RLE flexibility and decrease spasm and pain.     Pt was left sitting up in chair with call light left by patient and wife was present in the room.    PLAN:    Pt is making good progress toward established Physical Therapy goals.  Continue with physical therapy current plan of care.    Time in  11:35  Time out  12:05    Total Treatment Time  30 minutes     Evaluation Time includes thorough review of current medical information, gathering information on past medical history/social history and prior level of function, completion of standardized testing/informal observation of tasks, assessment of data and education on plan of care and goals.    CPT codes:  [] Low Complexity PT evaluation 17535  [] Moderate Complexity PT evaluation 91415  [] High Complexity PT evaluation 16541  []

## 2024-08-09 NOTE — PLAN OF CARE
Problem: Safety - Adult  Goal: Free from fall injury  8/9/2024 1043 by Mary Mcgill RN  Outcome: Progressing  8/9/2024 0346 by Vera Dowd RN  Outcome: Progressing  Flowsheets (Taken 8/8/2024 2015)  Free From Fall Injury: Instruct family/caregiver on patient safety     Problem: Pain  Goal: Verbalizes/displays adequate comfort level or baseline comfort level  8/9/2024 1043 by Mary Mcgill RN  Outcome: Progressing  8/9/2024 0346 by Vera Dowd RN  Outcome: Progressing     Problem: ABCDS Injury Assessment  Goal: Absence of physical injury  8/9/2024 1043 by Mary Mcgill RN  Outcome: Progressing  8/9/2024 0346 by Vera Dowd RN  Outcome: Progressing

## 2024-08-09 NOTE — PLAN OF CARE
Problem: Discharge Planning  Goal: Discharge to home or other facility with appropriate resources  Outcome: Progressing     Problem: Safety - Adult  Goal: Free from fall injury  Outcome: Progressing  Flowsheets (Taken 8/8/2024 2015)  Free From Fall Injury: Instruct family/caregiver on patient safety     Problem: Pain  Goal: Verbalizes/displays adequate comfort level or baseline comfort level  Outcome: Progressing     Problem: ABCDS Injury Assessment  Goal: Absence of physical injury  Outcome: Progressing

## 2024-08-10 LAB
MICROORGANISM SPEC CULT: ABNORMAL
MICROORGANISM SPEC CULT: NORMAL
MICROORGANISM/AGENT SPEC: ABNORMAL
MICROORGANISM/AGENT SPEC: NORMAL
SERVICE CMNT-IMP: ABNORMAL
SERVICE CMNT-IMP: NORMAL
SPECIMEN DESCRIPTION: ABNORMAL
SPECIMEN DESCRIPTION: NORMAL

## 2024-08-12 ENCOUNTER — TELEPHONE (OUTPATIENT)
Dept: FAMILY MEDICINE CLINIC | Age: 74
End: 2024-08-12

## 2024-08-12 LAB
MICROORGANISM SPEC CULT: NORMAL
SERVICE CMNT-IMP: NORMAL
SPECIMEN DESCRIPTION: NORMAL

## 2024-08-12 NOTE — TELEPHONE ENCOUNTER
Care Transitions Initial Follow Up Call    Outreach made within 2 business days of discharge: Yes    Patient: Edilberto Hillman Patient : 1950   MRN: [id number]  Reason for Admission: Septic arthritis  Discharge Date: 24       Spoke with: patient    Discharge department/facility: [facility] Interactive Patient Contact:  Was patient able to fill all prescriptions: Yes  Was patient instructed to bring all medications to the follow-up visit: Yes  Is patient taking all medications as directed in the discharge summary? Yes  Does patient understand their discharge instructions: Yes  Does patient have questions or concerns that need addressed prior to 7-14 day follow up office visit: no    Additional needs identified to be addressed with provider  No needs identified             Scheduled appointment with PCP within 7-14 days--appt scheduled 24.    Follow Up  Future Appointments   Date Time Provider Department Center   9/3/2024  9:15 AM Cristofer Sierra MD [facility]   2024  9:30 AM Corey Green DO [facility]       Marla Pollard

## 2024-08-13 LAB
MICROORGANISM SPEC CULT: NORMAL
MICROORGANISM SPEC CULT: NORMAL
MICROORGANISM/AGENT SPEC: NORMAL
SERVICE CMNT-IMP: NORMAL
SERVICE CMNT-IMP: NORMAL
SPECIMEN DESCRIPTION: NORMAL
SPECIMEN DESCRIPTION: NORMAL

## 2024-08-15 ENCOUNTER — TELEPHONE (OUTPATIENT)
Dept: FAMILY MEDICINE CLINIC | Age: 74
End: 2024-08-15

## 2024-08-15 LAB — SURGICAL PATHOLOGY REPORT: NORMAL

## 2024-08-15 NOTE — TELEPHONE ENCOUNTER
Kay, nurse with [facility], called to get the perimeters for pt's heart rate. She stated it was in the 40's and pt said said that was normal for him. Kay stated they have to call PCP office for anything under 60 unless they can get documentation.    Last seen 5/15/2024  Next appt 8/20/2024    Requested Prescriptions      No prescriptions requested or ordered in this encounter

## 2024-08-15 NOTE — TELEPHONE ENCOUNTER
I am aware of the patient's history of bradycardia, he also follows with cardiology.  I believe heart rate in the 40s is actually low for him, please have them call if it persists in the 40s or lower

## 2024-08-16 LAB
MICROORGANISM SPEC CULT: NORMAL
MICROORGANISM/AGENT SPEC: NORMAL
SERVICE CMNT-IMP: NORMAL
SPECIMEN DESCRIPTION: NORMAL

## 2024-08-17 LAB
MICROORGANISM SPEC CULT: NORMAL
SERVICE CMNT-IMP: NORMAL
SPECIMEN DESCRIPTION: NORMAL

## 2024-08-20 ENCOUNTER — OFFICE VISIT (OUTPATIENT)
Dept: FAMILY MEDICINE CLINIC | Age: 74
End: 2024-08-20

## 2024-08-20 VITALS
HEIGHT: 72 IN | HEART RATE: 50 BPM | SYSTOLIC BLOOD PRESSURE: 118 MMHG | OXYGEN SATURATION: 98 % | RESPIRATION RATE: 16 BRPM | DIASTOLIC BLOOD PRESSURE: 68 MMHG | BODY MASS INDEX: 32.37 KG/M2 | WEIGHT: 239 LBS | TEMPERATURE: 97.3 F

## 2024-08-20 DIAGNOSIS — K21.9 GASTROESOPHAGEAL REFLUX DISEASE WITHOUT ESOPHAGITIS: ICD-10-CM

## 2024-08-20 DIAGNOSIS — I10 ESSENTIAL HYPERTENSION: ICD-10-CM

## 2024-08-20 DIAGNOSIS — M00.9 PYOGENIC ARTHRITIS OF RIGHT HIP, DUE TO UNSPECIFIED ORGANISM (HCC): ICD-10-CM

## 2024-08-20 DIAGNOSIS — E78.5 DYSLIPIDEMIA: ICD-10-CM

## 2024-08-20 DIAGNOSIS — Z09 HOSPITAL DISCHARGE FOLLOW-UP: Primary | ICD-10-CM

## 2024-08-20 LAB
ALBUMIN SERPL-MCNC: 3.4 G/DL (ref 3.5–5.2)
ALP SERPL-CCNC: 69 U/L (ref 40–129)
ALT SERPL-CCNC: 10 U/L (ref 0–40)
ANION GAP SERPL CALCULATED.3IONS-SCNC: 10 MMOL/L (ref 7–16)
AST SERPL-CCNC: 15 U/L (ref 0–39)
BASOPHILS # BLD: 0.02 K/UL (ref 0–0.2)
BASOPHILS NFR BLD: 0 % (ref 0–2)
BILIRUB SERPL-MCNC: 0.3 MG/DL (ref 0–1.2)
BUN SERPL-MCNC: 19 MG/DL (ref 6–23)
CALCIUM SERPL-MCNC: 8.6 MG/DL (ref 8.6–10.2)
CHLORIDE SERPL-SCNC: 104 MMOL/L (ref 98–107)
CO2 SERPL-SCNC: 26 MMOL/L (ref 22–29)
CREAT SERPL-MCNC: 1.1 MG/DL (ref 0.7–1.2)
CRP SERPL HS-MCNC: 50 MG/L (ref 0–5)
EOSINOPHIL # BLD: 0.07 K/UL (ref 0.05–0.5)
EOSINOPHILS RELATIVE PERCENT: 1 % (ref 0–6)
ERYTHROCYTE [DISTWIDTH] IN BLOOD BY AUTOMATED COUNT: 13.2 % (ref 11.5–15)
ERYTHROCYTE [SEDIMENTATION RATE] IN BLOOD BY WESTERGREN METHOD: 80 MM/HR (ref 0–15)
GFR, ESTIMATED: 73 ML/MIN/1.73M2
GLUCOSE SERPL-MCNC: 168 MG/DL (ref 74–99)
HCT VFR BLD AUTO: 34.9 % (ref 37–54)
HGB BLD-MCNC: 11 G/DL (ref 12.5–16.5)
IMM GRANULOCYTES # BLD AUTO: <0.03 K/UL (ref 0–0.58)
IMM GRANULOCYTES NFR BLD: 0 % (ref 0–5)
LYMPHOCYTES NFR BLD: 1.27 K/UL (ref 1.5–4)
LYMPHOCYTES RELATIVE PERCENT: 16 % (ref 20–42)
MCH RBC QN AUTO: 29 PG (ref 26–35)
MCHC RBC AUTO-ENTMCNC: 31.5 G/DL (ref 32–34.5)
MCV RBC AUTO: 92.1 FL (ref 80–99.9)
MONOCYTES NFR BLD: 0.48 K/UL (ref 0.1–0.95)
MONOCYTES NFR BLD: 6 % (ref 2–12)
NEUTROPHILS NFR BLD: 77 % (ref 43–80)
NEUTS SEG NFR BLD: 6.3 K/UL (ref 1.8–7.3)
PLATELET # BLD AUTO: 278 K/UL (ref 130–450)
PMV BLD AUTO: 11 FL (ref 7–12)
POTASSIUM SERPL-SCNC: 3.8 MMOL/L (ref 3.5–5)
PROT SERPL-MCNC: 5.9 G/DL (ref 6.4–8.3)
RBC # BLD AUTO: 3.79 M/UL (ref 3.8–5.8)
SODIUM SERPL-SCNC: 140 MMOL/L (ref 132–146)
WBC OTHER # BLD: 8.2 K/UL (ref 4.5–11.5)

## 2024-08-20 RX ORDER — TRAMADOL HYDROCHLORIDE 50 MG/1
50 TABLET ORAL EVERY 6 HOURS PRN
Qty: 20 TABLET | Refills: 0 | Status: ON HOLD | OUTPATIENT
Start: 2024-08-20 | End: 2024-08-25

## 2024-08-20 RX ORDER — CEFTRIAXONE 2 G/1
INJECTION, POWDER, FOR SOLUTION INTRAMUSCULAR; INTRAVENOUS
Status: ON HOLD | COMMUNITY
Start: 2024-08-14

## 2024-08-20 RX ORDER — APIXABAN 2.5 MG/1
2.5 TABLET, FILM COATED ORAL 2 TIMES DAILY
Status: ON HOLD | COMMUNITY
Start: 2024-08-10

## 2024-08-20 ASSESSMENT — PATIENT HEALTH QUESTIONNAIRE - PHQ9
SUM OF ALL RESPONSES TO PHQ QUESTIONS 1-9: 0
1. LITTLE INTEREST OR PLEASURE IN DOING THINGS: NOT AT ALL
SUM OF ALL RESPONSES TO PHQ QUESTIONS 1-9: 0
SUM OF ALL RESPONSES TO PHQ9 QUESTIONS 1 & 2: 0
2. FEELING DOWN, DEPRESSED OR HOPELESS: NOT AT ALL

## 2024-08-20 ASSESSMENT — ENCOUNTER SYMPTOMS: SHORTNESS OF BREATH: 0

## 2024-08-20 NOTE — PROGRESS NOTES
manage pain Max Daily Amount: 200 mg  Started by: Dr. Corey Green, DO            CHANGE how you take these medications      amLODIPine 5 MG tablet  Commonly known as: NORVASC  Take 1 tablet by mouth daily  What changed: when to take this            CONTINUE taking these medications      aspirin 81 MG EC tablet     * cefTRIAXone infusion  Commonly known as: ROCEPHIN  Infuse 2,000 mg intravenously every 24 hours Compound per protocol     * cefTRIAXone 2 g injection  Commonly known as: ROCEPHIN     colchicine 0.6 MG tablet  Commonly known as: Colcrys  Take 1 tablet by mouth daily as needed (gout flare)     CoQ10 100 MG Caps     Docusate Sodium 100 MG Tabs     Eliquis 2.5 MG Tabs tablet  Generic drug: apixaban     hydroCHLOROthiazide 12.5 MG tablet  TAKE 1 TABLET DAILY     magnesium oxide 400 (240 Mg) MG tablet  Commonly known as: MAG-OX     olmesartan 40 MG tablet  Commonly known as: BENICAR  TAKE 1 TABLET DAILY     pantoprazole 40 MG tablet  Commonly known as: PROTONIX  Take 1 tablet by mouth daily as needed (heartburn)     rosuvastatin 40 MG tablet  Commonly known as: CRESTOR  TAKE 1 TABLET AT BEDTIME     sertraline 50 MG tablet  Commonly known as: ZOLOFT  Take 1 tablet by mouth daily     sucralfate 1 GM tablet  Commonly known as: CARAFATE  Take 1 tablet by mouth 4 times daily Takes as needed     vitamin D3 125 MCG (5000 UT) Tabs tablet  Commonly known as: CHOLECALCIFEROL  Take 0.5 tablets by mouth daily     Zinc 100 MG Tabs           * This list has 2 medication(s) that are the same as other medications prescribed for you. Read the directions carefully, and ask your doctor or other care provider to review them with you.                   Where to Get Your Medications        These medications were sent to [id number] - [place], [place]  [facility] - P [phone] - F [phone]  [address] [place] [id number]      Phone: [phone]   traMADol 50 MG tablet          Medications marked

## 2024-08-22 PROCEDURE — 99285 EMERGENCY DEPT VISIT HI MDM: CPT

## 2024-08-22 ASSESSMENT — PAIN DESCRIPTION - LOCATION: LOCATION: HIP

## 2024-08-22 ASSESSMENT — PAIN SCALES - GENERAL: PAINLEVEL_OUTOF10: 4

## 2024-08-23 ENCOUNTER — APPOINTMENT (OUTPATIENT)
Dept: GENERAL RADIOLOGY | Age: 74
DRG: 864 | End: 2024-08-23
Payer: MEDICARE

## 2024-08-23 ENCOUNTER — APPOINTMENT (OUTPATIENT)
Dept: CT IMAGING | Age: 74
DRG: 864 | End: 2024-08-23
Payer: MEDICARE

## 2024-08-23 ENCOUNTER — HOSPITAL ENCOUNTER (INPATIENT)
Age: 74
LOS: 1 days | Discharge: HOME | DRG: 864 | End: 2024-08-26
Attending: STUDENT IN AN ORGANIZED HEALTH CARE EDUCATION/TRAINING PROGRAM | Admitting: INTERNAL MEDICINE
Payer: MEDICARE

## 2024-08-23 DIAGNOSIS — R50.9 FEVER, UNSPECIFIED FEVER CAUSE: Primary | ICD-10-CM

## 2024-08-23 DIAGNOSIS — R19.7 DIARRHEA, UNSPECIFIED TYPE: ICD-10-CM

## 2024-08-23 DIAGNOSIS — M00.9 PYOGENIC ARTHRITIS OF RIGHT HIP, DUE TO UNSPECIFIED ORGANISM (HCC): ICD-10-CM

## 2024-08-23 PROBLEM — F32.A DEPRESSION: Status: ACTIVE | Noted: 2024-08-23

## 2024-08-23 PROBLEM — R50.82 POSTOPERATIVE FEVER: Status: ACTIVE | Noted: 2024-08-23

## 2024-08-23 PROBLEM — E78.5 HYPERLIPIDEMIA: Status: ACTIVE | Noted: 2024-08-23

## 2024-08-23 LAB
ALBUMIN SERPL-MCNC: 3.7 G/DL (ref 3.5–5.2)
ALP SERPL-CCNC: 74 U/L (ref 40–129)
ALT SERPL-CCNC: 12 U/L (ref 0–40)
ANION GAP SERPL CALCULATED.3IONS-SCNC: 12 MMOL/L (ref 7–16)
AST SERPL-CCNC: 20 U/L (ref 0–39)
B PARAP IS1001 DNA NPH QL NAA+NON-PROBE: NOT DETECTED
B PERT DNA SPEC QL NAA+PROBE: NOT DETECTED
BASOPHILS # BLD: 0.02 K/UL (ref 0–0.2)
BASOPHILS NFR BLD: 0 % (ref 0–2)
BILIRUB SERPL-MCNC: 0.4 MG/DL (ref 0–1.2)
BILIRUB UR QL STRIP: NEGATIVE
BUN SERPL-MCNC: 20 MG/DL (ref 6–23)
C PNEUM DNA NPH QL NAA+NON-PROBE: NOT DETECTED
CALCIUM SERPL-MCNC: 9 MG/DL (ref 8.6–10.2)
CHLORIDE SERPL-SCNC: 99 MMOL/L (ref 98–107)
CLARITY UR: CLEAR
CO2 SERPL-SCNC: 27 MMOL/L (ref 22–29)
COLOR UR: YELLOW
CREAT SERPL-MCNC: 1.1 MG/DL (ref 0.7–1.2)
CRP SERPL HS-MCNC: 74 MG/L (ref 0–5)
CRP SERPL HS-MCNC: 91 MG/L (ref 0–5)
EOSINOPHIL # BLD: 0.03 K/UL (ref 0.05–0.5)
EOSINOPHILS RELATIVE PERCENT: 1 % (ref 0–6)
ERYTHROCYTE [DISTWIDTH] IN BLOOD BY AUTOMATED COUNT: 13.4 % (ref 11.5–15)
ERYTHROCYTE [SEDIMENTATION RATE] IN BLOOD BY WESTERGREN METHOD: 83 MM/HR (ref 0–15)
ERYTHROCYTE [SEDIMENTATION RATE] IN BLOOD BY WESTERGREN METHOD: 83 MM/HR (ref 0–15)
FLUAV RNA NPH QL NAA+NON-PROBE: NOT DETECTED
FLUBV RNA NPH QL NAA+NON-PROBE: NOT DETECTED
GFR, ESTIMATED: 69 ML/MIN/1.73M2
GLUCOSE SERPL-MCNC: 98 MG/DL (ref 74–99)
GLUCOSE UR STRIP-MCNC: NEGATIVE MG/DL
HADV DNA NPH QL NAA+NON-PROBE: NOT DETECTED
HCOV 229E RNA NPH QL NAA+NON-PROBE: NOT DETECTED
HCOV HKU1 RNA NPH QL NAA+NON-PROBE: NOT DETECTED
HCOV NL63 RNA NPH QL NAA+NON-PROBE: NOT DETECTED
HCOV OC43 RNA NPH QL NAA+NON-PROBE: NOT DETECTED
HCT VFR BLD AUTO: 38.5 % (ref 37–54)
HGB BLD-MCNC: 12.1 G/DL (ref 12.5–16.5)
HGB UR QL STRIP.AUTO: ABNORMAL
HMPV RNA NPH QL NAA+NON-PROBE: NOT DETECTED
HPIV1 RNA NPH QL NAA+NON-PROBE: NOT DETECTED
HPIV2 RNA NPH QL NAA+NON-PROBE: NOT DETECTED
HPIV3 RNA NPH QL NAA+NON-PROBE: NOT DETECTED
HPIV4 RNA NPH QL NAA+NON-PROBE: NOT DETECTED
IMM GRANULOCYTES # BLD AUTO: <0.03 K/UL (ref 0–0.58)
IMM GRANULOCYTES NFR BLD: 0 % (ref 0–5)
INFLUENZA A BY PCR: NOT DETECTED
INFLUENZA B BY PCR: NOT DETECTED
KETONES UR STRIP-MCNC: NEGATIVE MG/DL
LACTATE BLDV-SCNC: 0.8 MMOL/L (ref 0.5–1.9)
LEUKOCYTE ESTERASE UR QL STRIP: NEGATIVE
LYMPHOCYTES NFR BLD: 0.8 K/UL (ref 1.5–4)
LYMPHOCYTES RELATIVE PERCENT: 13 % (ref 20–42)
M PNEUMO DNA NPH QL NAA+NON-PROBE: NOT DETECTED
MCH RBC QN AUTO: 28.5 PG (ref 26–35)
MCHC RBC AUTO-ENTMCNC: 31.4 G/DL (ref 32–34.5)
MCV RBC AUTO: 90.6 FL (ref 80–99.9)
MICROORGANISM SPEC CULT: NORMAL
MICROORGANISM/AGENT SPEC: NORMAL
MONOCYTES NFR BLD: 0.56 K/UL (ref 0.1–0.95)
MONOCYTES NFR BLD: 9 % (ref 2–12)
NEUTROPHILS NFR BLD: 77 % (ref 43–80)
NEUTS SEG NFR BLD: 4.66 K/UL (ref 1.8–7.3)
NITRITE UR QL STRIP: NEGATIVE
PH UR STRIP: 5.5 [PH] (ref 5–9)
PLATELET # BLD AUTO: 237 K/UL (ref 130–450)
PMV BLD AUTO: 9.9 FL (ref 7–12)
POTASSIUM SERPL-SCNC: 4 MMOL/L (ref 3.5–5)
PROT SERPL-MCNC: 7.5 G/DL (ref 6.4–8.3)
PROT UR STRIP-MCNC: NEGATIVE MG/DL
RBC # BLD AUTO: 4.25 M/UL (ref 3.8–5.8)
RBC #/AREA URNS HPF: ABNORMAL /HPF
RSV RNA NPH QL NAA+NON-PROBE: NOT DETECTED
RV+EV RNA NPH QL NAA+NON-PROBE: NOT DETECTED
SARS-COV-2 RDRP RESP QL NAA+PROBE: NOT DETECTED
SARS-COV-2 RNA NPH QL NAA+NON-PROBE: NOT DETECTED
SERVICE CMNT-IMP: NORMAL
SODIUM SERPL-SCNC: 138 MMOL/L (ref 132–146)
SP GR UR STRIP: >1.03 (ref 1–1.03)
SPECIMEN DESCRIPTION: NORMAL
UROBILINOGEN UR STRIP-ACNC: 0.2 EU/DL (ref 0–1)
WBC #/AREA URNS HPF: ABNORMAL /HPF
WBC OTHER # BLD: 6.1 K/UL (ref 4.5–11.5)

## 2024-08-23 PROCEDURE — 73701 CT LOWER EXTREMITY W/DYE: CPT

## 2024-08-23 PROCEDURE — 81001 URINALYSIS AUTO W/SCOPE: CPT

## 2024-08-23 PROCEDURE — 99223 1ST HOSP IP/OBS HIGH 75: CPT | Performed by: INTERNAL MEDICINE

## 2024-08-23 PROCEDURE — 6360000004 HC RX CONTRAST MEDICATION: Performed by: RADIOLOGY

## 2024-08-23 PROCEDURE — 74177 CT ABD & PELVIS W/CONTRAST: CPT

## 2024-08-23 PROCEDURE — 87502 INFLUENZA DNA AMP PROBE: CPT

## 2024-08-23 PROCEDURE — 6370000000 HC RX 637 (ALT 250 FOR IP): Performed by: INTERNAL MEDICINE

## 2024-08-23 PROCEDURE — 87635 SARS-COV-2 COVID-19 AMP PRB: CPT

## 2024-08-23 PROCEDURE — 85652 RBC SED RATE AUTOMATED: CPT

## 2024-08-23 PROCEDURE — 87040 BLOOD CULTURE FOR BACTERIA: CPT

## 2024-08-23 PROCEDURE — G0378 HOSPITAL OBSERVATION PER HR: HCPCS

## 2024-08-23 PROCEDURE — 71045 X-RAY EXAM CHEST 1 VIEW: CPT

## 2024-08-23 PROCEDURE — 2580000003 HC RX 258: Performed by: EMERGENCY MEDICINE

## 2024-08-23 PROCEDURE — 2580000003 HC RX 258: Performed by: INTERNAL MEDICINE

## 2024-08-23 PROCEDURE — 96374 THER/PROPH/DIAG INJ IV PUSH: CPT

## 2024-08-23 PROCEDURE — 0202U NFCT DS 22 TRGT SARS-COV-2: CPT

## 2024-08-23 PROCEDURE — 85025 COMPLETE CBC W/AUTO DIFF WBC: CPT

## 2024-08-23 PROCEDURE — 6370000000 HC RX 637 (ALT 250 FOR IP)

## 2024-08-23 PROCEDURE — 83605 ASSAY OF LACTIC ACID: CPT

## 2024-08-23 PROCEDURE — 6360000002 HC RX W HCPCS: Performed by: EMERGENCY MEDICINE

## 2024-08-23 PROCEDURE — 86140 C-REACTIVE PROTEIN: CPT

## 2024-08-23 PROCEDURE — 87086 URINE CULTURE/COLONY COUNT: CPT

## 2024-08-23 PROCEDURE — 80053 COMPREHEN METABOLIC PANEL: CPT

## 2024-08-23 RX ORDER — ONDANSETRON 4 MG/1
4 TABLET, ORALLY DISINTEGRATING ORAL EVERY 8 HOURS PRN
Status: DISCONTINUED | OUTPATIENT
Start: 2024-08-23 | End: 2024-08-26 | Stop reason: HOSPADM

## 2024-08-23 RX ORDER — ACETAMINOPHEN 650 MG/1
650 SUPPOSITORY RECTAL EVERY 6 HOURS PRN
Status: DISCONTINUED | OUTPATIENT
Start: 2024-08-23 | End: 2024-08-26 | Stop reason: HOSPADM

## 2024-08-23 RX ORDER — ASPIRIN 81 MG/1
81 TABLET ORAL DAILY
Status: DISCONTINUED | OUTPATIENT
Start: 2024-08-23 | End: 2024-08-23

## 2024-08-23 RX ORDER — PANTOPRAZOLE SODIUM 40 MG/1
40 TABLET, DELAYED RELEASE ORAL DAILY PRN
Status: DISCONTINUED | OUTPATIENT
Start: 2024-08-23 | End: 2024-08-26 | Stop reason: HOSPADM

## 2024-08-23 RX ORDER — HYDROCHLOROTHIAZIDE 12.5 MG/1
12.5 TABLET ORAL DAILY
Status: DISCONTINUED | OUTPATIENT
Start: 2024-08-23 | End: 2024-08-23

## 2024-08-23 RX ORDER — TRAMADOL HYDROCHLORIDE 50 MG/1
50 TABLET ORAL EVERY 6 HOURS PRN
Status: DISCONTINUED | OUTPATIENT
Start: 2024-08-23 | End: 2024-08-26 | Stop reason: HOSPADM

## 2024-08-23 RX ORDER — ACETAMINOPHEN 325 MG/1
650 TABLET ORAL EVERY 6 HOURS PRN
Status: DISCONTINUED | OUTPATIENT
Start: 2024-08-23 | End: 2024-08-26 | Stop reason: HOSPADM

## 2024-08-23 RX ORDER — SODIUM CHLORIDE 0.9 % (FLUSH) 0.9 %
5-40 SYRINGE (ML) INJECTION EVERY 12 HOURS SCHEDULED
Status: DISCONTINUED | OUTPATIENT
Start: 2024-08-23 | End: 2024-08-26 | Stop reason: HOSPADM

## 2024-08-23 RX ORDER — SUCRALFATE 1 G/1
1 TABLET ORAL
Status: DISCONTINUED | OUTPATIENT
Start: 2024-08-23 | End: 2024-08-26 | Stop reason: HOSPADM

## 2024-08-23 RX ORDER — ASPIRIN 81 MG/1
81 TABLET ORAL DAILY
Status: DISCONTINUED | OUTPATIENT
Start: 2024-08-25 | End: 2024-08-26 | Stop reason: HOSPADM

## 2024-08-23 RX ORDER — LOSARTAN POTASSIUM 50 MG/1
100 TABLET ORAL DAILY
Status: DISCONTINUED | OUTPATIENT
Start: 2024-08-23 | End: 2024-08-26 | Stop reason: HOSPADM

## 2024-08-23 RX ORDER — POTASSIUM CHLORIDE 7.45 MG/ML
10 INJECTION INTRAVENOUS PRN
Status: DISCONTINUED | OUTPATIENT
Start: 2024-08-23 | End: 2024-08-23

## 2024-08-23 RX ORDER — VITAMIN B COMPLEX
2500 TABLET ORAL DAILY
Status: DISCONTINUED | OUTPATIENT
Start: 2024-08-23 | End: 2024-08-26 | Stop reason: HOSPADM

## 2024-08-23 RX ORDER — ROSUVASTATIN CALCIUM 20 MG/1
40 TABLET, COATED ORAL NIGHTLY
Status: DISCONTINUED | OUTPATIENT
Start: 2024-08-23 | End: 2024-08-26 | Stop reason: HOSPADM

## 2024-08-23 RX ORDER — CLONAZEPAM 0.5 MG/1
0.5 TABLET ORAL NIGHTLY PRN
Status: DISCONTINUED | OUTPATIENT
Start: 2024-08-23 | End: 2024-08-26 | Stop reason: HOSPADM

## 2024-08-23 RX ORDER — MAGNESIUM SULFATE IN WATER 40 MG/ML
2000 INJECTION, SOLUTION INTRAVENOUS PRN
Status: DISCONTINUED | OUTPATIENT
Start: 2024-08-23 | End: 2024-08-23

## 2024-08-23 RX ORDER — POLYETHYLENE GLYCOL 3350 17 G/17G
17 POWDER, FOR SOLUTION ORAL DAILY PRN
Status: DISCONTINUED | OUTPATIENT
Start: 2024-08-23 | End: 2024-08-26 | Stop reason: HOSPADM

## 2024-08-23 RX ORDER — SODIUM CHLORIDE 9 MG/ML
INJECTION, SOLUTION INTRAVENOUS PRN
Status: DISCONTINUED | OUTPATIENT
Start: 2024-08-23 | End: 2024-08-26 | Stop reason: HOSPADM

## 2024-08-23 RX ORDER — AMLODIPINE BESYLATE 5 MG/1
5 TABLET ORAL NIGHTLY
Status: DISCONTINUED | OUTPATIENT
Start: 2024-08-23 | End: 2024-08-26 | Stop reason: HOSPADM

## 2024-08-23 RX ORDER — ONDANSETRON 2 MG/ML
4 INJECTION INTRAMUSCULAR; INTRAVENOUS EVERY 6 HOURS PRN
Status: DISCONTINUED | OUTPATIENT
Start: 2024-08-23 | End: 2024-08-26 | Stop reason: HOSPADM

## 2024-08-23 RX ORDER — IOPAMIDOL 755 MG/ML
75 INJECTION, SOLUTION INTRAVASCULAR
Status: COMPLETED | OUTPATIENT
Start: 2024-08-23 | End: 2024-08-23

## 2024-08-23 RX ORDER — POTASSIUM CHLORIDE 1500 MG/1
40 TABLET, EXTENDED RELEASE ORAL PRN
Status: DISCONTINUED | OUTPATIENT
Start: 2024-08-23 | End: 2024-08-23

## 2024-08-23 RX ORDER — TRAMADOL HYDROCHLORIDE 50 MG/1
50 TABLET ORAL EVERY 6 HOURS PRN
Status: DISCONTINUED | OUTPATIENT
Start: 2024-08-23 | End: 2024-08-23

## 2024-08-23 RX ORDER — SODIUM CHLORIDE 0.9 % (FLUSH) 0.9 %
5-40 SYRINGE (ML) INJECTION PRN
Status: DISCONTINUED | OUTPATIENT
Start: 2024-08-23 | End: 2024-08-26 | Stop reason: HOSPADM

## 2024-08-23 RX ADMIN — SODIUM CHLORIDE, PRESERVATIVE FREE 10 ML: 5 INJECTION INTRAVENOUS at 20:28

## 2024-08-23 RX ADMIN — WATER 2000 MG: 1 INJECTION INTRAMUSCULAR; INTRAVENOUS; SUBCUTANEOUS at 10:57

## 2024-08-23 RX ADMIN — ACETAMINOPHEN 650 MG: 325 TABLET ORAL at 17:10

## 2024-08-23 RX ADMIN — CLONAZEPAM 0.5 MG: 0.5 TABLET ORAL at 21:25

## 2024-08-23 RX ADMIN — Medication 2500 UNITS: at 13:33

## 2024-08-23 RX ADMIN — SUCRALFATE 1 G: 1 TABLET ORAL at 20:27

## 2024-08-23 RX ADMIN — IOPAMIDOL 75 ML: 755 INJECTION, SOLUTION INTRAVENOUS at 05:38

## 2024-08-23 RX ADMIN — APIXABAN 2.5 MG: 2.5 TABLET, FILM COATED ORAL at 13:34

## 2024-08-23 RX ADMIN — LOSARTAN POTASSIUM 100 MG: 50 TABLET, FILM COATED ORAL at 13:34

## 2024-08-23 RX ADMIN — ROSUVASTATIN CALCIUM 40 MG: 20 TABLET, FILM COATED ORAL at 20:27

## 2024-08-23 RX ADMIN — AMLODIPINE BESYLATE 5 MG: 5 TABLET ORAL at 20:27

## 2024-08-23 RX ADMIN — ACETAMINOPHEN 650 MG: 325 TABLET ORAL at 22:53

## 2024-08-23 RX ADMIN — SERTRALINE HYDROCHLORIDE 50 MG: 50 TABLET ORAL at 13:34

## 2024-08-23 RX ADMIN — HYDROCHLOROTHIAZIDE 12.5 MG: 12.5 TABLET ORAL at 13:34

## 2024-08-23 RX ADMIN — APIXABAN 2.5 MG: 2.5 TABLET, FILM COATED ORAL at 20:27

## 2024-08-23 ASSESSMENT — PAIN SCALES - GENERAL: PAINLEVEL_OUTOF10: 1

## 2024-08-23 ASSESSMENT — PAIN DESCRIPTION - LOCATION: LOCATION: HIP

## 2024-08-23 ASSESSMENT — PAIN - FUNCTIONAL ASSESSMENT: PAIN_FUNCTIONAL_ASSESSMENT: 0-10

## 2024-08-23 NOTE — PROGRESS NOTES
Database initiated pharmacy and medications verified with the patient. He is A&O comes in from with wife. He uses no assistive devices and is RA at baseline. He has a PICC line in [facility]. He is having diarrhea. He has [facility] for iv antibiotics.

## 2024-08-23 NOTE — ED PROVIDER NOTES
[facility] 6S INTERMEDIATE  EMERGENCY DEPARTMENT ENCOUNTER        Pt Name: Edilberto Hillman  MRN: [id number]  Birthdate 1950  Date of evaluation: 8/22/2024  Provider: Concetta Gtz DO  PCP: Corey Green DO  Note Started: 5:03 AM EDT 8/23/24    CHIEF COMPLAINT       Chief Complaint   Patient presents with    Post-op Problem     Pt had an infection taken out of his right hip two weeks ago. Receiving IV ATB at home. Follows with Dr. Jones (surgeon) and Dr. Santo (ID)    Fever     102.7 fever at home took 1000mg Tylenol at 9pm    Diarrhea       HISTORY OF PRESENT ILLNESS: 1 or more Elements   History From: patient and wife    Limitations to history : None    Edilberto Hillman is a 73 y.o. male with a history of recent right hip revision performed by Dr. Jones on 8-7-2024, hypertension, hyperlipidemia, CAD, appendiceal cancer presenting to the emergency department complaining of a fever.  Patient states that he had fever 1-2.7 at home and took 1 g of Tylenol at 9 PM.  He states that he had infected hardware taken out of his right hip 2 weeks ago and has been receiving IV antibiotics at home.  He currently has a PICC line in his right arm is receiving IV Rocephin.  He does follow closely with infectious disease as well, Dr. Sierra.  Patient states he has had several episodes of diarrhea.  He does complain of some bodyaches intermittently he has no other complaints at this time.  Patient states that he has been able to ambulate on the hip and he feels like it is improving.  He denies any cough, congestion, dysuria, hematuria, abdominal pain, nausea, vomiting, back pain, shortness of breath, chest pain, lightheadedness, dizziness, syncope, or other acute symptoms or concerns.    Nursing Notes were all reviewed and agreed with or any disagreements were addressed in the HPI.    REVIEW OF SYSTEMS :      Review of Systems    Positives and Pertinent negatives as per HPI.     SURGICAL HISTORY  Oral Given 8/23/24 2125)   iopamidol (ISOVUE-370) 76 % injection 75 mL (75 mLs IntraVENous Given 8/23/24 0538)   cefTRIAXone (ROCEPHIN) 2,000 mg in sterile water 20 mL IV syringe (2,000 mg IntraVENous Given 8/23/24 1057)       ED Course as of 08/23/24 2314   Fri Aug 23, 2024   0734 Spoke with evgeny Wang, who states patient can be discharged home with close follow up.  [KG]   1148 Discussed case with Dr. Sierra he recommends admission to identify clear source of fever as he has concern for CLABSI.  Continued IV antibiotics Rocephin and patient will be admitted to the hospital service [CF]   1158 Spoke with spoke with Dr. Segundo who will admit the patient [CF]      ED Course User Index  [CF] Schuyler Diaz DO  [KG] Concetta Gtz DO          Medical Decision Making/Differential Diagnosis:    CC/HPI Summary, Social Determinants of health, Records Reviewed, DDx, testing done/not done, ED Course, Reassessment, disposition considerations/shared decision making with patient, consults, disposition:        The patient is a 73-year-old male present emergency department complaining of a postop fever.  He has a temperature of 90.7 here and took Tylenol just prior to coming here.  Patient states his temperature was one 2.7 at home.  He is otherwise hemodynamically stable, nontoxic, no acute distress.    Patient is a former smoker which is a known social detriment to his health.  Prior records reviewed and patient did have surgery on 8/7-24 and records reviewed from this visit.    Differential diagnosis includes was not limited to pneumonia versus UTI versus COVID versus postop infection versus cellulitis versus abscess versus some other viral etiology.    CMP does not show any significant acute abnormal findings.  Lactic is normal at 0.8.  CBC does not show any significant leukocytosis.  Blood slightly hemoconcentrated as patient's hemoglobin is 12.1.  COVID and flu are negative.  Urinalysis does not show  DO  08/23/24 2314

## 2024-08-23 NOTE — ED NOTES
ED to Inpatient Handoff Report    Notified michael that electronic handoff available and patient ready for transport to room 630.    Safety Risks: None identified    Patient in Restraints: no    Constant Observer or Patient : no    Telemetry Monitoring Ordered: Yes          Order to transfer to unit without monitor: YES    Last MEWS: 1 Time completed: 1252    Deterioration Index: 18.48    Vitals:    08/22/24 2255 08/22/24 2256 08/23/24 0503 08/23/24 1251   BP:   (!) 130/96 128/81   Pulse:   55 60   Resp: 16  16 18   Temp: 99.5 °F (37.5 °C)  99.7 °F (37.6 °C) 98.9 °F (37.2 °C)   TempSrc: Oral  Oral Oral   SpO2: 99%  98% 99%   Weight:  94.3 kg (208 lb)     Height:  1.829 m (6')         Opportunity for questions and clarification was provided.

## 2024-08-23 NOTE — H&P
[facility] Hospitalist Group History and Physical      CHIEF COMPLAINT:  post op fever, diarrhea     History of Present Illness:      This is a 73 year old male with past medical history of CAD s/p stents, cancer of the appendix s/p hemicolectomy, enlarged prostate, GERD, HLD, HTN, and bilateral hip replacements x 2 each side. He presented to ER 8/5/24 with complaints of right hip pain and fever and was found to have right hip septic arthritis secondary to Streptococcus s/p head a linear exchange and placement with antibiotic beads on 8/7/24. He was discharged home on 8/9 with a PICC line and Ceftriaxone. However, patient returned lats night with a fever of 102.7 - he took 1 gram Tylenol prior to arrival. He is also complaining of diarrhea, watery, 3 times a day. Lab workup unremarkable. Ct imaging shows mild fluid along the right femoral greater trochanter, consistent with seroma. Id and ortho have been consulted.     Informant(s) for H&P: patient     REVIEW OF SYSTEMS:  A comprehensive review of systems was negative except for: what is in the HPI      PMH:  Past Medical History:   Diagnosis Date    CAD (coronary artery disease)     with stents - Dr. Martinez yearly     Cancer (HCC)     appendix    Chronic back pain     Enlarged prostate     GERD (gastroesophageal reflux disease)     Hearing loss     hearing aids    Hyperlipidemia     Hypertension     Insomnia     PONV (postoperative nausea and vomiting)     Spondylosis     L4&5    Urinary incontinence     Ventricular ectopic beats 04/12/2013       Surgical History:  Past Surgical History:   Procedure Laterality Date    APPENDECTOMY  2/2023    COLECTOMY Right 03/22/2023    LAPAROSCOPIC ROBOTIC XI ASSISTED RIGHT HEMICOLECTOMY performed by Indira Arce MD at [facility] OR    COLONOSCOPY      COLONOSCOPY N/A 03/15/2023    COLONOSCOPY WITH BIOPSY performed by Indira Arce MD at [facility] ENDOSCOPY    COLONOSCOPY N/A 3/15/2024    COLONOSCOPY BIOPSY  Dorsalis Pedis pulses bilaterally.  Neuro:  Cranial nerves 2-12 grossly intact, no focal weakness or change in sensation noted.  Extraocular muscles intact.  Pupils equal, round, reactive to light.        I agree with the assessment and plan of CHRISTEN Jeffrey - NP    46 min spent reviewing records, interviewing/examining pt, analyzing data, discussing with nursing, formulating treatment plan as noted in NP's note.     fever  Right hip prosthetic joint infection  Antibiotic associated diarrhea  Htn  Hyperlipidemia  depression    Will hold off diuretic as pt with insensible losses of fever and diarrhea. Consider iv fluids.       Decision to admit    Electronically signed by Ze Arrington D.O.  Hospitalist  [facility] at [facility]

## 2024-08-23 NOTE — CONSULTS
[facility]  [facility]  Consultation Note     Admit Date: 8/23/2024  4:56 AM    Reason for Consult:   Fever    Attending Physician:  No att. providers found    HISTORY OF PRESENT ILLNESS:             The history is obtained from extensive review of available past medical records. The patient is a 73 y.o. male who is previously known to the ID service.    The patient had a right total hip arthroplasty in 2010.  He was recently admitted to [facility] on 8/5/2024 with sudden onset of shaking chills and fever associated to intense right hip pain.  Had a CT-guided aspiration of the hip.  Underwent polyethylene and femoral head exchange.  He was treated with Ceftriaxone, followed by Daptomycin. Cultures grew Streptococcus pasteurianus.  He was discharged on Ceftriaxone on 8/9/2024.    The patient came back to the ED at [facility] because he had a temperature of 102.7 °F at home.  This was associated to diarrhea, body aches.  The patient says that he was having fevers shortly after the infusion of Ceftriaxone.  He is complaining of chills because of the Eliquis.  His right hip actually feels good.  No pain at the PICC insertion site.    Past Medical History:    8/5/2024.  Admitted to [facility] with sudden onset of shaking chills and fever associated to intense right hip pain.  Had a CT-guided aspiration of the hip.  Underwent polyethylene and femoral head exchange.  He was treated with Ceftriaxone, followed by Daptomycin. Cultures grew Streptococcus pasteurianus.  He was discharged on Ceftriaxone.        Diagnosis Date    CAD (coronary artery disease)     with stents - Dr. Martinez yearly     Cancer (HCC)     appendix    Chronic back pain     Enlarged prostate     GERD (gastroesophageal reflux disease)     Hearing loss     hearing aids    Hyperlipidemia     Hypertension     Insomnia     PONV (postoperative nausea and vomiting)     Spondylosis     L4&5    Urinary incontinence

## 2024-08-23 NOTE — DISCHARGE INSTRUCTIONS
Follow-up closely with ID and Ortho.  Return for any significant worsening symptoms or other acute symptoms or concerns.

## 2024-08-23 NOTE — PROGRESS NOTES
.4 Eyes Skin Assessment     NAME:  Edilberto Hillman  YOB: 1950  MEDICAL RECORD NUMBER:  [id number]    The patient is being assessed for  Admission    I agree that at least one RN has performed a thorough Head to Toe Skin Assessment on the patient. ALL assessment sites listed below have been assessed.      Areas assessed by both nurses:    Head, Face, Ears, Shoulders, Back, Chest, Arms, Elbows, Hands, Sacrum. Buttock, Coccyx, Ischium, Legs. Feet and Heels, and Under Medical Devices         Does the Patient have a Wound? No, Incision       Edward Prevention initiated by RN: No  Wound Care Orders initiated by RN: No    Pressure Injury (Stage 3,4, Unstageable, DTI, NWPT, and Complex wounds) if present, place Wound referral order by RN under : No    New Ostomies, if present place, Ostomy referral order under : No     Nurse 1 eSignature: Electronically signed by Inna Deng RN on 8/23/24 at 1:55 PM EDT    **SHARE this note so that the co-signing nurse can place an eSignature**    Nurse 2 eSignature: Electronically signed by Cristal Calvo RN on 8/23/24 at 1:56 PM EDT    electronic

## 2024-08-24 LAB
ALBUMIN SERPL-MCNC: 3.1 G/DL (ref 3.5–5.2)
ALP SERPL-CCNC: 61 U/L (ref 40–129)
ALT SERPL-CCNC: 12 U/L (ref 0–40)
ANION GAP SERPL CALCULATED.3IONS-SCNC: 11 MMOL/L (ref 7–16)
AST SERPL-CCNC: 21 U/L (ref 0–39)
BASOPHILS # BLD: 0.02 K/UL (ref 0–0.2)
BASOPHILS NFR BLD: 0 % (ref 0–2)
BILIRUB SERPL-MCNC: 0.4 MG/DL (ref 0–1.2)
BUN SERPL-MCNC: 18 MG/DL (ref 6–23)
CALCIUM SERPL-MCNC: 8.5 MG/DL (ref 8.6–10.2)
CHLORIDE SERPL-SCNC: 100 MMOL/L (ref 98–107)
CO2 SERPL-SCNC: 25 MMOL/L (ref 22–29)
CREAT SERPL-MCNC: 1.1 MG/DL (ref 0.7–1.2)
EOSINOPHIL # BLD: 0.03 K/UL (ref 0.05–0.5)
EOSINOPHILS RELATIVE PERCENT: 1 % (ref 0–6)
ERYTHROCYTE [DISTWIDTH] IN BLOOD BY AUTOMATED COUNT: 13.4 % (ref 11.5–15)
GFR, ESTIMATED: 72 ML/MIN/1.73M2
GLUCOSE SERPL-MCNC: 104 MG/DL (ref 74–99)
HCT VFR BLD AUTO: 31.7 % (ref 37–54)
HGB BLD-MCNC: 10.4 G/DL (ref 12.5–16.5)
IMM GRANULOCYTES # BLD AUTO: <0.03 K/UL (ref 0–0.58)
IMM GRANULOCYTES NFR BLD: 0 % (ref 0–5)
LYMPHOCYTES NFR BLD: 0.8 K/UL (ref 1.5–4)
LYMPHOCYTES RELATIVE PERCENT: 17 % (ref 20–42)
MAGNESIUM SERPL-MCNC: 2.1 MG/DL (ref 1.6–2.6)
MCH RBC QN AUTO: 28.5 PG (ref 26–35)
MCHC RBC AUTO-ENTMCNC: 32.8 G/DL (ref 32–34.5)
MCV RBC AUTO: 86.8 FL (ref 80–99.9)
MICROORGANISM SPEC CULT: ABNORMAL
MONOCYTES NFR BLD: 0.53 K/UL (ref 0.1–0.95)
MONOCYTES NFR BLD: 11 % (ref 2–12)
NEUTROPHILS NFR BLD: 71 % (ref 43–80)
NEUTS SEG NFR BLD: 3.33 K/UL (ref 1.8–7.3)
PHOSPHATE SERPL-MCNC: 3.6 MG/DL (ref 2.5–4.5)
PLATELET # BLD AUTO: 191 K/UL (ref 130–450)
PMV BLD AUTO: 10 FL (ref 7–12)
POTASSIUM SERPL-SCNC: 3.7 MMOL/L (ref 3.5–5)
PROT SERPL-MCNC: 6.5 G/DL (ref 6.4–8.3)
RBC # BLD AUTO: 3.65 M/UL (ref 3.8–5.8)
SERVICE CMNT-IMP: ABNORMAL
SODIUM SERPL-SCNC: 136 MMOL/L (ref 132–146)
SPECIMEN DESCRIPTION: ABNORMAL
WBC OTHER # BLD: 4.7 K/UL (ref 4.5–11.5)

## 2024-08-24 PROCEDURE — 85025 COMPLETE CBC W/AUTO DIFF WBC: CPT

## 2024-08-24 PROCEDURE — 99232 SBSQ HOSP IP/OBS MODERATE 35: CPT | Performed by: INTERNAL MEDICINE

## 2024-08-24 PROCEDURE — 84100 ASSAY OF PHOSPHORUS: CPT

## 2024-08-24 PROCEDURE — G0378 HOSPITAL OBSERVATION PER HR: HCPCS

## 2024-08-24 PROCEDURE — 80053 COMPREHEN METABOLIC PANEL: CPT

## 2024-08-24 PROCEDURE — 83735 ASSAY OF MAGNESIUM: CPT

## 2024-08-24 PROCEDURE — 6370000000 HC RX 637 (ALT 250 FOR IP)

## 2024-08-24 PROCEDURE — 87040 BLOOD CULTURE FOR BACTERIA: CPT

## 2024-08-24 PROCEDURE — 6370000000 HC RX 637 (ALT 250 FOR IP): Performed by: INTERNAL MEDICINE

## 2024-08-24 PROCEDURE — 2580000003 HC RX 258: Performed by: INTERNAL MEDICINE

## 2024-08-24 RX ADMIN — SERTRALINE HYDROCHLORIDE 50 MG: 50 TABLET ORAL at 09:05

## 2024-08-24 RX ADMIN — SUCRALFATE 1 G: 1 TABLET ORAL at 20:52

## 2024-08-24 RX ADMIN — APIXABAN 2.5 MG: 2.5 TABLET, FILM COATED ORAL at 09:05

## 2024-08-24 RX ADMIN — APIXABAN 2.5 MG: 2.5 TABLET, FILM COATED ORAL at 20:52

## 2024-08-24 RX ADMIN — ACETAMINOPHEN 650 MG: 325 TABLET ORAL at 05:28

## 2024-08-24 RX ADMIN — SODIUM CHLORIDE, PRESERVATIVE FREE 10 ML: 5 INJECTION INTRAVENOUS at 09:09

## 2024-08-24 RX ADMIN — LOSARTAN POTASSIUM 100 MG: 50 TABLET, FILM COATED ORAL at 09:05

## 2024-08-24 RX ADMIN — SODIUM CHLORIDE, PRESERVATIVE FREE 10 ML: 5 INJECTION INTRAVENOUS at 20:52

## 2024-08-24 RX ADMIN — POLYETHYLENE GLYCOL 3350 17 G: 17 POWDER, FOR SOLUTION ORAL at 20:51

## 2024-08-24 RX ADMIN — Medication 2500 UNITS: at 09:04

## 2024-08-24 RX ADMIN — ROSUVASTATIN CALCIUM 40 MG: 20 TABLET, FILM COATED ORAL at 20:51

## 2024-08-24 RX ADMIN — CLONAZEPAM 0.5 MG: 0.5 TABLET ORAL at 20:52

## 2024-08-24 RX ADMIN — ACETAMINOPHEN 650 MG: 325 TABLET ORAL at 20:52

## 2024-08-24 RX ADMIN — AMLODIPINE BESYLATE 5 MG: 5 TABLET ORAL at 20:51

## 2024-08-24 ASSESSMENT — PAIN SCALES - GENERAL: PAINLEVEL_OUTOF10: 0

## 2024-08-24 NOTE — PLAN OF CARE
Problem: ABCDS Injury Assessment  Goal: Absence of physical injury  8/23/2024 2101 by Mirian Veloz RN  Outcome: Progressing  Flowsheets (Taken 8/23/2024 1930)  Absence of Physical Injury: Implement safety measures based on patient assessment  8/23/2024 1356 by Inna Deng RN  Outcome: Progressing     Problem: Discharge Planning  Goal: Discharge to home or other facility with appropriate resources  8/23/2024 2101 by Mirian Veloz RN  Outcome: Progressing  8/23/2024 1356 by Inna Deng RN  Outcome: Progressing  Flowsheets (Taken 8/23/2024 1229 by Deepa Cohen, RN)  Discharge to home or other facility with appropriate resources: Refer to discharge planning if patient needs post-hospital services based on physician order or complex needs related to functional status, cognitive ability or social support system     Problem: Pain  Goal: Verbalizes/displays adequate comfort level or baseline comfort level  8/23/2024 2101 by Mirian Veloz RN  Outcome: Progressing  8/23/2024 1356 by Inna Deng RN  Outcome: Progressing     Problem: Safety - Adult  Goal: Free from fall injury  8/23/2024 2101 by Mirian Veloz RN  Outcome: Progressing  Flowsheets (Taken 8/23/2024 1930)  Free From Fall Injury: Instruct family/caregiver on patient safety  8/23/2024 1356 by Inna Deng RN  Outcome: Progressing

## 2024-08-24 NOTE — PROGRESS NOTES
Elizabeth KILLIAN made aware of elevated temp and bp. No further orders at this time. NP stated, \" lets get the temperature down first and recheck a blood pressure\".

## 2024-08-24 NOTE — PROGRESS NOTES
RDW 13.4 13.4    191   MPV 9.9 10.0       CBC with Differential:    Lab Results   Component Value Date/Time    WBC 4.7 08/24/2024 01:45 AM    RBC 3.65 08/24/2024 01:45 AM    RBC 4.84 10/11/2022 09:16 AM    HGB 10.4 08/24/2024 01:45 AM    HCT 31.7 08/24/2024 01:45 AM     08/24/2024 01:45 AM    MCV 86.8 08/24/2024 01:45 AM    MCH 28.5 08/24/2024 01:45 AM    MCHC 32.8 08/24/2024 01:45 AM    RDW 13.4 08/24/2024 01:45 AM    LYMPHOPCT 17 08/24/2024 01:45 AM    LYMPHOPCT 29.9 10/11/2022 09:16 AM    MONOPCT 11 08/24/2024 01:45 AM    EOSPCT 1 08/24/2024 01:45 AM    BASOPCT 0 08/24/2024 01:45 AM    MONOSABS 0.53 08/24/2024 01:45 AM    LYMPHSABS 0.80 08/24/2024 01:45 AM    EOSABS 0.03 08/24/2024 01:45 AM    BASOSABS 0.02 08/24/2024 01:45 AM     CMP:    Lab Results   Component Value Date/Time     08/24/2024 01:45 AM    K 3.7 08/24/2024 01:45 AM    K 3.8 03/25/2023 02:48 AM     08/24/2024 01:45 AM    CO2 25 08/24/2024 01:45 AM    BUN 18 08/24/2024 01:45 AM    CREATININE 1.1 08/24/2024 01:45 AM    GFRAA >60 12/16/2021 08:48 AM    LABGLOM 72 08/24/2024 01:45 AM    LABGLOM 71 04/25/2024 09:59 AM    GLUCOSE 104 08/24/2024 01:45 AM    GLUCOSE 94 10/11/2022 09:16 AM    CALCIUM 8.5 08/24/2024 01:45 AM    BILITOT 0.4 08/24/2024 01:45 AM    ALKPHOS 61 08/24/2024 01:45 AM    AST 21 08/24/2024 01:45 AM    ALT 12 08/24/2024 01:45 AM     Magnesium:    Lab Results   Component Value Date/Time    MG 2.1 08/24/2024 01:45 AM     Phosphorus:    Lab Results   Component Value Date/Time    PHOS 3.6 08/24/2024 01:45 AM        Radiology:   CT HIP RIGHT W CONTRAST   Final Result   1. Bilateral total hip arthroplasties without evidence for complication such   as hardware loosening or failure.   2. Mild fluid along the right femoral greater trochanter, consistent with   postsurgical seroma. No gas.         CT ABDOMEN PELVIS W IV CONTRAST Additional Contrast? None   Final Result   1. No acute intra-abdominal or pelvic process.    2. Proximal colectomy.   3. Mild colonic diverticulosis.   4. Mild gallbladder sludge.   5. Small, fatty umbilical hernia.   6. Chronic bilateral L5 pars defects with mild degenerative anterolisthesis.         XR CHEST PORTABLE   Final Result   1. No acute cardiopulmonary process.   2. Right arm PICC tip with good positioning SVC.             Assessment:    Principal Problem:    Postoperative fever  Active Problems:    Fever    Depression    Hyperlipidemia  Resolved Problems:    * No resolved hospital problems. 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 Ortho and ID following  Antibiotic associated diarrhea monitor. Pt notes no current diarrhea  Htn continue med   Hyperlipidemia continue med  Depression continue med        Electronically signed by Ze Arrington DO on 8/24/2024 at 5:11 PM

## 2024-08-24 NOTE — PROGRESS NOTES
[facility]  [facility]  Progress Note    SUBJECTIVE:  Chief Complaint   Patient presents with    Post-op Problem     Pt had an infection taken out of his right hip two weeks ago. Receiving IV ATB at home. Follows with Dr. Jones (surgeon) and Dr. Santo (ID)    Fever     102.7 fever at home took 1000mg Tylenol at 9pm    Diarrhea     Patient is tolerating medications. No reported adverse drug reactions.  No nausea, vomiting, NO loose BM    Review of systems:  As stated above in the chief complaint, otherwise negative.    Medications:  Scheduled Meds:   amLODIPine  5 mg Oral Nightly    losartan  100 mg Oral Daily    rosuvastatin  40 mg Oral Nightly    sertraline  50 mg Oral Daily    sucralfate  1 g Oral 4x Daily AC & HS    Vitamin D  2,500 Units Oral Daily    sodium chloride flush  5-40 mL IntraVENous 2 times per day    apixaban  2.5 mg Oral BID    [START ON 2024] aspirin  81 mg Oral Daily     Continuous Infusions:   sodium chloride       PRN Meds:pantoprazole, sodium chloride flush, sodium chloride, ondansetron **OR** ondansetron, polyethylene glycol, acetaminophen **OR** acetaminophen, traMADol, clonazePAM    OBJECTIVE:  /63   Pulse 52   Temp 97.8 °F (36.6 °C) (Oral)   Resp 18   Ht 1.829 m (6')   Wt 94.3 kg (208 lb)   SpO2 98%   BMI 28.21 kg/m²   Temp  Av.8 °F (37.7 °C)  Min: 97.8 °F (36.6 °C)  Max: 101.4 °F (38.6 °C)  Constitutional: The patient is awake, alert, and oriented.   Skin: Warm and dry. No rashes were noted.   HEENT: Round and reactive pupils.  Moist mucous membranes.  No ulcerations or thrush.  Neck: Supple to movements.   Chest: No use of accessory muscles to breathe. Symmetrical expansion.  No wheezing, crackles or rhonchi.  Cardiovascular: S1 and S2 are rhythmic and regular. No murmurs appreciated.   Abdomen: Positive bowel sounds to auscultation. Benign to palpation. No masses felt. No hepatosplenomegaly.  Extremities: No clubbing, no  admitted and there was a consult    CHRISTEN Gomez  10:33 AM  8/24/2024

## 2024-08-24 NOTE — PROGRESS NOTES
P Quality Flow/Interdisciplinary Rounds Progress Note        Quality Flow Rounds held on August 24, 2024    Disciplines Attending:  Bedside Nurse and Nursing Unit Leadership    Edilberto Hillman was admitted on 8/23/2024  4:56 AM    Anticipated Discharge Date:       Disposition:    Edward Score:  Edward Scale Score: 21    Readmission Risk              Risk of Unplanned Readmission:  0           Discussed patient goal for the day, patient clinical progression, and barriers to discharge.  The following Goal(s) of the Day/Commitment(s) have been identified:   send stool for cdiff.      Kiarra Valerio RN  August 24, 2024

## 2024-08-25 ENCOUNTER — APPOINTMENT (OUTPATIENT)
Dept: GENERAL RADIOLOGY | Age: 74
DRG: 864 | End: 2024-08-25
Payer: MEDICARE

## 2024-08-25 LAB
ALBUMIN SERPL-MCNC: 3.5 G/DL (ref 3.5–5.2)
ALP SERPL-CCNC: 65 U/L (ref 40–129)
ALT SERPL-CCNC: 18 U/L (ref 0–40)
ANION GAP SERPL CALCULATED.3IONS-SCNC: 13 MMOL/L (ref 7–16)
AST SERPL-CCNC: 25 U/L (ref 0–39)
BASOPHILS # BLD: 0.02 K/UL (ref 0–0.2)
BASOPHILS NFR BLD: 0 % (ref 0–2)
BILIRUB SERPL-MCNC: 0.4 MG/DL (ref 0–1.2)
BUN SERPL-MCNC: 22 MG/DL (ref 6–23)
CALCIUM SERPL-MCNC: 8.8 MG/DL (ref 8.6–10.2)
CHLORIDE SERPL-SCNC: 101 MMOL/L (ref 98–107)
CO2 SERPL-SCNC: 24 MMOL/L (ref 22–29)
CREAT SERPL-MCNC: 1.1 MG/DL (ref 0.7–1.2)
EOSINOPHIL # BLD: 0.06 K/UL (ref 0.05–0.5)
EOSINOPHILS RELATIVE PERCENT: 1 % (ref 0–6)
ERYTHROCYTE [DISTWIDTH] IN BLOOD BY AUTOMATED COUNT: 13.5 % (ref 11.5–15)
GFR, ESTIMATED: 75 ML/MIN/1.73M2
GLUCOSE SERPL-MCNC: 131 MG/DL (ref 74–99)
HCT VFR BLD AUTO: 34.5 % (ref 37–54)
HGB BLD-MCNC: 11.1 G/DL (ref 12.5–16.5)
IMM GRANULOCYTES # BLD AUTO: <0.03 K/UL (ref 0–0.58)
IMM GRANULOCYTES NFR BLD: 0 % (ref 0–5)
LYMPHOCYTES NFR BLD: 1.46 K/UL (ref 1.5–4)
LYMPHOCYTES RELATIVE PERCENT: 25 % (ref 20–42)
MCH RBC QN AUTO: 28.5 PG (ref 26–35)
MCHC RBC AUTO-ENTMCNC: 32.2 G/DL (ref 32–34.5)
MCV RBC AUTO: 88.7 FL (ref 80–99.9)
MICROORGANISM SPEC CULT: NORMAL
MICROORGANISM/AGENT SPEC: NORMAL
MONOCYTES NFR BLD: 0.79 K/UL (ref 0.1–0.95)
MONOCYTES NFR BLD: 13 % (ref 2–12)
NEUTROPHILS NFR BLD: 60 % (ref 43–80)
NEUTS SEG NFR BLD: 3.56 K/UL (ref 1.8–7.3)
PLATELET # BLD AUTO: 192 K/UL (ref 130–450)
PMV BLD AUTO: 9.9 FL (ref 7–12)
POTASSIUM SERPL-SCNC: 3.7 MMOL/L (ref 3.5–5)
PROT SERPL-MCNC: 7.1 G/DL (ref 6.4–8.3)
RBC # BLD AUTO: 3.89 M/UL (ref 3.8–5.8)
SERVICE CMNT-IMP: NORMAL
SODIUM SERPL-SCNC: 138 MMOL/L (ref 132–146)
SPECIMEN DESCRIPTION: NORMAL
WBC OTHER # BLD: 5.9 K/UL (ref 4.5–11.5)

## 2024-08-25 PROCEDURE — 6370000000 HC RX 637 (ALT 250 FOR IP)

## 2024-08-25 PROCEDURE — 85025 COMPLETE CBC W/AUTO DIFF WBC: CPT

## 2024-08-25 PROCEDURE — 6370000000 HC RX 637 (ALT 250 FOR IP): Performed by: INTERNAL MEDICINE

## 2024-08-25 PROCEDURE — G0378 HOSPITAL OBSERVATION PER HR: HCPCS

## 2024-08-25 PROCEDURE — 80053 COMPREHEN METABOLIC PANEL: CPT

## 2024-08-25 PROCEDURE — 99232 SBSQ HOSP IP/OBS MODERATE 35: CPT | Performed by: INTERNAL MEDICINE

## 2024-08-25 PROCEDURE — 74018 RADEX ABDOMEN 1 VIEW: CPT

## 2024-08-25 RX ORDER — DOCUSATE SODIUM 100 MG/1
100 CAPSULE, LIQUID FILLED ORAL DAILY
Status: DISCONTINUED | OUTPATIENT
Start: 2024-08-25 | End: 2024-08-26 | Stop reason: HOSPADM

## 2024-08-25 RX ADMIN — SUCRALFATE 1 G: 1 TABLET ORAL at 20:13

## 2024-08-25 RX ADMIN — LOSARTAN POTASSIUM 100 MG: 50 TABLET, FILM COATED ORAL at 08:27

## 2024-08-25 RX ADMIN — AMLODIPINE BESYLATE 5 MG: 5 TABLET ORAL at 20:13

## 2024-08-25 RX ADMIN — SUCRALFATE 1 G: 1 TABLET ORAL at 17:18

## 2024-08-25 RX ADMIN — SERTRALINE HYDROCHLORIDE 50 MG: 50 TABLET ORAL at 08:27

## 2024-08-25 RX ADMIN — CLONAZEPAM 0.5 MG: 0.5 TABLET ORAL at 20:13

## 2024-08-25 RX ADMIN — ASPIRIN 81 MG: 81 TABLET, COATED ORAL at 08:27

## 2024-08-25 RX ADMIN — Medication 2500 UNITS: at 08:27

## 2024-08-25 RX ADMIN — POLYETHYLENE GLYCOL 3350 17 G: 17 POWDER, FOR SOLUTION ORAL at 10:54

## 2024-08-25 RX ADMIN — DOCUSATE SODIUM 100 MG: 100 CAPSULE, LIQUID FILLED ORAL at 17:18

## 2024-08-25 RX ADMIN — SUCRALFATE 1 G: 1 TABLET ORAL at 10:50

## 2024-08-25 ASSESSMENT — PAIN SCALES - GENERAL
PAINLEVEL_OUTOF10: 0
PAINLEVEL_OUTOF10: 0

## 2024-08-25 NOTE — PROGRESS NOTES
P Quality Flow/Interdisciplinary Rounds Progress Note        Quality Flow Rounds held on August 25, 2024    Disciplines Attending:  Bedside Nurse and Nursing Unit Leadership    Edilberto Hillman was admitted on 8/23/2024  4:56 AM    Anticipated Discharge Date:       Disposition:    Edward Score:  Edward Scale Score: 22    Readmission Risk              Risk of Unplanned Readmission:  0           Discussed patient goal for the day, patient clinical progression, and barriers to discharge.  The following Goal(s) of the Day/Commitment(s) have been identified:   send stool, , monitor VS.       Kiarra Valerio RN  August 25, 2024

## 2024-08-25 NOTE — PROGRESS NOTES
[facility]  [facility]  Progress Note    SUBJECTIVE:  Chief Complaint   Patient presents with    Post-op Problem     Pt had an infection taken out of his right hip two weeks ago. Receiving IV ATB at home. Follows with Dr. Jones (surgeon) and Dr. Santo (ID)    Fever     102.7 fever at home took 1000mg Tylenol at 9pm    Diarrhea     Patient is tolerating medications. No reported adverse drug reactions.  No nausea, vomiting, denies any loose BM.  Actually stated he has been constipated a couple of days    Review of systems:  As stated above in the chief complaint, otherwise negative.    Medications:  Scheduled Meds:   amLODIPine  5 mg Oral Nightly    losartan  100 mg Oral Daily    rosuvastatin  40 mg Oral Nightly    sertraline  50 mg Oral Daily    sucralfate  1 g Oral 4x Daily AC & HS    Vitamin D  2,500 Units Oral Daily    sodium chloride flush  5-40 mL IntraVENous 2 times per day    aspirin  81 mg Oral Daily     Continuous Infusions:   sodium chloride       PRN Meds:pantoprazole, sodium chloride flush, sodium chloride, ondansetron **OR** ondansetron, polyethylene glycol, acetaminophen **OR** acetaminophen, traMADol, clonazePAM    OBJECTIVE:  /71   Pulse 51   Temp 98.4 °F (36.9 °C) (Oral)   Resp 16   Ht 1.829 m (6')   Wt 94.3 kg (208 lb)   SpO2 94%   BMI 28.21 kg/m²   Temp  Av.7 °F (37.6 °C)  Min: 98.3 °F (36.8 °C)  Max: 101.9 °F (38.8 °C)  Constitutional: The patient is awake, alert, and oriented.  Family in the room.  Pleasant cooperative talkative in bed.  Skin: Warm and dry. No rashes were noted.   HEENT: Round and reactive pupils.  Moist mucous membranes.  No ulcerations or thrush.  Neck: Supple to movements.   Chest: No use of accessory muscles to breathe. Symmetrical expansion.  No wheezing, crackles or rhonchi.  Cardiovascular: S1 and S2 are rhythmic and regular. No murmurs appreciated.   Abdomen: Positive bowel sounds to auscultation. Benign to  palpation. No masses felt. No hepatosplenomegaly.  Extremities: No clubbing, no cyanosis, no edema.  Right Hip surgical site is dry  Lines: Peripheral left antecubital  Right arm PICC    Laboratory and Tests:  Lab Results   Component Value Date    CRP 74.0 (H) 08/23/2024    CRP 91.0 (H) 08/23/2024    CRP 50.0 (H) 08/20/2024     Lab Results   Component Value Date    SEDRATE 83 (H) 08/23/2024    SEDRATE 83 (H) 08/23/2024    SEDRATE 80 (H) 08/20/2024     1. Bilateral total hip arthroplasties without evidence for complication such   as hardware loosening or failure.   2. Mild fluid along the right femoral greater trochanter, consistent with   postsurgical seroma. No gas.       Microbiology:  Surgical culture 8/7/2024 Streptococcus species  Body fluid culture hip 8/6/2024: Streptococcus pasteurianus   Blood cultures 8/5/2024: negative      8/23  Urine GPO  8/23 blood culture sent negative so far  Respiratory array sent-negative  COVID-19 negative, influenza AB-  Cdiff - cancel   Repeat blood culture from the PICC 8/24 sent, negative so far     Assessment:  Right hip prosthetic joint infection with Streptococcus  Status post polyethylene and femoral head exchange 8/7/2024  New onset fever.  The differential would include CLABSI, drug-induced fever or a superimposed viral infection.  Eosinophils are normal and he does not have a rash, therefore he is not meeting the classic triad for drug-induced fever but this is only an 25% of the cases  Antibiotic associated diarrhea.  Possible C. difficile infection  Fevers seem to have trended down over the last 24 hours  Constipation     Plan:    Observation-he was on ceftriaxone from the last hospital DC 8/9 forward.  Off now and follow  labs  Consult to ortho - will ask today  8/24 set of blood cultures from the PICC--if still having fevers will discuss pulling  CX  Cancel C. difficile right now patient has not had a BM in about 2 or 3 days and feels constipated.  House team to

## 2024-08-25 NOTE — PROGRESS NOTES
Call placed to Dr. Jones per ID request. Notified of admission/pt to be seen ortho inpt or outpt f/u?

## 2024-08-25 NOTE — PLAN OF CARE
Problem: ABCDS Injury Assessment  Goal: Absence of physical injury  8/24/2024 2244 by Orquidea Toscano RN  Outcome: Progressing  8/24/2024 1201 by Inna Deng RN  Outcome: Progressing  8/24/2024 1200 by Inna Deng RN  Outcome: Progressing     Problem: Discharge Planning  Goal: Discharge to home or other facility with appropriate resources  8/24/2024 2244 by Orquidea Toscano RN  Outcome: Progressing  Flowsheets (Taken 8/24/2024 2000)  Discharge to home or other facility with appropriate resources: Identify barriers to discharge with patient and caregiver  8/24/2024 1201 by Inna Deng RN  Outcome: Progressing  8/24/2024 1200 by Inna Deng RN  Outcome: Progressing     Problem: Pain  Goal: Verbalizes/displays adequate comfort level or baseline comfort level  8/24/2024 2244 by Orquidea Toscano RN  Outcome: Progressing  8/24/2024 1201 by Inna Deng RN  Outcome: Progressing  8/24/2024 1200 by Inna Deng RN  Outcome: Progressing     Problem: Safety - Adult  Goal: Free from fall injury  8/24/2024 2244 by Orquidea Toscano RN  Outcome: Progressing  8/24/2024 1201 by Inna Deng RN  Outcome: Progressing  8/24/2024 1200 by Inna Deng RN  Outcome: Progressing

## 2024-08-25 NOTE — PROGRESS NOTES
[facility]   Progress Note    Admitting Date and Time: 8/23/2024  4:56 AM  Admit Dx: Postoperative fever [R50.82]  Fever, unspecified fever cause [R50.9]  Diarrhea, unspecified type [R19.7]    Subjective:    Patient was admitted with Postoperative fever [R50.82]  Fever, unspecified fever cause [R50.9]  Diarrhea, unspecified type [R19.7]. Patient denies fever, chills, cp, sob, n/v.     [START ON 8/26/2024] DAPTOmycin (CUBICIN) 750 mg in sodium chloride (PF) 0.9 % 15 mL IV syringe  8 mg/kg IntraVENous Q24H    amLODIPine  5 mg Oral Nightly    losartan  100 mg Oral Daily    [Held by provider] rosuvastatin  40 mg Oral Nightly    sertraline  50 mg Oral Daily    sucralfate  1 g Oral 4x Daily AC & HS    Vitamin D  2,500 Units Oral Daily    sodium chloride flush  5-40 mL IntraVENous 2 times per day    aspirin  81 mg Oral Daily     pantoprazole, 40 mg, Daily PRN  sodium chloride flush, 5-40 mL, PRN  sodium chloride, , PRN  ondansetron, 4 mg, Q8H PRN   Or  ondansetron, 4 mg, Q6H PRN  polyethylene glycol, 17 g, Daily PRN  acetaminophen, 650 mg, Q6H PRN   Or  acetaminophen, 650 mg, Q6H PRN  traMADol, 50 mg, Q6H PRN  clonazePAM, 0.5 mg, Nightly PRN         Objective:    /71   Pulse 54   Temp 98.6 °F (37 °C) (Oral)   Resp 18   Ht 1.829 m (6')   Wt 94.3 kg (208 lb)   SpO2 98%   BMI 28.21 kg/m²   Skin: warm and dry, no rash or erythema  Pulmonary/Chest: clear to auscultation bilaterally- no wheezes, rales or rhonchi, normal air movement, no respiratory distress  Cardiovascular: rhythm reg at rate of 58  Abdomen: soft, non-tender, non-distended, normal bowel sounds, no masses or organomegaly  Extremities: no cyanosis, no clubbing, and no edema      Recent Labs     08/23/24  0117 08/24/24  0145 08/25/24  0200    136 138   K 4.0 3.7 3.7   CL 99 100 101   CO2 27 25 24   BUN 20 18 22   CREATININE 1.1 1.1 1.1   GLUCOSE 98 104* 131*   CALCIUM 9.0 8.5* 8.8       Recent Labs     08/23/24  0117  08/24/24  0145 08/25/24  0200   WBC 6.1 4.7 5.9   RBC 4.25 3.65* 3.89   HGB 12.1* 10.4* 11.1*   HCT 38.5 31.7* 34.5*   MCV 90.6 86.8 88.7   MCH 28.5 28.5 28.5   MCHC 31.4* 32.8 32.2   RDW 13.4 13.4 13.5    191 192   MPV 9.9 10.0 9.9       CBC with Differential:    Lab Results   Component Value Date/Time    WBC 5.9 08/25/2024 02:00 AM    RBC 3.89 08/25/2024 02:00 AM    RBC 4.84 10/11/2022 09:16 AM    HGB 11.1 08/25/2024 02:00 AM    HCT 34.5 08/25/2024 02:00 AM     08/25/2024 02:00 AM    MCV 88.7 08/25/2024 02:00 AM    MCH 28.5 08/25/2024 02:00 AM    MCHC 32.2 08/25/2024 02:00 AM    RDW 13.5 08/25/2024 02:00 AM    LYMPHOPCT 25 08/25/2024 02:00 AM    LYMPHOPCT 29.9 10/11/2022 09:16 AM    MONOPCT 13 08/25/2024 02:00 AM    EOSPCT 1 08/25/2024 02:00 AM    BASOPCT 0 08/25/2024 02:00 AM    MONOSABS 0.79 08/25/2024 02:00 AM    LYMPHSABS 1.46 08/25/2024 02:00 AM    EOSABS 0.06 08/25/2024 02:00 AM    BASOSABS 0.02 08/25/2024 02:00 AM     CMP:    Lab Results   Component Value Date/Time     08/25/2024 02:00 AM    K 3.7 08/25/2024 02:00 AM    K 3.8 03/25/2023 02:48 AM     08/25/2024 02:00 AM    CO2 24 08/25/2024 02:00 AM    BUN 22 08/25/2024 02:00 AM    CREATININE 1.1 08/25/2024 02:00 AM    GFRAA >60 12/16/2021 08:48 AM    LABGLOM 75 08/25/2024 02:00 AM    LABGLOM 71 04/25/2024 09:59 AM    GLUCOSE 131 08/25/2024 02:00 AM    GLUCOSE 94 10/11/2022 09:16 AM    CALCIUM 8.8 08/25/2024 02:00 AM    BILITOT 0.4 08/25/2024 02:00 AM    ALKPHOS 65 08/25/2024 02:00 AM    AST 25 08/25/2024 02:00 AM    ALT 18 08/25/2024 02:00 AM        Radiology:   CT HIP RIGHT W CONTRAST   Final Result   1. Bilateral total hip arthroplasties without evidence for complication such   as hardware loosening or failure.   2. Mild fluid along the right femoral greater trochanter, consistent with   postsurgical seroma. No gas.         CT ABDOMEN PELVIS W IV CONTRAST Additional Contrast? None   Final Result   1. No acute intra-abdominal or

## 2024-08-26 VITALS
TEMPERATURE: 98.2 F | HEART RATE: 53 BPM | OXYGEN SATURATION: 98 % | HEIGHT: 72 IN | RESPIRATION RATE: 18 BRPM | DIASTOLIC BLOOD PRESSURE: 75 MMHG | SYSTOLIC BLOOD PRESSURE: 126 MMHG | WEIGHT: 208 LBS | BODY MASS INDEX: 28.17 KG/M2

## 2024-08-26 LAB
ALBUMIN SERPL-MCNC: 3.1 G/DL (ref 3.5–5.2)
ALP SERPL-CCNC: 58 U/L (ref 40–129)
ALT SERPL-CCNC: 16 U/L (ref 0–40)
ANION GAP SERPL CALCULATED.3IONS-SCNC: 10 MMOL/L (ref 7–16)
AST SERPL-CCNC: 23 U/L (ref 0–39)
BASOPHILS # BLD: 0.02 K/UL (ref 0–0.2)
BASOPHILS NFR BLD: 0 % (ref 0–2)
BILIRUB SERPL-MCNC: 0.4 MG/DL (ref 0–1.2)
BUN SERPL-MCNC: 23 MG/DL (ref 6–23)
CALCIUM SERPL-MCNC: 8.5 MG/DL (ref 8.6–10.2)
CHLORIDE SERPL-SCNC: 102 MMOL/L (ref 98–107)
CK SERPL-CCNC: 35 U/L (ref 20–200)
CO2 SERPL-SCNC: 26 MMOL/L (ref 22–29)
CREAT SERPL-MCNC: 1 MG/DL (ref 0.7–1.2)
EOSINOPHIL # BLD: 0.1 K/UL (ref 0.05–0.5)
EOSINOPHILS RELATIVE PERCENT: 2 % (ref 0–6)
ERYTHROCYTE [DISTWIDTH] IN BLOOD BY AUTOMATED COUNT: 13.6 % (ref 11.5–15)
GFR, ESTIMATED: 76 ML/MIN/1.73M2
GLUCOSE SERPL-MCNC: 101 MG/DL (ref 74–99)
HCT VFR BLD AUTO: 30.5 % (ref 37–54)
HGB BLD-MCNC: 10 G/DL (ref 12.5–16.5)
IMM GRANULOCYTES # BLD AUTO: <0.03 K/UL (ref 0–0.58)
IMM GRANULOCYTES NFR BLD: 0 % (ref 0–5)
LYMPHOCYTES NFR BLD: 1.35 K/UL (ref 1.5–4)
LYMPHOCYTES RELATIVE PERCENT: 24 % (ref 20–42)
MCH RBC QN AUTO: 28.5 PG (ref 26–35)
MCHC RBC AUTO-ENTMCNC: 32.8 G/DL (ref 32–34.5)
MCV RBC AUTO: 86.9 FL (ref 80–99.9)
MONOCYTES NFR BLD: 0.8 K/UL (ref 0.1–0.95)
MONOCYTES NFR BLD: 14 % (ref 2–12)
NEUTROPHILS NFR BLD: 59 % (ref 43–80)
NEUTS SEG NFR BLD: 3.35 K/UL (ref 1.8–7.3)
PLATELET # BLD AUTO: 174 K/UL (ref 130–450)
PMV BLD AUTO: 9.9 FL (ref 7–12)
POTASSIUM SERPL-SCNC: 4 MMOL/L (ref 3.5–5)
PROT SERPL-MCNC: 6.4 G/DL (ref 6.4–8.3)
RBC # BLD AUTO: 3.51 M/UL (ref 3.8–5.8)
SODIUM SERPL-SCNC: 138 MMOL/L (ref 132–146)
WBC OTHER # BLD: 5.6 K/UL (ref 4.5–11.5)

## 2024-08-26 PROCEDURE — 2580000003 HC RX 258: Performed by: INTERNAL MEDICINE

## 2024-08-26 PROCEDURE — 6370000000 HC RX 637 (ALT 250 FOR IP): Performed by: INTERNAL MEDICINE

## 2024-08-26 PROCEDURE — 2060000000 HC ICU INTERMEDIATE R&B

## 2024-08-26 PROCEDURE — 2580000003 HC RX 258: Performed by: SPECIALIST

## 2024-08-26 PROCEDURE — 85025 COMPLETE CBC W/AUTO DIFF WBC: CPT

## 2024-08-26 PROCEDURE — 82550 ASSAY OF CK (CPK): CPT

## 2024-08-26 PROCEDURE — 96375 TX/PRO/DX INJ NEW DRUG ADDON: CPT

## 2024-08-26 PROCEDURE — 99239 HOSP IP/OBS DSCHRG MGMT >30: CPT | Performed by: INTERNAL MEDICINE

## 2024-08-26 PROCEDURE — 80053 COMPREHEN METABOLIC PANEL: CPT

## 2024-08-26 PROCEDURE — 6360000002 HC RX W HCPCS: Performed by: SPECIALIST

## 2024-08-26 RX ORDER — ASPIRIN 81 MG/1
81 TABLET ORAL 2 TIMES DAILY
Qty: 30 TABLET | Refills: 3 | Status: SHIPPED | OUTPATIENT
Start: 2024-08-26

## 2024-08-26 RX ADMIN — SUCRALFATE 1 G: 1 TABLET ORAL at 05:28

## 2024-08-26 RX ADMIN — SERTRALINE HYDROCHLORIDE 50 MG: 50 TABLET ORAL at 08:07

## 2024-08-26 RX ADMIN — SODIUM CHLORIDE 750 MG: 9 INJECTION INTRAMUSCULAR; INTRAVENOUS; SUBCUTANEOUS at 05:28

## 2024-08-26 RX ADMIN — Medication 2500 UNITS: at 08:07

## 2024-08-26 RX ADMIN — DOCUSATE SODIUM 100 MG: 100 CAPSULE, LIQUID FILLED ORAL at 08:07

## 2024-08-26 RX ADMIN — ASPIRIN 81 MG: 81 TABLET, COATED ORAL at 08:07

## 2024-08-26 RX ADMIN — SUCRALFATE 1 G: 1 TABLET ORAL at 11:25

## 2024-08-26 RX ADMIN — SODIUM CHLORIDE, PRESERVATIVE FREE 10 ML: 5 INJECTION INTRAVENOUS at 09:49

## 2024-08-26 RX ADMIN — LOSARTAN POTASSIUM 100 MG: 50 TABLET, FILM COATED ORAL at 08:07

## 2024-08-26 NOTE — PROGRESS NOTES
[facility]  [facility]  Progress Note    SUBJECTIVE:  Chief Complaint   Patient presents with    Post-op Problem     Pt had an infection taken out of his right hip two weeks ago. Receiving IV ATB at home. Follows with Dr. Jones (surgeon) and Dr. Santo (ID)    Fever     102.7 fever at home took 1000mg Tylenol at 9pm    Diarrhea     Patient is tolerating medications. No reported adverse drug reactions.  Sitting up on the couch in the room  Has remained afebrile   Feeling well    Review of systems:  As stated above in the chief complaint, otherwise negative.    Medications:  Scheduled Meds:   DAPTOmycin (CUBICIN) 750 mg in sodium chloride (PF) 0.9 % 15 mL IV syringe  8 mg/kg IntraVENous Q24H    docusate sodium  100 mg Oral Daily    amLODIPine  5 mg Oral Nightly    losartan  100 mg Oral Daily    [Held by provider] rosuvastatin  40 mg Oral Nightly    sertraline  50 mg Oral Daily    sucralfate  1 g Oral 4x Daily AC & HS    Vitamin D  2,500 Units Oral Daily    sodium chloride flush  5-40 mL IntraVENous 2 times per day    aspirin  81 mg Oral Daily     Continuous Infusions:   sodium chloride       PRN Meds:pantoprazole, sodium chloride flush, sodium chloride, ondansetron **OR** ondansetron, polyethylene glycol, acetaminophen **OR** acetaminophen, traMADol, clonazePAM    OBJECTIVE:  BP (!) 140/78   Pulse 50   Temp 98.5 °F (36.9 °C) (Oral)   Resp 18   Ht 1.829 m (6')   Wt 94.3 kg (208 lb)   SpO2 98%   BMI 28.21 kg/m²   Temp  Av.5 °F (36.9 °C)  Min: 98.4 °F (36.9 °C)  Max: 98.9 °F (37.2 °C)  Constitutional: The patient is awake, alert, and oriented.sitting up on the couch in the room   Skin: Warm and dry. No rashes were noted.   HEENT: Round and reactive pupils.  Moist mucous membranes.  No ulcerations or thrush.  Neck: Supple to movements.   Chest: No use of accessory muscles to breathe. Symmetrical expansion. Clear  Cardiovascular: S1 and S2 are rhythmic and regular. No  this antibiotic for 4 more weeks to complete antibiotics.  Follow-up in the office    Cristofer Sierra MD  8/26/2024  1:48 PM

## 2024-08-26 NOTE — PLAN OF CARE
Problem: ABCDS Injury Assessment  Goal: Absence of physical injury  Outcome: Progressing     Problem: Discharge Planning  Goal: Discharge to home or other facility with appropriate resources  8/25/2024 2102 by rOquidea Toscano, RN  Outcome: Progressing  Flowsheets (Taken 8/25/2024 1945)  Discharge to home or other facility with appropriate resources: Identify barriers to discharge with patient and caregiver  8/25/2024 0910 by Carole Urbina, RN  Outcome: Progressing     Problem: Pain  Goal: Verbalizes/displays adequate comfort level or baseline comfort level  Outcome: Progressing     Problem: Safety - Adult  Goal: Free from fall injury  Outcome: Progressing

## 2024-08-26 NOTE — PROGRESS NOTES
[facility] Quality Flow/Interdisciplinary Rounds Progress Note        Quality Flow Rounds held on August 26, 2024    Disciplines Attending:  Bedside Nurse, , , and Nursing Unit Leadership    Edilberto Hillman was admitted on 8/23/2024  4:56 AM    Anticipated Discharge Date:       Disposition:    Edward Score:  Edward Scale Score: 23    Readmission Risk              Risk of Unplanned Readmission:  0           Discussed patient goal for the day, patient clinical progression, and barriers to discharge.  The following Goal(s) of the Day/Commitment(s) have been identified:   discharge planning, BC pending, KUB negative, Ortho signed off, start daptomycin 8/26      Royal White, RN  August 26, 2024

## 2024-08-26 NOTE — CARE COORDINATION
S/p OR on 8/8 for septic right ZENY: Right ZENY w/irrigation and debridement with head and liner exchange, and placement of antibiotic beads. Pt was discharged w/[facility], PICC and IV rocephin, readmitted w/fever. Script received today for Dapto upon discharge w/script faxed to [facility], await benefit determination. [facility] notified of admission.   PIERRE TalleyN, RN  [facility] Case Management  [phone]

## 2024-08-26 NOTE — PLAN OF CARE
Problem: ABCDS Injury Assessment  Goal: Absence of physical injury  8/26/2024 1546 by Aris Montes RN  Outcome: Completed  8/26/2024 1145 by Aris Montes RN  Outcome: Progressing     Problem: Discharge Planning  Goal: Discharge to home or other facility with appropriate resources  8/26/2024 1546 by Aris Montes RN  Outcome: Completed  8/26/2024 1145 by Aris Montes RN  Outcome: Progressing     Problem: Pain  Goal: Verbalizes/displays adequate comfort level or baseline comfort level  8/26/2024 1546 by Aris Montes RN  Outcome: Completed  8/26/2024 1145 by Aris Montes RN  Outcome: Progressing     Problem: Safety - Adult  Goal: Free from fall injury  8/26/2024 1546 by Aris Montes RN  Outcome: Completed  8/26/2024 1145 by Aris Montes RN  Outcome: Progressing

## 2024-08-26 NOTE — DISCHARGE SUMMARY
[facility] Hospitalist       Hospitalist Physician Discharge Summary       Corey Green DO  [address] [facility] [address]  [phone]    Schedule an appointment as soon as possible for a visit       Won Jones MD  [address] [facility] [address]  [phone]    Schedule an appointment as soon as possible for a visit       [employer]  [address] [place] [id number] [phone]          Activity level: as toy    Diet: ADULT DIET; Regular    Dispo:home    Condition at discharge: fair          Patient ID:  Edilberto Hillman  [id number]  73 y.o.  1950    Admit date: 8/23/2024    Discharge date and time:  8/26/2024  4:23 PM    Admission Diagnoses: Principal Problem:    Postoperative fever  Active Problems:    Fever    Depression    Hyperlipidemia  Resolved Problems:    * No resolved hospital problems. *      Discharge Diagnoses: Principal Problem:    Postoperative fever  Active Problems:    Fever    Depression    Hyperlipidemia  Resolved Problems:    * No resolved hospital problems. *    fever  Right hip prosthetic joint infection  Antibiotic associated diarrhea  Htn  Hyperlipidemia  depression      Consults:  IP CONSULT TO INFECTIOUS DISEASES  IP CONSULT TO ORTHOPEDIC SURGERY    Procedures: none    Hospital Course: Patient was admitted with Postoperative fever [R50.82]  Fever, unspecified fever cause [R50.9]  Diarrhea, unspecified type [R19.7]. Patient is a 73 year old male with past medical history of CAD s/p stents, cancer of the appendix s/p hemicolectomy, enlarged prostate, GERD, HLD, HTN, and bilateral hip replacements x 2 each side. He presented to ER 8/5/24 with complaints of right hip pain and fever and was found to have right hip septic arthritis secondary to Streptococcus s/p head a linear exchange and placement with antibiotic beads on 8/7/24. He was discharged home on 8/9 with a PICC line and Ceftriaxone. However, patient  [facility] - [place], [place] - [address] [facility] - P [phone] - F [phone]  [address] [facility] [address]      Phone: [phone]   aspirin 81 MG EC tablet       You can get these medications from any pharmacy    Bring a paper prescription for each of these medications  DAPTOmycin infusion           Total time for discharge is 37 min    Signed:  Electronically signed by Ze Arrington DO on 8/26/2024 at 4:23 PM

## 2024-08-26 NOTE — CONSULTS
Department of Orthopedic Surgery  Attending Consult Note        Reason for Consult:  fever  Requesting Physician:  ID service    CHIEF COMPLAINT:  fever    History Obtained From:  patient    HISTORY OF PRESENT ILLNESS:                The patient is a 73 y.o. male who presents with a fever s/p right ZENY I&D with head and liner exchange due to septic arthritis.  Patient was admitted d/t running a fever of 102 at home.  ID feels that this is medication induced.  Patient is not having hip pain.  Non-surgical left side is not bothering him at all, right side is a little sore but much  improved from his pre-op pain level.  No issues with incision.    Past Medical History:        Diagnosis Date    CAD (coronary artery disease)     with stents - Dr. Martinez yearly     Cancer (HCC)     appendix    Chronic back pain     Enlarged prostate     GERD (gastroesophageal reflux disease)     Hearing loss     hearing aids    Hyperlipidemia     Hypertension     Insomnia     PONV (postoperative nausea and vomiting)     Spondylosis     L4&5    Urinary incontinence     Ventricular ectopic beats 04/12/2013     Past Surgical History:        Procedure Laterality Date    APPENDECTOMY  2/2023    COLECTOMY Right 03/22/2023    LAPAROSCOPIC ROBOTIC XI ASSISTED RIGHT HEMICOLECTOMY performed by Indira Arce MD at [facility] OR    COLONOSCOPY      COLONOSCOPY N/A 03/15/2023    COLONOSCOPY WITH BIOPSY performed by Indira Arce MD at [facility] ENDOSCOPY    COLONOSCOPY N/A 3/15/2024    COLONOSCOPY BIOPSY performed by Indira Arce MD at [facility] ENDOSCOPY    CORONARY ANGIOPLASTY      with stent 2011     DIAGNOSTIC CARDIAC CATH LAB PROCEDURE      ENDOSCOPY, COLON, DIAGNOSTIC      INSERT PICC LINE  8/9/2024    JOINT REPLACEMENT Right 1990, 11/2011    hip x2    JOINT REPLACEMENT Left 1990, 2007    hip x2     LAPAROSCOPIC APPENDECTOMY N/A 02/09/2023    APPENDECTOMY LAPAROSCOPIC performed by Indira Arce MD at [facility] OR     PROSTATE SURGERY  06/09/2023    Holep-Laser    REVISION TOTAL HIP ARTHROPLASTY Right 8/7/2024    RIGHT HIP TOTAL ARTHROPLASTY IRRIGATION AND DEBRIDEMENT  WITH HEAD AND LINER EXCHANGE performed by Won Jones MD at [facility] OR    SUBTOTAL COLECTOMY  3/2023    TONSILLECTOMY      UPPER GASTROINTESTINAL ENDOSCOPY N/A 05/22/2020    EGD BIOPSY performed by Indira Arce MD at [facility] ENDOSCOPY     Current Medications:   Current Facility-Administered Medications: DAPTOmycin (CUBICIN) 750 mg in sodium chloride (PF) 0.9 % 15 mL IV syringe, 8 mg/kg, IntraVENous, Q24H  docusate sodium (COLACE) capsule 100 mg, 100 mg, Oral, Daily  amLODIPine (NORVASC) tablet 5 mg, 5 mg, Oral, Nightly  losartan (COZAAR) tablet 100 mg, 100 mg, Oral, Daily  pantoprazole (PROTONIX) tablet 40 mg, 40 mg, Oral, Daily PRN  [Held by provider] rosuvastatin (CRESTOR) tablet 40 mg, 40 mg, Oral, Nightly  sertraline (ZOLOFT) tablet 50 mg, 50 mg, Oral, Daily  sucralfate (CARAFATE) tablet 1 g, 1 g, Oral, 4x Daily AC & HS  Vitamin D (CHOLECALCIFEROL) tablet 2,500 Units, 2,500 Units, Oral, Daily  sodium chloride flush 0.9 % injection 5-40 mL, 5-40 mL, IntraVENous, 2 times per day  sodium chloride flush 0.9 % injection 5-40 mL, 5-40 mL, IntraVENous, PRN  0.9 % sodium chloride infusion, , IntraVENous, PRN  ondansetron (ZOFRAN-ODT) disintegrating tablet 4 mg, 4 mg, Oral, Q8H PRN **OR** ondansetron (ZOFRAN) injection 4 mg, 4 mg, IntraVENous, Q6H PRN  polyethylene glycol (GLYCOLAX) packet 17 g, 17 g, Oral, Daily PRN  acetaminophen (TYLENOL) tablet 650 mg, 650 mg, Oral, Q6H PRN **OR** acetaminophen (TYLENOL) suppository 650 mg, 650 mg, Rectal, Q6H PRN  traMADol (ULTRAM) tablet 50 mg, 50 mg, Oral, Q6H PRN  aspirin EC tablet 81 mg, 81 mg, Oral, Daily  clonazePAM (KLONOPIN) tablet 0.5 mg, 0.5 mg, Oral, Nightly PRN  Allergies:  Patient has no known allergies.    Social History:     Family History:       Problem Relation Age of Onset    Diabetes  Mother     High Blood Pressure Mother     Obesity Mother     Cancer Father     Colon Cancer Father      REVIEW OF SYSTEMS:    CONSTITUTIONAL:  positive for  fevers and chills, but none the past 24 hours    PHYSICAL EXAM:    VITALS:  BP (!) 150/75   Pulse 59   Temp 98.4 °F (36.9 °C) (Oral)   Resp 18   Ht 1.829 m (6')   Wt 94.3 kg (208 lb)   SpO2 96%   BMI 28.21 kg/m²   24HR INTAKE/OUTPUT:  No intake or output data in the 24 hours ending 08/26/24 0733  CONSTITUTIONAL:  awake, alert, cooperative, no apparent distress, and appears stated age  MUSCULOSKELETAL:  no pain with gentle rom daniele hips.  Right hip sore with more end range of motion.  NEUROLOGIC:  Motor Exam:  Motor exam is symmetrical 5 out of 5 all extremities bilaterally  Sensory:  Touch:  Right Lower Extremity:  normal  Left Lower Extremity:  normal  SKIN: right hip incision c/d/I  no cellulitis no issues    DATA:    CBC with Differential:    Lab Results   Component Value Date/Time    WBC 5.6 08/26/2024 02:00 AM    RBC 3.51 08/26/2024 02:00 AM    RBC 4.84 10/11/2022 09:16 AM    HGB 10.0 08/26/2024 02:00 AM    HCT 30.5 08/26/2024 02:00 AM     08/26/2024 02:00 AM    MCV 86.9 08/26/2024 02:00 AM    MCH 28.5 08/26/2024 02:00 AM    MCHC 32.8 08/26/2024 02:00 AM    RDW 13.6 08/26/2024 02:00 AM    LYMPHOPCT 24 08/26/2024 02:00 AM    LYMPHOPCT 29.9 10/11/2022 09:16 AM    MONOPCT 14 08/26/2024 02:00 AM    EOSPCT 2 08/26/2024 02:00 AM    BASOPCT 0 08/26/2024 02:00 AM    MONOSABS 0.80 08/26/2024 02:00 AM    LYMPHSABS 1.35 08/26/2024 02:00 AM    EOSABS 0.10 08/26/2024 02:00 AM    BASOSABS 0.02 08/26/2024 02:00 AM     BMP:    Lab Results   Component Value Date/Time     08/26/2024 02:00 AM    K 4.0 08/26/2024 02:00 AM    K 3.8 03/25/2023 02:48 AM     08/26/2024 02:00 AM    CO2 26 08/26/2024 02:00 AM    BUN 23 08/26/2024 02:00 AM    CREATININE 1.0 08/26/2024 02:00 AM    CALCIUM 8.5 08/26/2024 02:00 AM    GFRAA >60 12/16/2021 08:48 AM    LABGLOM 76

## 2024-08-27 ENCOUNTER — TELEPHONE (OUTPATIENT)
Dept: FAMILY MEDICINE CLINIC | Age: 74
End: 2024-08-27

## 2024-08-27 NOTE — TELEPHONE ENCOUNTER
Care Transitions Initial Follow Up Call    Outreach made within 2 business days of discharge: Yes    Patient: Edilberto Hillman Patient : 1950   MRN: [id number]  Reason for Admission: Post Operative Fever  Discharge Date: 24       Spoke with: patient    Discharge department/facility: [facility] Interactive Patient Contact:  Was patient able to fill all prescriptions: Yes  Was patient instructed to bring all medications to the follow-up visit: Yes  Is patient taking all medications as directed in the discharge summary? Yes  Does patient understand their discharge instructions: Yes  Does patient have questions or concerns that need addressed prior to 7-14 day follow up office visit: no    Additional needs identified to be addressed with provider  No needs identified             Scheduled appointment with PCP within 7-14 days--appt scheduled 24.    Follow Up  Future Appointments   Date Time Provider Department Center   9/3/2024  9:15 AM Critsofer Sierra MD [facility]   2024  9:30 AM Corey Green DO [facility]       Marla Pollard

## 2024-08-28 LAB
MICROORGANISM SPEC CULT: NORMAL
MICROORGANISM SPEC CULT: NORMAL
SERVICE CMNT-IMP: NORMAL
SERVICE CMNT-IMP: NORMAL
SPECIMEN DESCRIPTION: NORMAL
SPECIMEN DESCRIPTION: NORMAL

## 2024-08-29 LAB
MICROORGANISM SPEC CULT: NORMAL
SERVICE CMNT-IMP: NORMAL
SPECIMEN DESCRIPTION: NORMAL

## 2024-09-03 PROBLEM — T84.51XA INFECTION OF RIGHT PROSTHETIC HIP JOINT (HCC): Status: ACTIVE | Noted: 2024-08-05

## 2024-09-05 ENCOUNTER — OFFICE VISIT (OUTPATIENT)
Dept: FAMILY MEDICINE CLINIC | Age: 74
End: 2024-09-05

## 2024-09-05 VITALS
BODY MASS INDEX: 28.31 KG/M2 | HEART RATE: 68 BPM | HEIGHT: 72 IN | DIASTOLIC BLOOD PRESSURE: 64 MMHG | RESPIRATION RATE: 16 BRPM | TEMPERATURE: 97.2 F | OXYGEN SATURATION: 98 % | SYSTOLIC BLOOD PRESSURE: 112 MMHG | WEIGHT: 209 LBS

## 2024-09-05 DIAGNOSIS — R32 URINARY INCONTINENCE, UNSPECIFIED TYPE: ICD-10-CM

## 2024-09-05 DIAGNOSIS — E78.5 DYSLIPIDEMIA: ICD-10-CM

## 2024-09-05 DIAGNOSIS — Z09 HOSPITAL DISCHARGE FOLLOW-UP: Primary | ICD-10-CM

## 2024-09-05 DIAGNOSIS — R50.9 ELEVATED TEMPERATURE: ICD-10-CM

## 2024-09-05 DIAGNOSIS — Z96.649 INFECTION OF PROSTHETIC HIP JOINT, SEQUELA: ICD-10-CM

## 2024-09-05 DIAGNOSIS — I10 ESSENTIAL HYPERTENSION: ICD-10-CM

## 2024-09-05 DIAGNOSIS — T84.59XS INFECTION OF PROSTHETIC HIP JOINT, SEQUELA: ICD-10-CM

## 2024-09-05 LAB
BILIRUBIN, URINE: NEGATIVE
COLOR, UA: YELLOW
COMMENT: NORMAL
GLUCOSE URINE: NEGATIVE MG/DL
KETONES, URINE: NEGATIVE MG/DL
LEUKOCYTE ESTERASE, URINE: NEGATIVE
MICROORGANISM SPEC CULT: NORMAL
MICROORGANISM/AGENT SPEC: NORMAL
NITRITE, URINE: NEGATIVE
PH, URINE: 6 (ref 5–9)
PROTEIN UA: NEGATIVE MG/DL
SERVICE CMNT-IMP: NORMAL
SPECIFIC GRAVITY UA: 1.01 (ref 1–1.03)
SPECIMEN DESCRIPTION: NORMAL
TURBIDITY: CLEAR
URINE HGB: NEGATIVE
UROBILINOGEN, URINE: 0.2 EU/DL (ref 0–1)

## 2024-09-05 ASSESSMENT — ENCOUNTER SYMPTOMS: SHORTNESS OF BREATH: 0

## 2024-09-05 NOTE — PROGRESS NOTES
Post-Discharge Transitional Care Follow Up      Edilberto Hillman   YOB: 1950    Date of Office Visit:  9/5/2024  Date of Hospital Admission: 8/23/24  Date of Hospital Discharge: 8/26/24  Readmission Risk Score (high >=14%. Medium >=10%):Readmission Risk Score: 14.2      Care management risk score Rising risk (score 2-5) and Complex Care (Scores >=6): No Risk Score On File     Non face to face  following discharge, date last encounter closed (first attempt may have been earlier): 08/27/2024     Call initiated 2 business days of discharge: Yes     Hospital discharge follow-up  -     GA DISCHARGE MEDS RECONCILED W/ CURRENT OUTPATIENT MED LIST  Infection of prosthetic hip joint, sequela  -Patient reports pain is improved and minimal, ambulating without difficulty and is not needing pain medication  -Continue daptomycin and follow-up with infectious disease and Ortho as scheduled  Urinary incontinence, unspecified type  -Obtain urinalysis and culture, will contact patient by phone if there are regarding result  -     Urinalysis; Future  -     Culture, Urine; Future  Elevated temperature  -Exam unremarkable.  Denies any respiratory symptoms, potential skin infection, admits to increase in urinary incontinence. UA and UC  -     Urinalysis; Future  -     Culture, Urine; Future  Essential hypertension  -Blood pressure acceptable today, hydrochlorothiazide was discontinued per discharge summary possibly by mistake.  Patient is to continue to monitor home blood pressure and resume hydrochlorothiazide if readings greater than 130/80.  Dyslipidemia  -Continue to hold statin until course of daptomycin is complete        Return for routine f/u as previously scheduled, or sooner if needed.           Subjective:   HPI    Inpatient course: Discharge summary reviewed- see chart.    Discharge Diagnoses: Principal Problem:    Postoperative fever  Active Problems:    Fever    Depression    Hyperlipidemia  Resolved

## 2024-09-07 LAB
CULTURE: NO GROWTH
SPECIMEN DESCRIPTION: NORMAL

## 2024-10-28 DIAGNOSIS — F41.9 ANXIETY AND DEPRESSION: ICD-10-CM

## 2024-10-28 DIAGNOSIS — F32.A ANXIETY AND DEPRESSION: ICD-10-CM

## 2024-10-29 NOTE — TELEPHONE ENCOUNTER
Name of Medication(s) Requested:  Requested Prescriptions     Pending Prescriptions Disp Refills    sertraline (ZOLOFT) 50 MG tablet [Pharmacy Med Name: SERTRALINE HCL TABS 50MG] 90 tablet 1     Sig: TAKE 1 TABLET DAILY       Medication is on current medication list Yes    Dosage and directions were verified? Yes    Quantity verified: 90 day supply     Pharmacy Verified?  Yes    Last Appointment:  9/5/2024    Future appts:  Future Appointments   Date Time Provider Department Center   11/20/2024  9:30 AM Corey Green DO [facility] DEP        (If no appt send self scheduling link. .REFILLAPPT)  Scheduling request sent?     [] Yes  [x] No    Does patient need updated?  [] Yes  [x] No

## 2024-11-08 RX ORDER — ROSUVASTATIN CALCIUM 40 MG/1
40 TABLET, COATED ORAL NIGHTLY
Qty: 90 TABLET | Refills: 0 | Status: SHIPPED | OUTPATIENT
Start: 2024-11-08

## 2024-11-08 NOTE — TELEPHONE ENCOUNTER
Name of Medication(s) Requested:  Requested Prescriptions     Pending Prescriptions Disp Refills    rosuvastatin (CRESTOR) 40 MG tablet [Pharmacy Med Name: ROSUVASTATIN TABS 40MG]  0       Medication is on current medication list Yes    Dosage and directions were verified? Yes    Quantity verified: 90 day supply     Pharmacy Verified?  Yes    Last Appointment:  9/5/2024    Future appts:  Future Appointments   Date Time Provider Department Center   11/20/2024  9:30 AM Corey Green DO [facility] DEP        (If no appt send self scheduling link. .REFILLAPPT)  Scheduling request sent?     [] Yes  [x] No    Does patient need updated?  [] Yes  [x] No

## 2024-11-12 ENCOUNTER — TELEPHONE (OUTPATIENT)
Dept: FAMILY MEDICINE CLINIC | Age: 74
End: 2024-11-12

## 2024-11-12 DIAGNOSIS — T84.50XS INFECTION OF PROSTHETIC JOINT, SEQUELA: Primary | ICD-10-CM

## 2024-11-12 NOTE — TELEPHONE ENCOUNTER
Pt called to request CRP and Sedimentation Rate labs be added to his existing orders.    Last seen 9/5/2024  Next appt 11/20/2024

## 2024-11-13 DIAGNOSIS — E78.5 DYSLIPIDEMIA: ICD-10-CM

## 2024-11-13 DIAGNOSIS — E55.9 VITAMIN D DEFICIENCY: ICD-10-CM

## 2024-11-13 DIAGNOSIS — R73.03 PREDIABETES: ICD-10-CM

## 2024-11-13 DIAGNOSIS — K21.9 GASTROESOPHAGEAL REFLUX DISEASE WITHOUT ESOPHAGITIS: ICD-10-CM

## 2024-11-13 DIAGNOSIS — F41.9 ANXIETY AND DEPRESSION: ICD-10-CM

## 2024-11-13 DIAGNOSIS — F32.A ANXIETY AND DEPRESSION: ICD-10-CM

## 2024-11-13 DIAGNOSIS — T84.50XS INFECTION OF PROSTHETIC JOINT, SEQUELA: ICD-10-CM

## 2024-11-13 DIAGNOSIS — I10 ESSENTIAL HYPERTENSION: ICD-10-CM

## 2024-11-13 LAB
ALBUMIN: 4.1 G/DL (ref 3.5–5.2)
ALP BLD-CCNC: 81 U/L (ref 40–129)
ALT SERPL-CCNC: 10 U/L (ref 0–40)
ANION GAP SERPL CALCULATED.3IONS-SCNC: 8 MMOL/L (ref 7–16)
AST SERPL-CCNC: 18 U/L (ref 0–39)
BASOPHILS ABSOLUTE: 0.03 K/UL (ref 0–0.2)
BASOPHILS RELATIVE PERCENT: 0 % (ref 0–2)
BILIRUB SERPL-MCNC: 0.5 MG/DL (ref 0–1.2)
BUN BLDV-MCNC: 21 MG/DL (ref 6–23)
C-REACTIVE PROTEIN: 3 MG/L (ref 0–5)
CALCIUM SERPL-MCNC: 9.1 MG/DL (ref 8.6–10.2)
CHLORIDE BLD-SCNC: 107 MMOL/L (ref 98–107)
CHOLESTEROL, TOTAL: 131 MG/DL
CO2: 28 MMOL/L (ref 22–29)
CREAT SERPL-MCNC: 1.1 MG/DL (ref 0.7–1.2)
EOSINOPHILS ABSOLUTE: 0.11 K/UL (ref 0.05–0.5)
EOSINOPHILS RELATIVE PERCENT: 2 % (ref 0–6)
GFR, ESTIMATED: 69 ML/MIN/1.73M2
GLUCOSE BLD-MCNC: 97 MG/DL (ref 74–99)
HBA1C MFR BLD: 5.7 % (ref 4–5.6)
HCT VFR BLD CALC: 41.3 % (ref 37–54)
HDLC SERPL-MCNC: 42 MG/DL
HEMOGLOBIN: 12.7 G/DL (ref 12.5–16.5)
IMMATURE GRANULOCYTES %: 0 % (ref 0–5)
IMMATURE GRANULOCYTES ABSOLUTE: <0.03 K/UL (ref 0–0.58)
LDL CHOLESTEROL: 69 MG/DL
LYMPHOCYTES ABSOLUTE: 1.55 K/UL (ref 1.5–4)
LYMPHOCYTES RELATIVE PERCENT: 21 % (ref 20–42)
MCH RBC QN AUTO: 27 PG (ref 26–35)
MCHC RBC AUTO-ENTMCNC: 30.8 G/DL (ref 32–34.5)
MCV RBC AUTO: 87.7 FL (ref 80–99.9)
MONOCYTES ABSOLUTE: 0.53 K/UL (ref 0.1–0.95)
MONOCYTES RELATIVE PERCENT: 7 % (ref 2–12)
NEUTROPHILS ABSOLUTE: 5.27 K/UL (ref 1.8–7.3)
NEUTROPHILS RELATIVE PERCENT: 70 % (ref 43–80)
PDW BLD-RTO: 15.1 % (ref 11.5–15)
PLATELET # BLD: 170 K/UL (ref 130–450)
PMV BLD AUTO: 11.6 FL (ref 7–12)
POTASSIUM SERPL-SCNC: 4.9 MMOL/L (ref 3.5–5)
RBC # BLD: 4.71 M/UL (ref 3.8–5.8)
SED RATE, AUTOMATED: 26 MM/HR (ref 0–15)
SODIUM BLD-SCNC: 143 MMOL/L (ref 132–146)
T4 FREE: 1.1 NG/DL (ref 0.9–1.7)
TOTAL PROTEIN: 7 G/DL (ref 6.4–8.3)
TRIGL SERPL-MCNC: 98 MG/DL
TSH SERPL DL<=0.05 MIU/L-ACNC: 1.74 UIU/ML (ref 0.27–4.2)
VITAMIN D 25-HYDROXY: 57.9 NG/ML (ref 30–100)
VLDLC SERPL CALC-MCNC: 20 MG/DL
WBC # BLD: 7.5 K/UL (ref 4.5–11.5)

## 2024-11-17 SDOH — HEALTH STABILITY: PHYSICAL HEALTH: ON AVERAGE, HOW MANY MINUTES DO YOU ENGAGE IN EXERCISE AT THIS LEVEL?: 10 MIN

## 2024-11-17 SDOH — HEALTH STABILITY: PHYSICAL HEALTH: ON AVERAGE, HOW MANY DAYS PER WEEK DO YOU ENGAGE IN MODERATE TO STRENUOUS EXERCISE (LIKE A BRISK WALK)?: 1 DAY

## 2024-11-17 ASSESSMENT — LIFESTYLE VARIABLES
HOW OFTEN DO YOU HAVE SIX OR MORE DRINKS ON ONE OCCASION: 1
HOW OFTEN DO YOU HAVE A DRINK CONTAINING ALCOHOL: 1
HOW MANY STANDARD DRINKS CONTAINING ALCOHOL DO YOU HAVE ON A TYPICAL DAY: PATIENT DOES NOT DRINK
HOW MANY STANDARD DRINKS CONTAINING ALCOHOL DO YOU HAVE ON A TYPICAL DAY: 0
HOW OFTEN DO YOU HAVE A DRINK CONTAINING ALCOHOL: NEVER

## 2024-11-17 ASSESSMENT — PATIENT HEALTH QUESTIONNAIRE - PHQ9
1. LITTLE INTEREST OR PLEASURE IN DOING THINGS: NOT AT ALL
SUM OF ALL RESPONSES TO PHQ QUESTIONS 1-9: 0
SUM OF ALL RESPONSES TO PHQ9 QUESTIONS 1 & 2: 0
2. FEELING DOWN, DEPRESSED OR HOPELESS: NOT AT ALL
SUM OF ALL RESPONSES TO PHQ QUESTIONS 1-9: 0

## 2024-11-20 ENCOUNTER — OFFICE VISIT (OUTPATIENT)
Dept: FAMILY MEDICINE CLINIC | Age: 74
End: 2024-11-20

## 2024-11-20 VITALS
DIASTOLIC BLOOD PRESSURE: 75 MMHG | WEIGHT: 213.9 LBS | TEMPERATURE: 97.3 F | HEART RATE: 48 BPM | RESPIRATION RATE: 18 BRPM | HEIGHT: 72 IN | OXYGEN SATURATION: 99 % | BODY MASS INDEX: 28.97 KG/M2 | SYSTOLIC BLOOD PRESSURE: 118 MMHG

## 2024-11-20 DIAGNOSIS — C18.1 CANCER OF APPENDIX (HCC): ICD-10-CM

## 2024-11-20 DIAGNOSIS — T84.51XS INFECTION ASSOCIATED WITH INTERNAL RIGHT HIP PROSTHESIS, SEQUELA: ICD-10-CM

## 2024-11-20 DIAGNOSIS — F32.A ANXIETY AND DEPRESSION: Chronic | ICD-10-CM

## 2024-11-20 DIAGNOSIS — R73.03 PREDIABETES: ICD-10-CM

## 2024-11-20 DIAGNOSIS — E55.9 VITAMIN D DEFICIENCY: Chronic | ICD-10-CM

## 2024-11-20 DIAGNOSIS — F41.9 ANXIETY AND DEPRESSION: Chronic | ICD-10-CM

## 2024-11-20 DIAGNOSIS — E78.5 DYSLIPIDEMIA: ICD-10-CM

## 2024-11-20 DIAGNOSIS — T84.50XS INFECTION OF PROSTHETIC JOINT, SEQUELA: Primary | ICD-10-CM

## 2024-11-20 DIAGNOSIS — K21.9 GASTROESOPHAGEAL REFLUX DISEASE WITHOUT ESOPHAGITIS: ICD-10-CM

## 2024-11-20 DIAGNOSIS — I10 ESSENTIAL HYPERTENSION: Chronic | ICD-10-CM

## 2024-11-20 DIAGNOSIS — I25.110 ATHEROSCLEROSIS OF NATIVE CORONARY ARTERY OF NATIVE HEART WITH UNSTABLE ANGINA PECTORIS (HCC): ICD-10-CM

## 2024-11-20 PROBLEM — K59.00 CONSTIPATION: Status: RESOLVED | Noted: 2024-02-05 | Resolved: 2024-11-20

## 2024-11-20 PROBLEM — D62 ANEMIA ASSOCIATED WITH ACUTE BLOOD LOSS: Status: RESOLVED | Noted: 2024-08-09 | Resolved: 2024-11-20

## 2024-11-20 PROBLEM — R50.9 FEVER: Status: RESOLVED | Noted: 2024-08-23 | Resolved: 2024-11-20

## 2024-11-20 PROBLEM — N18.32 CHRONIC KIDNEY DISEASE, STAGE 3B (HCC): Status: ACTIVE | Noted: 2024-11-20

## 2024-11-20 PROBLEM — N18.32 CHRONIC KIDNEY DISEASE, STAGE 3B (HCC): Status: RESOLVED | Noted: 2024-11-20 | Resolved: 2024-11-20

## 2024-11-20 PROBLEM — R50.82 POSTOPERATIVE FEVER: Status: RESOLVED | Noted: 2024-08-23 | Resolved: 2024-11-20

## 2024-11-20 RX ORDER — AMLODIPINE BESYLATE 5 MG/1
5 TABLET ORAL NIGHTLY
Qty: 90 TABLET | Refills: 1 | Status: SHIPPED | OUTPATIENT
Start: 2024-11-20

## 2024-11-20 ASSESSMENT — ENCOUNTER SYMPTOMS
COUGH: 0
BACK PAIN: 1
WHEEZING: 0
SHORTNESS OF BREATH: 0

## 2024-11-20 NOTE — PROGRESS NOTES
Edilberto Hillman   Patient is a 74 y.o. year old male who presents with:  Chief Complaint   Patient presents with    6 Month Follow-Up     Blood work completed    anxiety and depression    Health Maintenance     Needs AWV scheduled    Back Pain     Back pain; states he has had back pain before but not in this area    Rectal Bleeding     Saw Dr Flores and had a scope; wants him to get colonoscopy in Feb 2025; comes and goes; moderate amount with clots at times     Patient also follows with: Cardiology, urology, chiropractor, ortho (hips), GS, oncology    HPI    Back/tailbone pain  Saw PM in the past and had LINDA  Onset around a couple months ago after no known injury but has been helping lift mother in law  Seeing chiropractor  Will get a new lift  Plans to begin medrol dosepack which was previously presribed     Hip infection  Completed course of abx, f/u w ID PRN.    Rectal bleeding  Saw Dr. Puentes and did anoscopy w/o bleeding source per pt, will do colonoscopy 2/2025.     HTN  Current treatment: amlodipine 5mg daily, olmesartan 40mg daily  Recent changes in treatment: HCTZ stopped during admission  The patient is known to have history of CAD.  BP Readings from Last 3 Encounters:   11/20/24 118/75   10/01/24 118/62   09/05/24 112/64     Dyslipidemia  Current treatment: rosuvastatin 40 mg daily  Recent changes in treatment: none  Indications for statin therapy include: clinical ASCVD (ACS, hx of MI, stable or unstable angina, hx of coronary or other arterial revascularization, hx of stroke or TIA, PAD).   Past treatment includes atorvastatin  Lab Results   Component Value Date    CHOL 131 11/13/2024    HDL 42 11/13/2024    TRIG 98 11/13/2024     GERD  Current treatment: protonix 40mg daily PRN  Recent changes in medication: none.     Anxiety and depression  Current treatment: sertraline 50 mg daily, klonopin nightly PRN  Recent changes in medication: increased sertraline dose two weeks ago  Takes klonopin maybe a

## 2024-12-06 RX ORDER — OLMESARTAN MEDOXOMIL 40 MG/1
TABLET ORAL
Qty: 90 TABLET | Refills: 1 | Status: SHIPPED | OUTPATIENT
Start: 2024-12-06

## 2024-12-06 NOTE — TELEPHONE ENCOUNTER
Name of Medication(s) Requested:  Requested Prescriptions     Pending Prescriptions Disp Refills    olmesartan (BENICAR) 40 MG tablet [Pharmacy Med Name: OLMESARTAN MEDOXOMIL TABS 40MG] 90 tablet 0     Sig: TAKE 1 TABLET DAILY       Medication is on current medication list Yes    Dosage and directions were verified? Yes    Quantity verified: 90 day supply     Pharmacy Verified?  Yes    Last Appointment:  11/20/2024    Future appts:  Future Appointments   Date Time Provider Department Center   3/21/2025 10:00 AM Corey Green DO [facility] DEP   3/21/2025 10:15 AM Corey Geren DO [facility] DEP        (If no appt send self scheduling link. .REFILLAPPT)  Scheduling request sent?     [] Yes  [x] No    Does patient need updated?  [] Yes  [x] No

## 2025-01-20 RX ORDER — ROSUVASTATIN CALCIUM 40 MG/1
40 TABLET, COATED ORAL NIGHTLY
Qty: 90 TABLET | Refills: 0 | Status: SHIPPED | OUTPATIENT
Start: 2025-01-20

## 2025-01-20 NOTE — TELEPHONE ENCOUNTER
Name of Medication(s) Requested:  Requested Prescriptions     Pending Prescriptions Disp Refills    rosuvastatin (CRESTOR) 40 MG tablet [Pharmacy Med Name: ROSUVASTATIN TABS 40MG] 90 tablet 0     Sig: TAKE 1 TABLET AT BEDTIME       Medication is on current medication list Yes    Dosage and directions were verified? Yes    Quantity verified: 90 day supply     Pharmacy Verified?  Yes    Last Appointment:  11/20/2024    Future appts:  Future Appointments   Date Time Provider Department Center   3/21/2025 10:00 AM Corey Green DO [facility] DEP   3/21/2025 10:15 AM Corey Green DO [facility] DEP        (If no appt send self scheduling link. .REFILLAPPT)  Scheduling request sent?     [] Yes  [x] No    Does patient need updated?  [] Yes  [x] No

## 2025-02-11 ENCOUNTER — HOSPITAL ENCOUNTER (OUTPATIENT)
Age: 75
Discharge: HOME | End: 2025-02-13

## 2025-02-11 PROCEDURE — 88305 TISSUE EXAM BY PATHOLOGIST: CPT

## 2025-02-14 LAB — SURGICAL PATHOLOGY REPORT: NORMAL

## 2025-02-25 DIAGNOSIS — F41.9 ANXIETY AND DEPRESSION: ICD-10-CM

## 2025-02-25 DIAGNOSIS — F32.A ANXIETY AND DEPRESSION: ICD-10-CM

## 2025-02-25 RX ORDER — CLONAZEPAM 0.5 MG/1
0.5 TABLET ORAL NIGHTLY PRN
Qty: 90 TABLET | Refills: 0 | OUTPATIENT
Start: 2025-02-25 | End: 2025-08-24

## 2025-02-25 NOTE — TELEPHONE ENCOUNTER
Patient called for a refill, requesting to have RX sent to his mail order pharmacy.    Name of Medication(s) Requested:  Requested Prescriptions     Pending Prescriptions Disp Refills    clonazePAM (KLONOPIN) 0.5 MG tablet 30 tablet 0     Sig: Take 1 tablet by mouth nightly as needed for Anxiety for up to 180 days.       Medication is on current medication list  NO (IN MEDICATION HISTORY)    Dosage and directions were verified? Yes    Quantity verified: 90 day supply     Pharmacy Verified?  Yes    Last Appointment:  11/20/2024    Future appts:  Future Appointments   Date Time Provider Department Center   3/21/2025 10:00 AM Corey Green DO [place] [facility] DEP   3/21/2025 10:15 AM Corey Green DO [facility] DEP        (If no appt send self scheduling link. .REFILLAPPT)  Scheduling request sent?     [] Yes  [x] No    Does patient need updated?  [] Yes  [x] No

## 2025-03-08 ENCOUNTER — PATIENT MESSAGE (OUTPATIENT)
Dept: FAMILY MEDICINE CLINIC | Age: 75
End: 2025-03-08

## 2025-03-08 DIAGNOSIS — F32.A ANXIETY AND DEPRESSION: ICD-10-CM

## 2025-03-08 DIAGNOSIS — F41.9 ANXIETY AND DEPRESSION: ICD-10-CM

## 2025-03-10 ENCOUNTER — TELEPHONE (OUTPATIENT)
Dept: SURGERY | Age: 75
End: 2025-03-10

## 2025-03-10 DIAGNOSIS — F32.A ANXIETY AND DEPRESSION: ICD-10-CM

## 2025-03-10 DIAGNOSIS — F41.9 ANXIETY AND DEPRESSION: ICD-10-CM

## 2025-03-10 NOTE — TELEPHONE ENCOUNTER
Patient called for a refill of Clonazepam and is preparing to have SX on 3/18/25.      Name of Medication(s) Requested:  Requested Prescriptions     Pending Prescriptions Disp Refills    clonazePAM (KLONOPIN) 0.5 MG tablet 90 tablet 0     Sig: Take 1 tablet by mouth nightly as needed for Anxiety for up to 180 days.       Medication is on current medication list  No; removed from list, 8/5/24; pt stated takes as needed for sleep; having SX 3/18/25    Dosage and directions were verified? Yes    Quantity verified: 90 day supply     Pharmacy Verified?  Yes    Last Appointment:  11/20/2024    Future appts:  Future Appointments   Date Time Provider Department Center   4/24/2025  1:30 PM Corey Green DO [facility] DEP   4/24/2025  1:45 PM Corey Green DO [facility] DEP        (If no appt send self scheduling link. .REFILLAPPT)  Scheduling request sent?     [] Yes  [x] No    Does patient need updated?  [] Yes  [x] No

## 2025-03-10 NOTE — TELEPHONE ENCOUNTER
1 YR RECALL COLONOSCOPY MARCH 2025 - PT SCHEDULED FOR COLECTOMY AT [facility] ON 3-

## 2025-03-10 NOTE — TELEPHONE ENCOUNTER
Patient called to follow up on a 30 day supply of Clonazepam, stating that he did not receive his mail order RX and would like a short term supply sent to his local pharmacy.  Patient informed me that he's having SX 3/18/25 and this helps him sleep.    Name of Medication(s) Requested:  Requested Prescriptions     Pending Prescriptions Disp Refills    clonazePAM (KLONOPIN) 0.5 MG tablet 30 tablet 0     Sig: Take 1 tablet by mouth nightly as needed for Anxiety for up to 180 days.       Medication is on current medication list  No; was removed 8/5/24; pt stated he's having SX 3/18 and takes this to help him sleep     Dosage and directions were verified? Yes    Quantity verified: 30 day supply     Pharmacy Verified?  Yes    Last Appointment:  11/20/2024    Future appts:  Future Appointments   Date Time Provider Department Center   4/24/2025  1:30 PM Corey Green DO [facility] DEP   4/24/2025  1:45 PM Corey Green DO [facility] DEP        (If no appt send self scheduling link. .REFILLAPPT)  Scheduling request sent?     [] Yes  [x] No    Does patient need updated?  [] Yes  [x] No

## 2025-03-11 RX ORDER — CLONAZEPAM 0.5 MG/1
0.5 TABLET ORAL NIGHTLY PRN
Qty: 30 TABLET | Refills: 0 | Status: SHIPPED | OUTPATIENT
Start: 2025-03-11 | End: 2025-06-09

## 2025-03-11 RX ORDER — CLONAZEPAM 0.5 MG/1
0.5 TABLET ORAL NIGHTLY PRN
Qty: 14 TABLET | Refills: 0 | Status: SHIPPED | OUTPATIENT
Start: 2025-03-11 | End: 2025-03-25

## 2025-04-17 DIAGNOSIS — I10 ESSENTIAL HYPERTENSION: Chronic | ICD-10-CM

## 2025-04-17 DIAGNOSIS — F41.9 ANXIETY AND DEPRESSION: Chronic | ICD-10-CM

## 2025-04-17 DIAGNOSIS — C18.1 CANCER OF APPENDIX (HCC): ICD-10-CM

## 2025-04-17 DIAGNOSIS — R73.03 PREDIABETES: ICD-10-CM

## 2025-04-17 DIAGNOSIS — K21.9 GASTROESOPHAGEAL REFLUX DISEASE WITHOUT ESOPHAGITIS: ICD-10-CM

## 2025-04-17 DIAGNOSIS — E78.5 DYSLIPIDEMIA: ICD-10-CM

## 2025-04-17 DIAGNOSIS — T84.50XS INFECTION OF PROSTHETIC JOINT, SEQUELA: ICD-10-CM

## 2025-04-17 DIAGNOSIS — E55.9 VITAMIN D DEFICIENCY: Chronic | ICD-10-CM

## 2025-04-17 DIAGNOSIS — T84.51XS INFECTION ASSOCIATED WITH INTERNAL RIGHT HIP PROSTHESIS, SEQUELA: ICD-10-CM

## 2025-04-17 DIAGNOSIS — F32.A ANXIETY AND DEPRESSION: Chronic | ICD-10-CM

## 2025-04-17 LAB
ALBUMIN: 3.9 G/DL (ref 3.5–5.2)
ALP BLD-CCNC: 68 U/L (ref 40–129)
ALT SERPL-CCNC: 12 U/L (ref 0–50)
ANION GAP SERPL CALCULATED.3IONS-SCNC: 10 MMOL/L (ref 7–16)
AST SERPL-CCNC: 22 U/L (ref 0–50)
BASOPHILS ABSOLUTE: 0.03 K/UL (ref 0–0.2)
BASOPHILS RELATIVE PERCENT: 0 % (ref 0–2)
BILIRUB SERPL-MCNC: 0.5 MG/DL (ref 0–1.2)
BUN BLDV-MCNC: 20 MG/DL (ref 8–23)
C-REACTIVE PROTEIN: <3 MG/L (ref 0–5)
CALCIUM SERPL-MCNC: 9.4 MG/DL (ref 8.8–10.2)
CHLORIDE BLD-SCNC: 106 MMOL/L (ref 98–107)
CHOLESTEROL, TOTAL: 120 MG/DL
CO2: 27 MMOL/L (ref 22–29)
CREAT SERPL-MCNC: 1.1 MG/DL (ref 0.7–1.2)
EOSINOPHILS ABSOLUTE: 0.21 K/UL (ref 0.05–0.5)
EOSINOPHILS RELATIVE PERCENT: 3 % (ref 0–6)
GFR, ESTIMATED: 73 ML/MIN/1.73M2
GLUCOSE BLD-MCNC: 95 MG/DL (ref 74–99)
HBA1C MFR BLD: 5.3 % (ref 4–5.6)
HCT VFR BLD CALC: 42.6 % (ref 37–54)
HDLC SERPL-MCNC: 45 MG/DL
HEMOGLOBIN: 13.3 G/DL (ref 12.5–16.5)
IMMATURE GRANULOCYTES %: 0 % (ref 0–5)
IMMATURE GRANULOCYTES ABSOLUTE: <0.03 K/UL (ref 0–0.58)
LDL CHOLESTEROL: 52 MG/DL
LYMPHOCYTES ABSOLUTE: 1.92 K/UL (ref 1.5–4)
LYMPHOCYTES RELATIVE PERCENT: 25 % (ref 20–42)
MCH RBC QN AUTO: 28.3 PG (ref 26–35)
MCHC RBC AUTO-ENTMCNC: 31.2 G/DL (ref 32–34.5)
MCV RBC AUTO: 90.6 FL (ref 80–99.9)
MONOCYTES ABSOLUTE: 0.65 K/UL (ref 0.1–0.95)
MONOCYTES RELATIVE PERCENT: 9 % (ref 2–12)
NEUTROPHILS ABSOLUTE: 4.77 K/UL (ref 1.8–7.3)
NEUTROPHILS RELATIVE PERCENT: 63 % (ref 43–80)
PDW BLD-RTO: 14.7 % (ref 11.5–15)
PLATELET # BLD: 158 K/UL (ref 130–450)
PMV BLD AUTO: 11.7 FL (ref 7–12)
POTASSIUM SERPL-SCNC: 4.2 MMOL/L (ref 3.5–5.1)
RBC # BLD: 4.7 M/UL (ref 3.8–5.8)
SED RATE, AUTOMATED: 13 MM/HR (ref 0–15)
SODIUM BLD-SCNC: 143 MMOL/L (ref 136–145)
T4 FREE: 1.1 NG/DL (ref 0.9–1.7)
TOTAL PROTEIN: 6.6 G/DL (ref 6.4–8.3)
TRIGL SERPL-MCNC: 115 MG/DL
TSH SERPL DL<=0.05 MIU/L-ACNC: 1.87 UIU/ML (ref 0.27–4.2)
VLDLC SERPL CALC-MCNC: 23 MG/DL
WBC # BLD: 7.6 K/UL (ref 4.5–11.5)

## 2025-04-21 DIAGNOSIS — I10 ESSENTIAL HYPERTENSION: Chronic | ICD-10-CM

## 2025-04-21 RX ORDER — ROSUVASTATIN CALCIUM 40 MG/1
40 TABLET, COATED ORAL NIGHTLY
Qty: 90 TABLET | Refills: 0 | Status: SHIPPED | OUTPATIENT
Start: 2025-04-21

## 2025-04-21 RX ORDER — AMLODIPINE BESYLATE 5 MG/1
5 TABLET ORAL NIGHTLY
Qty: 90 TABLET | Refills: 0 | Status: SHIPPED | OUTPATIENT
Start: 2025-04-21

## 2025-04-21 SDOH — HEALTH STABILITY: PHYSICAL HEALTH: ON AVERAGE, HOW MANY MINUTES DO YOU ENGAGE IN EXERCISE AT THIS LEVEL?: 10 MIN

## 2025-04-21 SDOH — HEALTH STABILITY: PHYSICAL HEALTH: ON AVERAGE, HOW MANY DAYS PER WEEK DO YOU ENGAGE IN MODERATE TO STRENUOUS EXERCISE (LIKE A BRISK WALK)?: 1 DAY

## 2025-04-21 ASSESSMENT — PATIENT HEALTH QUESTIONNAIRE - PHQ9
SUM OF ALL RESPONSES TO PHQ QUESTIONS 1-9: 1
1. LITTLE INTEREST OR PLEASURE IN DOING THINGS: NOT AT ALL
7. TROUBLE CONCENTRATING ON THINGS, SUCH AS READING THE NEWSPAPER OR WATCHING TELEVISION: NOT AT ALL
10. IF YOU CHECKED OFF ANY PROBLEMS, HOW DIFFICULT HAVE THESE PROBLEMS MADE IT FOR YOU TO DO YOUR WORK, TAKE CARE OF THINGS AT HOME, OR GET ALONG WITH OTHER PEOPLE: NOT DIFFICULT AT ALL
8. MOVING OR SPEAKING SO SLOWLY THAT OTHER PEOPLE COULD HAVE NOTICED. OR THE OPPOSITE, BEING SO FIGETY OR RESTLESS THAT YOU HAVE BEEN MOVING AROUND A LOT MORE THAN USUAL: NOT AT ALL
5. POOR APPETITE OR OVEREATING: NOT AT ALL
SUM OF ALL RESPONSES TO PHQ QUESTIONS 1-9: 1
2. FEELING DOWN, DEPRESSED OR HOPELESS: NOT AT ALL
SUM OF ALL RESPONSES TO PHQ QUESTIONS 1-9: 1
8. MOVING OR SPEAKING SO SLOWLY THAT OTHER PEOPLE COULD HAVE NOTICED. OR THE OPPOSITE - BEING SO FIDGETY OR RESTLESS THAT YOU HAVE BEEN MOVING AROUND A LOT MORE THAN USUAL: NOT AT ALL
SUM OF ALL RESPONSES TO PHQ QUESTIONS 1-9: 1
1. LITTLE INTEREST OR PLEASURE IN DOING THINGS: NOT AT ALL
9. THOUGHTS THAT YOU WOULD BE BETTER OFF DEAD, OR OF HURTING YOURSELF: NOT AT ALL
4. FEELING TIRED OR HAVING LITTLE ENERGY: SEVERAL DAYS
9. THOUGHTS THAT YOU WOULD BE BETTER OFF DEAD, OR OF HURTING YOURSELF: NOT AT ALL
6. FEELING BAD ABOUT YOURSELF - OR THAT YOU ARE A FAILURE OR HAVE LET YOURSELF OR YOUR FAMILY DOWN: NOT AT ALL
3. TROUBLE FALLING OR STAYING ASLEEP: NOT AT ALL
3. TROUBLE FALLING OR STAYING ASLEEP: NOT AT ALL
SUM OF ALL RESPONSES TO PHQ QUESTIONS 1-9: 0
2. FEELING DOWN, DEPRESSED OR HOPELESS: NOT AT ALL
4. FEELING TIRED OR HAVING LITTLE ENERGY: SEVERAL DAYS
2. FEELING DOWN, DEPRESSED OR HOPELESS: NOT AT ALL
6. FEELING BAD ABOUT YOURSELF - OR THAT YOU ARE A FAILURE OR HAVE LET YOURSELF OR YOUR FAMILY DOWN: NOT AT ALL
SUM OF ALL RESPONSES TO PHQ QUESTIONS 1-9: 1
SUM OF ALL RESPONSES TO PHQ QUESTIONS 1-9: 0
10. IF YOU CHECKED OFF ANY PROBLEMS, HOW DIFFICULT HAVE THESE PROBLEMS MADE IT FOR YOU TO DO YOUR WORK, TAKE CARE OF THINGS AT HOME, OR GET ALONG WITH OTHER PEOPLE: NOT DIFFICULT AT ALL
1. LITTLE INTEREST OR PLEASURE IN DOING THINGS: NOT AT ALL
SUM OF ALL RESPONSES TO PHQ QUESTIONS 1-9: 0
7. TROUBLE CONCENTRATING ON THINGS, SUCH AS READING THE NEWSPAPER OR WATCHING TELEVISION: NOT AT ALL
SUM OF ALL RESPONSES TO PHQ QUESTIONS 1-9: 0
5. POOR APPETITE OR OVEREATING: NOT AT ALL

## 2025-04-21 ASSESSMENT — LIFESTYLE VARIABLES
HOW OFTEN DO YOU HAVE SIX OR MORE DRINKS ON ONE OCCASION: 1
HOW MANY STANDARD DRINKS CONTAINING ALCOHOL DO YOU HAVE ON A TYPICAL DAY: 0
HOW OFTEN DO YOU HAVE A DRINK CONTAINING ALCOHOL: NEVER
HOW MANY STANDARD DRINKS CONTAINING ALCOHOL DO YOU HAVE ON A TYPICAL DAY: PATIENT DOES NOT DRINK
HOW OFTEN DO YOU HAVE A DRINK CONTAINING ALCOHOL: 1

## 2025-04-21 NOTE — TELEPHONE ENCOUNTER
Name of Medication(s) Requested:  Requested Prescriptions     Pending Prescriptions Disp Refills    amLODIPine (NORVASC) 5 MG tablet [Pharmacy Med Name: AMLODIPINE BESYLATE TABS 5MG] 90 tablet 0     Sig: TAKE 1 TABLET AT BEDTIME    rosuvastatin (CRESTOR) 40 MG tablet [Pharmacy Med Name: ROSUVASTATIN TABS 40MG] 90 tablet 0     Sig: TAKE 1 TABLET AT BEDTIME       Medication is on current medication list Yes    Dosage and directions were verified? Yes    Quantity verified: 90 day supply     Pharmacy Verified?  Yes    Last Appointment:  11/20/2024    Future appts:  Future Appointments   Date Time Provider Department Center   4/24/2025  1:30 PM Corey Green DO [place] DEP   4/24/2025  1:45 PM Corey Green DO [facility] DEP        (If no appt send self scheduling link. .REFILLAPPT)  Scheduling request sent?     [] Yes  [x] No    Does patient need updated?  [] Yes  [x] No

## 2025-04-24 ENCOUNTER — OFFICE VISIT (OUTPATIENT)
Dept: FAMILY MEDICINE CLINIC | Age: 75
End: 2025-04-24
Payer: MEDICARE

## 2025-04-24 VITALS
SYSTOLIC BLOOD PRESSURE: 116 MMHG | HEART RATE: 51 BPM | OXYGEN SATURATION: 99 % | WEIGHT: 206 LBS | TEMPERATURE: 97.5 F | RESPIRATION RATE: 18 BRPM | BODY MASS INDEX: 27.9 KG/M2 | DIASTOLIC BLOOD PRESSURE: 65 MMHG | HEIGHT: 72 IN

## 2025-04-24 VITALS
SYSTOLIC BLOOD PRESSURE: 116 MMHG | BODY MASS INDEX: 27.9 KG/M2 | RESPIRATION RATE: 18 BRPM | TEMPERATURE: 97.5 F | DIASTOLIC BLOOD PRESSURE: 65 MMHG | HEART RATE: 51 BPM | HEIGHT: 72 IN | OXYGEN SATURATION: 99 % | WEIGHT: 206 LBS

## 2025-04-24 DIAGNOSIS — F41.9 ANXIETY AND DEPRESSION: Chronic | ICD-10-CM

## 2025-04-24 DIAGNOSIS — R73.03 PREDIABETES: ICD-10-CM

## 2025-04-24 DIAGNOSIS — C18.1 CANCER OF APPENDIX (HCC): ICD-10-CM

## 2025-04-24 DIAGNOSIS — H93.8X2 CRACKLING SOUND IN LEFT EAR: ICD-10-CM

## 2025-04-24 DIAGNOSIS — F32.A ANXIETY AND DEPRESSION: Chronic | ICD-10-CM

## 2025-04-24 DIAGNOSIS — I25.110 ATHEROSCLEROSIS OF NATIVE CORONARY ARTERY OF NATIVE HEART WITH UNSTABLE ANGINA PECTORIS (HCC): ICD-10-CM

## 2025-04-24 DIAGNOSIS — E55.9 VITAMIN D DEFICIENCY: Chronic | ICD-10-CM

## 2025-04-24 DIAGNOSIS — E78.5 DYSLIPIDEMIA: Chronic | ICD-10-CM

## 2025-04-24 DIAGNOSIS — M00.9 PYOGENIC ARTHRITIS OF RIGHT HIP, DUE TO UNSPECIFIED ORGANISM (HCC): ICD-10-CM

## 2025-04-24 DIAGNOSIS — Z00.00 MEDICARE ANNUAL WELLNESS VISIT, SUBSEQUENT: Primary | ICD-10-CM

## 2025-04-24 DIAGNOSIS — I10 PRIMARY HYPERTENSION: Primary | ICD-10-CM

## 2025-04-24 PROBLEM — T84.51XA INFECTION OF RIGHT PROSTHETIC HIP JOINT: Status: RESOLVED | Noted: 2024-08-05 | Resolved: 2025-04-24

## 2025-04-24 PROCEDURE — 3078F DIAST BP <80 MM HG: CPT | Performed by: FAMILY MEDICINE

## 2025-04-24 PROCEDURE — G0439 PPPS, SUBSEQ VISIT: HCPCS | Performed by: FAMILY MEDICINE

## 2025-04-24 PROCEDURE — 99214 OFFICE O/P EST MOD 30 MIN: CPT | Performed by: FAMILY MEDICINE

## 2025-04-24 PROCEDURE — 1123F ACP DISCUSS/DSCN MKR DOCD: CPT | Performed by: FAMILY MEDICINE

## 2025-04-24 PROCEDURE — 1159F MED LIST DOCD IN RCRD: CPT | Performed by: FAMILY MEDICINE

## 2025-04-24 PROCEDURE — 3074F SYST BP LT 130 MM HG: CPT | Performed by: FAMILY MEDICINE

## 2025-04-24 RX ORDER — FLUTICASONE PROPIONATE 50 MCG
1 SPRAY, SUSPENSION (ML) NASAL DAILY
Qty: 16 G | Refills: 0 | Status: SHIPPED | OUTPATIENT
Start: 2025-04-24

## 2025-04-24 NOTE — PROGRESS NOTES
this visit.    The patient (or guardian, if applicable) and other individuals in attendance with the patient were advised that Artificial Intelligence will be utilized during this visit to record, process the conversation to generate a clinical note, and support improvement of the AI technology. The patient (or guardian, if applicable) and other individuals in attendance at the appointment consented to the use of AI, including the recording.

## 2025-04-24 NOTE — PROGRESS NOTES
Medicare Annual Wellness Visit    Edilberto Hillman is here for Medicare AWV (No issues at this time)    Assessment & Plan  1. Annual wellness visit.  - Currently exercises 1 day a week for 10 minutes, but previously exercised more frequently before undergoing surgeries in the past 2 years.  - Vision screen discussed; recommended to schedule an appointment with an ophthalmologist for an eye examination.  - Safety measures in the bathroom discussed; patient has textured nonslip surfaces but advised to consider installing shower bars or grab bars for additional safety.  - Encouraged to engage in physical activity for at least 30 minutes, 4 days a week, as tolerated.  Medicare annual wellness visit, subsequent    Results       Return for Medicare Annual Wellness Visit in 1 year.     Subjective     History of Present Illness  The patient is a 74-year-old male who presents for an annual wellness visit.    He has been unable to maintain his usual exercise regimen due to a series of surgeries over the past 2 years. Typically, he exercises 1 day of the week for 10 minutes at a time, as tolerated.     He reports having textured, nonslip surfaces in his shower and bathtub, and he has not experienced any instances of slipping.    Patient's complete Health Risk Assessment and screening values have been reviewed and are found in Flowsheets. The following problems were reviewed today and where indicated follow up appointments were made and/or referrals ordered.    Positive Risk Factor Screenings with Interventions:              Inactivity:  On average, how many days per week do you engage in moderate to strenuous exercise (like a brisk walk)?: (Patient-Rptd) 1 day (!) Abnormal  On average, how many minutes do you engage in exercise at this level?: (Patient-Rptd) 10 min  Interventions:  Will resume as tolerated         Vision Screen:  Do you have difficulty driving, watching TV, or doing any of your daily activities because of your

## 2025-04-25 DIAGNOSIS — F32.A ANXIETY AND DEPRESSION: ICD-10-CM

## 2025-04-25 DIAGNOSIS — F41.9 ANXIETY AND DEPRESSION: ICD-10-CM

## 2025-04-25 NOTE — TELEPHONE ENCOUNTER
Name of Medication(s) Requested:  Requested Prescriptions     Pending Prescriptions Disp Refills    sertraline (ZOLOFT) 50 MG tablet [Pharmacy Med Name: SERTRALINE HCL TABS 50MG] 90 tablet 3     Sig: TAKE 1 TABLET DAILY       Medication is on current medication list Yes    Dosage and directions were verified? Yes    Quantity verified: 90 day supply     Pharmacy Verified?  Yes    Last Appointment:  4/24/2025    Future appts:  Future Appointments   Date Time Provider Department Center   8/26/2025  9:45 AM Corey Green DO [facility]   4/30/2026 10:00 AM Corey Green DO [facility]        (If no appt send self scheduling link. .REFILLAPPT)  Scheduling request sent?     [] Yes  [x] No    Does patient need updated?  [] Yes  [x] No

## 2025-06-04 RX ORDER — OLMESARTAN MEDOXOMIL 40 MG/1
40 TABLET ORAL DAILY
Qty: 90 TABLET | Refills: 1 | Status: SHIPPED | OUTPATIENT
Start: 2025-06-04

## 2025-06-04 NOTE — TELEPHONE ENCOUNTER
Name of Medication(s) Requested:  Requested Prescriptions     Pending Prescriptions Disp Refills    olmesartan (BENICAR) 40 MG tablet [Pharmacy Med Name: OLMESARTAN MEDOXOMIL TABS 40MG] 90 tablet 3     Sig: TAKE 1 TABLET DAILY       Medication is on current medication list Yes    Dosage and directions were verified? Yes    Quantity verified: 90 day supply     Pharmacy Verified?  Yes    Last Appointment:  4/24/2025    Future appts:  Future Appointments   Date Time Provider Department Center   8/26/2025  9:45 AM Corey Green DO [facility] DE[facility]        (If no appt send self scheduling link. .REFILLAPPT)  Scheduling request sent?     [] Yes  [x] No    Does patient need updated?  [] Yes  [x] No

## 2025-06-27 ENCOUNTER — HOSPITAL ENCOUNTER (EMERGENCY)
Age: 75
Discharge: HOME | End: 2025-06-27
Attending: EMERGENCY MEDICINE
Payer: MEDICARE

## 2025-06-27 ENCOUNTER — APPOINTMENT (OUTPATIENT)
Dept: CT IMAGING | Age: 75
End: 2025-06-27
Payer: MEDICARE

## 2025-06-27 VITALS
HEART RATE: 55 BPM | SYSTOLIC BLOOD PRESSURE: 102 MMHG | RESPIRATION RATE: 16 BRPM | DIASTOLIC BLOOD PRESSURE: 66 MMHG | TEMPERATURE: 99.5 F | OXYGEN SATURATION: 95 %

## 2025-06-27 DIAGNOSIS — K52.1 CHEMOTHERAPY INDUCED DIARRHEA: Primary | ICD-10-CM

## 2025-06-27 DIAGNOSIS — C18.7 MALIGNANT NEOPLASM OF SIGMOID COLON (HCC): ICD-10-CM

## 2025-06-27 DIAGNOSIS — T45.1X5A CHEMOTHERAPY INDUCED DIARRHEA: Primary | ICD-10-CM

## 2025-06-27 DIAGNOSIS — E86.0 DEHYDRATION: ICD-10-CM

## 2025-06-27 LAB
ALBUMIN SERPL-MCNC: 3.9 G/DL (ref 3.5–5.2)
ALP SERPL-CCNC: 55 U/L (ref 40–129)
ALT SERPL-CCNC: 11 U/L (ref 0–50)
ANION GAP SERPL CALCULATED.3IONS-SCNC: 12 MMOL/L (ref 7–16)
AST SERPL-CCNC: 21 U/L (ref 0–50)
BASOPHILS # BLD: 0.01 K/UL (ref 0–0.2)
BASOPHILS NFR BLD: 0 % (ref 0–2)
BILIRUB SERPL-MCNC: 1.1 MG/DL (ref 0–1.2)
BILIRUB UR QL STRIP: ABNORMAL
BUN SERPL-MCNC: 16 MG/DL (ref 8–23)
CALCIUM SERPL-MCNC: 8.9 MG/DL (ref 8.8–10.2)
CHLORIDE SERPL-SCNC: 104 MMOL/L (ref 98–107)
CLARITY UR: CLEAR
CO2 SERPL-SCNC: 24 MMOL/L (ref 22–29)
COLOR UR: YELLOW
CREAT SERPL-MCNC: 1.1 MG/DL (ref 0.7–1.2)
EOSINOPHIL # BLD: 0.04 K/UL (ref 0.05–0.5)
EOSINOPHILS RELATIVE PERCENT: 1 % (ref 0–6)
ERYTHROCYTE [DISTWIDTH] IN BLOOD BY AUTOMATED COUNT: 18.2 % (ref 11.5–15)
GFR, ESTIMATED: 70 ML/MIN/1.73M2
GLUCOSE SERPL-MCNC: 130 MG/DL (ref 74–99)
GLUCOSE UR STRIP-MCNC: NEGATIVE MG/DL
HCT VFR BLD AUTO: 35.9 % (ref 37–54)
HGB BLD-MCNC: 12.3 G/DL (ref 12.5–16.5)
HGB UR QL STRIP.AUTO: NEGATIVE
IMM GRANULOCYTES # BLD AUTO: <0.03 K/UL (ref 0–0.58)
IMM GRANULOCYTES NFR BLD: 0 % (ref 0–5)
KETONES UR STRIP-MCNC: NEGATIVE MG/DL
LACTATE BLDV-SCNC: 1.5 MMOL/L (ref 0.5–2.2)
LEUKOCYTE ESTERASE UR QL STRIP: NEGATIVE
LIPASE SERPL-CCNC: 35 U/L (ref 13–60)
LYMPHOCYTES NFR BLD: 0.88 K/UL (ref 1.5–4)
LYMPHOCYTES RELATIVE PERCENT: 18 % (ref 20–42)
MAGNESIUM SERPL-MCNC: 1.8 MG/DL (ref 1.6–2.4)
MCH RBC QN AUTO: 29.3 PG (ref 26–35)
MCHC RBC AUTO-ENTMCNC: 34.3 G/DL (ref 32–34.5)
MCV RBC AUTO: 85.5 FL (ref 80–99.9)
MONOCYTES NFR BLD: 0.93 K/UL (ref 0.1–0.95)
MONOCYTES NFR BLD: 19 % (ref 2–12)
NEUTROPHILS NFR BLD: 62 % (ref 43–80)
NEUTS SEG NFR BLD: 3.01 K/UL (ref 1.8–7.3)
NITRITE UR QL STRIP: NEGATIVE
PH UR STRIP: 5.5 [PH] (ref 5–8)
PHOSPHATE SERPL-MCNC: 2.9 MG/DL (ref 2.5–4.5)
PLATELET # BLD AUTO: 142 K/UL (ref 130–450)
PMV BLD AUTO: 9.7 FL (ref 7–12)
POTASSIUM SERPL-SCNC: 3.5 MMOL/L (ref 3.5–5.1)
PROT SERPL-MCNC: 6.2 G/DL (ref 6.4–8.3)
PROT UR STRIP-MCNC: ABNORMAL MG/DL
RBC # BLD AUTO: 4.2 M/UL (ref 3.8–5.8)
RBC #/AREA URNS HPF: ABNORMAL /HPF
SODIUM SERPL-SCNC: 140 MMOL/L (ref 136–145)
SP GR UR STRIP: 1.02 (ref 1–1.03)
UROBILINOGEN UR STRIP-ACNC: 0.2 EU/DL (ref 0–1)
WBC #/AREA URNS HPF: ABNORMAL /HPF
WBC OTHER # BLD: 4.9 K/UL (ref 4.5–11.5)

## 2025-06-27 PROCEDURE — 83735 ASSAY OF MAGNESIUM: CPT

## 2025-06-27 PROCEDURE — 6370000000 HC RX 637 (ALT 250 FOR IP): Performed by: EMERGENCY MEDICINE

## 2025-06-27 PROCEDURE — 80053 COMPREHEN METABOLIC PANEL: CPT

## 2025-06-27 PROCEDURE — 2580000003 HC RX 258: Performed by: PHYSICIAN ASSISTANT

## 2025-06-27 PROCEDURE — 6360000004 HC RX CONTRAST MEDICATION: Performed by: RADIOLOGY

## 2025-06-27 PROCEDURE — 99285 EMERGENCY DEPT VISIT HI MDM: CPT

## 2025-06-27 PROCEDURE — 87086 URINE CULTURE/COLONY COUNT: CPT

## 2025-06-27 PROCEDURE — 84100 ASSAY OF PHOSPHORUS: CPT

## 2025-06-27 PROCEDURE — 83690 ASSAY OF LIPASE: CPT

## 2025-06-27 PROCEDURE — 74177 CT ABD & PELVIS W/CONTRAST: CPT

## 2025-06-27 PROCEDURE — 85025 COMPLETE CBC W/AUTO DIFF WBC: CPT

## 2025-06-27 PROCEDURE — 83605 ASSAY OF LACTIC ACID: CPT

## 2025-06-27 PROCEDURE — 81001 URINALYSIS AUTO W/SCOPE: CPT

## 2025-06-27 RX ORDER — ACETAMINOPHEN 500 MG
1000 TABLET ORAL ONCE
Status: COMPLETED | OUTPATIENT
Start: 2025-06-27 | End: 2025-06-27

## 2025-06-27 RX ORDER — 0.9 % SODIUM CHLORIDE 0.9 %
1000 INTRAVENOUS SOLUTION INTRAVENOUS ONCE
Status: COMPLETED | OUTPATIENT
Start: 2025-06-27 | End: 2025-06-27

## 2025-06-27 RX ORDER — IOPAMIDOL 755 MG/ML
75 INJECTION, SOLUTION INTRAVASCULAR
Status: COMPLETED | OUTPATIENT
Start: 2025-06-27 | End: 2025-06-27

## 2025-06-27 RX ADMIN — ACETAMINOPHEN 1000 MG: 500 TABLET ORAL at 20:22

## 2025-06-27 RX ADMIN — IOPAMIDOL 75 ML: 755 INJECTION, SOLUTION INTRAVENOUS at 21:02

## 2025-06-27 RX ADMIN — SODIUM CHLORIDE 1000 ML: 0.9 INJECTION, SOLUTION INTRAVENOUS at 20:08

## 2025-06-27 ASSESSMENT — PAIN DESCRIPTION - LOCATION: LOCATION: GENERALIZED;ABDOMEN

## 2025-06-27 ASSESSMENT — PAIN - FUNCTIONAL ASSESSMENT
PAIN_FUNCTIONAL_ASSESSMENT: 0-10
PAIN_FUNCTIONAL_ASSESSMENT: NONE - DENIES PAIN

## 2025-06-27 ASSESSMENT — PAIN DESCRIPTION - DESCRIPTORS: DESCRIPTORS: DISCOMFORT;SORE;ACHING

## 2025-06-27 ASSESSMENT — PAIN DESCRIPTION - PAIN TYPE: TYPE: ACUTE PAIN

## 2025-06-27 ASSESSMENT — LIFESTYLE VARIABLES
HOW OFTEN DO YOU HAVE A DRINK CONTAINING ALCOHOL: NEVER
HOW MANY STANDARD DRINKS CONTAINING ALCOHOL DO YOU HAVE ON A TYPICAL DAY: PATIENT DOES NOT DRINK

## 2025-06-27 ASSESSMENT — PAIN DESCRIPTION - FREQUENCY: FREQUENCY: CONTINUOUS

## 2025-06-27 ASSESSMENT — PAIN DESCRIPTION - ONSET: ONSET: ON-GOING

## 2025-06-27 ASSESSMENT — PAIN SCALES - GENERAL
PAINLEVEL_OUTOF10: 0
PAINLEVEL_OUTOF10: 5

## 2025-06-28 LAB
MICROORGANISM SPEC CULT: ABNORMAL
MICROORGANISM SPEC CULT: ABNORMAL
SPECIMEN DESCRIPTION: ABNORMAL

## 2025-06-28 NOTE — ED PROVIDER NOTES
ED PROVIDER NOTE    Chief Complaint   Patient presents with    Diarrhea     X1 week diarrhea, takes oral chemo, dehydrated       HPI:  6/27/25,   Time: 8:14 PM EDT       Edilberto Hillman is a 74 y.o. male presenting to the ED for diarrhea.  Ongoing for the last 4 days.  Patient is on chemotherapy for colon cancer.  He completed his 2-week cycle of oral chemo last week and is currently on a 1 week break.  This is his second cycle of chemo.  He had similar symptoms after completing the first cycle which resolved after IV fluids.  He went to see his oncology office today and was treated with IV fluids and given a prescription for Zofran, however his provider at [facility] recommended that he come to the ED for further evaluation.  He does endorse profuse diarrhea.  No black or bloody stools.  Associated nausea.  No vomiting.  He does endorse chills.  No fever, however on arrival to the ED today his temp is 37.9C.  Denies associated cough, chest pain, shortness of breath, neck stiffness, rash, vomiting.  Decreased appetite and p.o. intake.  Normal urine output.  He does endorse some pain in his right hip.  He does report a previous history of septic arthritis of the right hip which required irrigation and debridement with total hip arthroplasty about 1 year ago.    Chart review: hx of anemia, CAD s/p PCI, appendix ca, CKD, GERD, HTN, HLD, infection of right prosthetic hip joint    Reviewed outpatient hematology oncology progress note from 6/23/2025 by Niecy Mendez NP at [facility]:  Dx malignant neoplasm of sigmoid colon on capecitabine, recent dose reduction due to diarrhea. Dx hyperbilirubinemia. Dx hand foot syndrome.    Review of Systems:     Review of Systems  Pertinent positives and negatives as stated in HPI     --------------------------------------------- PAST HISTORY ---------------------------------------------  Past Medical History:   Past Medical History:   Diagnosis Date    Anemia associated

## 2025-06-28 NOTE — DISCHARGE INSTRUCTIONS
Return the emergency department if you have worsening pain, vomiting, unable to keep down food or drink, fever, black or bloody stool, or any other new or concerning symptoms.    Drink plenty of water.    Follow-up with your primary care physician and oncology team at the next available appointments.

## 2025-06-29 ENCOUNTER — APPOINTMENT (OUTPATIENT)
Dept: GENERAL RADIOLOGY | Age: 75
DRG: 392 | End: 2025-06-29
Payer: MEDICARE

## 2025-06-29 ENCOUNTER — APPOINTMENT (OUTPATIENT)
Dept: CT IMAGING | Age: 75
DRG: 392 | End: 2025-06-29
Payer: MEDICARE

## 2025-06-29 ENCOUNTER — HOSPITAL ENCOUNTER (INPATIENT)
Age: 75
LOS: 1 days | Discharge: HOME | DRG: 392 | End: 2025-07-01
Attending: STUDENT IN AN ORGANIZED HEALTH CARE EDUCATION/TRAINING PROGRAM | Admitting: STUDENT IN AN ORGANIZED HEALTH CARE EDUCATION/TRAINING PROGRAM
Payer: MEDICARE

## 2025-06-29 DIAGNOSIS — B34.1 ENTEROVIRUS INFECTION: ICD-10-CM

## 2025-06-29 DIAGNOSIS — R19.7 DIARRHEA, UNSPECIFIED TYPE: ICD-10-CM

## 2025-06-29 DIAGNOSIS — B34.8 RHINOVIRUS INFECTION: ICD-10-CM

## 2025-06-29 DIAGNOSIS — K52.9 ENTEROCOLITIS: Primary | ICD-10-CM

## 2025-06-29 DIAGNOSIS — E86.0 MILD DEHYDRATION: ICD-10-CM

## 2025-06-29 DIAGNOSIS — D84.9 IMMUNOCOMPROMISED STATE: ICD-10-CM

## 2025-06-29 DIAGNOSIS — R50.9 FEVER, UNSPECIFIED FEVER CAUSE: ICD-10-CM

## 2025-06-29 DIAGNOSIS — R10.30 LOWER ABDOMINAL PAIN: ICD-10-CM

## 2025-06-29 LAB
ALBUMIN SERPL-MCNC: 3 G/DL (ref 3.5–5.2)
ALP SERPL-CCNC: 46 U/L (ref 40–129)
ALT SERPL-CCNC: 10 U/L (ref 0–50)
ANION GAP SERPL CALCULATED.3IONS-SCNC: 12 MMOL/L (ref 7–16)
AST SERPL-CCNC: 19 U/L (ref 0–50)
BASOPHILS # BLD: 0 K/UL (ref 0–0.2)
BASOPHILS NFR BLD: 0 % (ref 0–2)
BILIRUB DIRECT SERPL-MCNC: 0.4 MG/DL (ref 0–0.2)
BILIRUB INDIRECT SERPL-MCNC: 0.6 MG/DL (ref 0–1)
BILIRUB SERPL-MCNC: 1 MG/DL (ref 0–1.2)
BILIRUB UR QL STRIP: ABNORMAL
BUN SERPL-MCNC: 15 MG/DL (ref 8–23)
CALCIUM SERPL-MCNC: 8.4 MG/DL (ref 8.8–10.2)
CHLORIDE SERPL-SCNC: 102 MMOL/L (ref 98–107)
CLARITY UR: CLEAR
CO2 SERPL-SCNC: 21 MMOL/L (ref 22–29)
COLOR UR: YELLOW
CREAT SERPL-MCNC: 1.1 MG/DL (ref 0.7–1.2)
EOSINOPHIL # BLD: 0.04 K/UL (ref 0.05–0.5)
EOSINOPHILS RELATIVE PERCENT: 1 % (ref 0–6)
ERYTHROCYTE [DISTWIDTH] IN BLOOD BY AUTOMATED COUNT: 18.5 % (ref 11.5–15)
GFR, ESTIMATED: 67 ML/MIN/1.73M2
GLUCOSE SERPL-MCNC: 127 MG/DL (ref 74–99)
GLUCOSE UR STRIP-MCNC: NEGATIVE MG/DL
HCT VFR BLD AUTO: 32.1 % (ref 37–54)
HGB BLD-MCNC: 10.6 G/DL (ref 12.5–16.5)
HGB UR QL STRIP.AUTO: NEGATIVE
INR PPP: 1.8
KETONES UR STRIP-MCNC: NEGATIVE MG/DL
LACTATE BLDV-SCNC: 1.1 MMOL/L (ref 0.5–1.9)
LACTATE BLDV-SCNC: 1.3 MMOL/L (ref 0.5–1.9)
LEUKOCYTE ESTERASE UR QL STRIP: NEGATIVE
LIPASE SERPL-CCNC: 71 U/L (ref 13–60)
LYMPHOCYTES NFR BLD: 0.67 K/UL (ref 1.5–4)
LYMPHOCYTES RELATIVE PERCENT: 16 % (ref 20–42)
MAGNESIUM SERPL-MCNC: 1.6 MG/DL (ref 1.6–2.4)
MCH RBC QN AUTO: 29.1 PG (ref 26–35)
MCHC RBC AUTO-ENTMCNC: 33 G/DL (ref 32–34.5)
MCV RBC AUTO: 88.2 FL (ref 80–99.9)
MONOCYTES NFR BLD: 0.9 K/UL (ref 0.1–0.95)
MONOCYTES NFR BLD: 21 % (ref 2–12)
NEUTROPHILS NFR BLD: 63 % (ref 43–80)
NEUTS SEG NFR BLD: 2.69 K/UL (ref 1.8–7.3)
NITRITE UR QL STRIP: NEGATIVE
NUCLEATED RED BLOOD CELLS: 1 PER 100 WBC
PH UR STRIP: 6 [PH] (ref 5–8)
PLATELET # BLD AUTO: 132 K/UL (ref 130–450)
PMV BLD AUTO: 10.3 FL (ref 7–12)
POTASSIUM SERPL-SCNC: 2.9 MMOL/L (ref 3.5–5.1)
PROT SERPL-MCNC: 5.2 G/DL (ref 6.4–8.3)
PROT UR STRIP-MCNC: 30 MG/DL
PROTHROMBIN TIME: 18.7 SEC (ref 9.3–12.4)
RBC # BLD AUTO: 3.64 M/UL (ref 3.8–5.8)
RBC # BLD: ABNORMAL 10*6/UL
RBC #/AREA URNS HPF: ABNORMAL /HPF
SODIUM SERPL-SCNC: 135 MMOL/L (ref 136–145)
SP GR UR STRIP: 1.01 (ref 1–1.03)
TROPONIN I SERPL HS-MCNC: 25 NG/L (ref 0–22)
TROPONIN I SERPL HS-MCNC: 28 NG/L (ref 0–22)
UROBILINOGEN UR STRIP-ACNC: 0.2 EU/DL (ref 0–1)
WBC # BLD: ABNORMAL 10*3/UL
WBC #/AREA URNS HPF: ABNORMAL /HPF
WBC OTHER # BLD: 4.3 K/UL (ref 4.5–11.5)

## 2025-06-29 PROCEDURE — 82705 FATS/LIPIDS FECES QUAL: CPT

## 2025-06-29 PROCEDURE — 93005 ELECTROCARDIOGRAM TRACING: CPT | Performed by: STUDENT IN AN ORGANIZED HEALTH CARE EDUCATION/TRAINING PROGRAM

## 2025-06-29 PROCEDURE — 6360000004 HC RX CONTRAST MEDICATION: Performed by: RADIOLOGY

## 2025-06-29 PROCEDURE — 80053 COMPREHEN METABOLIC PANEL: CPT

## 2025-06-29 PROCEDURE — 83735 ASSAY OF MAGNESIUM: CPT

## 2025-06-29 PROCEDURE — 82270 OCCULT BLOOD FECES: CPT

## 2025-06-29 PROCEDURE — 0202U NFCT DS 22 TRGT SARS-COV-2: CPT

## 2025-06-29 PROCEDURE — 83690 ASSAY OF LIPASE: CPT

## 2025-06-29 PROCEDURE — 87086 URINE CULTURE/COLONY COUNT: CPT

## 2025-06-29 PROCEDURE — 87427 SHIGA-LIKE TOXIN AG IA: CPT

## 2025-06-29 PROCEDURE — 85025 COMPLETE CBC W/AUTO DIFF WBC: CPT

## 2025-06-29 PROCEDURE — 84484 ASSAY OF TROPONIN QUANT: CPT

## 2025-06-29 PROCEDURE — 87046 STOOL CULTR AEROBIC BACT EA: CPT

## 2025-06-29 PROCEDURE — 96365 THER/PROPH/DIAG IV INF INIT: CPT

## 2025-06-29 PROCEDURE — 96361 HYDRATE IV INFUSION ADD-ON: CPT

## 2025-06-29 PROCEDURE — 87449 NOS EACH ORGANISM AG IA: CPT

## 2025-06-29 PROCEDURE — 87040 BLOOD CULTURE FOR BACTERIA: CPT

## 2025-06-29 PROCEDURE — 87045 FECES CULTURE AEROBIC BACT: CPT

## 2025-06-29 PROCEDURE — 82248 BILIRUBIN DIRECT: CPT

## 2025-06-29 PROCEDURE — 85610 PROTHROMBIN TIME: CPT

## 2025-06-29 PROCEDURE — 83605 ASSAY OF LACTIC ACID: CPT

## 2025-06-29 PROCEDURE — 71045 X-RAY EXAM CHEST 1 VIEW: CPT

## 2025-06-29 PROCEDURE — 2580000003 HC RX 258: Performed by: STUDENT IN AN ORGANIZED HEALTH CARE EDUCATION/TRAINING PROGRAM

## 2025-06-29 PROCEDURE — 74177 CT ABD & PELVIS W/CONTRAST: CPT

## 2025-06-29 PROCEDURE — 87324 CLOSTRIDIUM AG IA: CPT

## 2025-06-29 PROCEDURE — 6360000002 HC RX W HCPCS: Performed by: STUDENT IN AN ORGANIZED HEALTH CARE EDUCATION/TRAINING PROGRAM

## 2025-06-29 PROCEDURE — 81001 URINALYSIS AUTO W/SCOPE: CPT

## 2025-06-29 PROCEDURE — 87329 GIARDIA AG IA: CPT

## 2025-06-29 PROCEDURE — 6370000000 HC RX 637 (ALT 250 FOR IP): Performed by: STUDENT IN AN ORGANIZED HEALTH CARE EDUCATION/TRAINING PROGRAM

## 2025-06-29 PROCEDURE — 87425 ROTAVIRUS AG IA: CPT

## 2025-06-29 PROCEDURE — 99285 EMERGENCY DEPT VISIT HI MDM: CPT

## 2025-06-29 RX ORDER — METRONIDAZOLE 500 MG/100ML
500 INJECTION, SOLUTION INTRAVENOUS ONCE
Status: COMPLETED | OUTPATIENT
Start: 2025-06-30 | End: 2025-06-30

## 2025-06-29 RX ORDER — POTASSIUM CHLORIDE 7.45 MG/ML
10 INJECTION INTRAVENOUS
Status: COMPLETED | OUTPATIENT
Start: 2025-06-29 | End: 2025-06-29

## 2025-06-29 RX ORDER — 0.9 % SODIUM CHLORIDE 0.9 %
30 INTRAVENOUS SOLUTION INTRAVENOUS ONCE
Status: COMPLETED | OUTPATIENT
Start: 2025-06-29 | End: 2025-06-29

## 2025-06-29 RX ORDER — ACETAMINOPHEN 500 MG
1000 TABLET ORAL ONCE
Status: DISCONTINUED | OUTPATIENT
Start: 2025-06-29 | End: 2025-06-29

## 2025-06-29 RX ORDER — IOPAMIDOL 755 MG/ML
75 INJECTION, SOLUTION INTRAVASCULAR
Status: COMPLETED | OUTPATIENT
Start: 2025-06-29 | End: 2025-06-29

## 2025-06-29 RX ORDER — ACETAMINOPHEN 325 MG/1
650 TABLET ORAL ONCE
Status: COMPLETED | OUTPATIENT
Start: 2025-06-29 | End: 2025-06-29

## 2025-06-29 RX ORDER — POTASSIUM CHLORIDE 1500 MG/1
40 TABLET, EXTENDED RELEASE ORAL ONCE
Status: COMPLETED | OUTPATIENT
Start: 2025-06-29 | End: 2025-06-29

## 2025-06-29 RX ADMIN — IOPAMIDOL 75 ML: 755 INJECTION, SOLUTION INTRAVENOUS at 21:40

## 2025-06-29 RX ADMIN — POTASSIUM CHLORIDE 40 MEQ: 1500 TABLET, EXTENDED RELEASE ORAL at 21:28

## 2025-06-29 RX ADMIN — POTASSIUM CHLORIDE 10 MEQ: 7.46 INJECTION, SOLUTION INTRAVENOUS at 21:49

## 2025-06-29 RX ADMIN — ACETAMINOPHEN 650 MG: 325 TABLET ORAL at 21:28

## 2025-06-29 RX ADMIN — SODIUM CHLORIDE 2611.5 ML: 0.9 INJECTION, SOLUTION INTRAVENOUS at 20:53

## 2025-06-29 ASSESSMENT — PAIN SCALES - GENERAL: PAINLEVEL_OUTOF10: 3

## 2025-06-29 ASSESSMENT — PAIN - FUNCTIONAL ASSESSMENT: PAIN_FUNCTIONAL_ASSESSMENT: 0-10

## 2025-06-29 ASSESSMENT — PAIN DESCRIPTION - LOCATION: LOCATION: ABDOMEN

## 2025-06-29 ASSESSMENT — PAIN DESCRIPTION - FREQUENCY: FREQUENCY: INTERMITTENT

## 2025-06-29 ASSESSMENT — PAIN DESCRIPTION - DESCRIPTORS: DESCRIPTORS: SPASM

## 2025-06-30 PROBLEM — E87.6 HYPOKALEMIA: Status: ACTIVE | Noted: 2025-06-30

## 2025-06-30 PROBLEM — C18.9 COLON CANCER (HCC): Status: ACTIVE | Noted: 2025-06-30

## 2025-06-30 PROBLEM — K52.9 ENTEROCOLITIS: Status: ACTIVE | Noted: 2025-06-30

## 2025-06-30 LAB
ALBUMIN SERPL-MCNC: 2.7 G/DL (ref 3.5–5.2)
ALP SERPL-CCNC: 43 U/L (ref 40–129)
ALT SERPL-CCNC: 9 U/L (ref 0–50)
ANION GAP SERPL CALCULATED.3IONS-SCNC: 11 MMOL/L (ref 7–16)
AST SERPL-CCNC: 18 U/L (ref 0–50)
B PARAP IS1001 DNA NPH QL NAA+NON-PROBE: NOT DETECTED
B PERT DNA SPEC QL NAA+PROBE: NOT DETECTED
BILIRUB SERPL-MCNC: 0.9 MG/DL (ref 0–1.2)
BUN SERPL-MCNC: 11 MG/DL (ref 8–23)
C PNEUM DNA NPH QL NAA+NON-PROBE: NOT DETECTED
CALCIUM SERPL-MCNC: 8 MG/DL (ref 8.8–10.2)
CHLORIDE SERPL-SCNC: 108 MMOL/L (ref 98–107)
CO2 SERPL-SCNC: 21 MMOL/L (ref 22–29)
CREAT SERPL-MCNC: 1 MG/DL (ref 0.7–1.2)
DATE, STOOL #1: NORMAL
EKG ATRIAL RATE: 45 BPM
EKG P AXIS: 24 DEGREES
EKG P-R INTERVAL: 168 MS
EKG Q-T INTERVAL: 520 MS
EKG QRS DURATION: 106 MS
EKG QTC CALCULATION (BAZETT): 449 MS
EKG R AXIS: -35 DEGREES
EKG T AXIS: 9 DEGREES
EKG VENTRICULAR RATE: 45 BPM
FLUAV RNA NPH QL NAA+NON-PROBE: NOT DETECTED
FLUBV RNA NPH QL NAA+NON-PROBE: NOT DETECTED
G LAMBLIA AG STL QL IA: NEGATIVE
GFR, ESTIMATED: 82 ML/MIN/1.73M2
GLUCOSE SERPL-MCNC: 102 MG/DL (ref 74–99)
HADV DNA NPH QL NAA+NON-PROBE: NOT DETECTED
HCOV 229E RNA NPH QL NAA+NON-PROBE: NOT DETECTED
HCOV HKU1 RNA NPH QL NAA+NON-PROBE: NOT DETECTED
HCOV NL63 RNA NPH QL NAA+NON-PROBE: NOT DETECTED
HCOV OC43 RNA NPH QL NAA+NON-PROBE: NOT DETECTED
HEMOCCULT SP1 STL QL: NEGATIVE
HMPV RNA NPH QL NAA+NON-PROBE: NOT DETECTED
HPIV1 RNA NPH QL NAA+NON-PROBE: NOT DETECTED
HPIV2 RNA NPH QL NAA+NON-PROBE: NOT DETECTED
HPIV3 RNA NPH QL NAA+NON-PROBE: NOT DETECTED
HPIV4 RNA NPH QL NAA+NON-PROBE: NOT DETECTED
M PNEUMO DNA NPH QL NAA+NON-PROBE: NOT DETECTED
MAGNESIUM SERPL-MCNC: 1.6 MG/DL (ref 1.6–2.4)
POTASSIUM SERPL-SCNC: 3.5 MMOL/L (ref 3.5–5.1)
PROT SERPL-MCNC: 4.8 G/DL (ref 6.4–8.3)
ROTAVIRUS ANTIGEN: NEGATIVE
RSV RNA NPH QL NAA+NON-PROBE: NOT DETECTED
RV+EV RNA NPH QL NAA+NON-PROBE: DETECTED
SARS-COV-2 RNA NPH QL NAA+NON-PROBE: NOT DETECTED
SODIUM SERPL-SCNC: 139 MMOL/L (ref 136–145)
SOURCE, 60200063: NORMAL
SOURCE: NORMAL
SPECIMEN DESCRIPTION: ABNORMAL
SPECIMEN DESCRIPTION: NORMAL
TIME, STOOL #1: NORMAL

## 2025-06-30 PROCEDURE — G0378 HOSPITAL OBSERVATION PER HR: HCPCS

## 2025-06-30 PROCEDURE — 6370000000 HC RX 637 (ALT 250 FOR IP): Performed by: NURSE PRACTITIONER

## 2025-06-30 PROCEDURE — 6360000002 HC RX W HCPCS

## 2025-06-30 PROCEDURE — APPSS45 APP SPLIT SHARED TIME 31-45 MINUTES

## 2025-06-30 PROCEDURE — 96372 THER/PROPH/DIAG INJ SC/IM: CPT

## 2025-06-30 PROCEDURE — 99223 1ST HOSP IP/OBS HIGH 75: CPT | Performed by: STUDENT IN AN ORGANIZED HEALTH CARE EDUCATION/TRAINING PROGRAM

## 2025-06-30 PROCEDURE — 96367 TX/PROPH/DG ADDL SEQ IV INF: CPT

## 2025-06-30 PROCEDURE — 80053 COMPREHEN METABOLIC PANEL: CPT

## 2025-06-30 PROCEDURE — 83735 ASSAY OF MAGNESIUM: CPT

## 2025-06-30 PROCEDURE — 2500000003 HC RX 250 WO HCPCS

## 2025-06-30 PROCEDURE — 2580000003 HC RX 258

## 2025-06-30 PROCEDURE — 2060000000 HC ICU INTERMEDIATE R&B

## 2025-06-30 PROCEDURE — 1200000000 HC SEMI PRIVATE

## 2025-06-30 PROCEDURE — 93010 ELECTROCARDIOGRAM REPORT: CPT | Performed by: INTERNAL MEDICINE

## 2025-06-30 PROCEDURE — 6360000002 HC RX W HCPCS: Performed by: STUDENT IN AN ORGANIZED HEALTH CARE EDUCATION/TRAINING PROGRAM

## 2025-06-30 PROCEDURE — 96366 THER/PROPH/DIAG IV INF ADDON: CPT

## 2025-06-30 RX ORDER — POTASSIUM CHLORIDE 1500 MG/1
40 TABLET, EXTENDED RELEASE ORAL PRN
Status: DISCONTINUED | OUTPATIENT
Start: 2025-06-30 | End: 2025-07-01 | Stop reason: HOSPADM

## 2025-06-30 RX ORDER — ACETAMINOPHEN 650 MG/1
650 SUPPOSITORY RECTAL EVERY 6 HOURS PRN
Status: DISCONTINUED | OUTPATIENT
Start: 2025-06-30 | End: 2025-07-01 | Stop reason: HOSPADM

## 2025-06-30 RX ORDER — MAGNESIUM SULFATE IN WATER 40 MG/ML
2000 INJECTION, SOLUTION INTRAVENOUS PRN
Status: DISCONTINUED | OUTPATIENT
Start: 2025-06-30 | End: 2025-07-01 | Stop reason: HOSPADM

## 2025-06-30 RX ORDER — POTASSIUM CHLORIDE 7.45 MG/ML
10 INJECTION INTRAVENOUS PRN
Status: DISCONTINUED | OUTPATIENT
Start: 2025-06-30 | End: 2025-07-01 | Stop reason: HOSPADM

## 2025-06-30 RX ORDER — SODIUM CHLORIDE 9 MG/ML
INJECTION, SOLUTION INTRAVENOUS PRN
Status: DISCONTINUED | OUTPATIENT
Start: 2025-06-30 | End: 2025-07-01 | Stop reason: HOSPADM

## 2025-06-30 RX ORDER — POLYETHYLENE GLYCOL 3350 17 G/17G
17 POWDER, FOR SOLUTION ORAL DAILY PRN
Status: DISCONTINUED | OUTPATIENT
Start: 2025-06-30 | End: 2025-07-01 | Stop reason: HOSPADM

## 2025-06-30 RX ORDER — SODIUM CHLORIDE 0.9 % (FLUSH) 0.9 %
5-40 SYRINGE (ML) INJECTION EVERY 12 HOURS SCHEDULED
Status: DISCONTINUED | OUTPATIENT
Start: 2025-06-30 | End: 2025-07-01 | Stop reason: HOSPADM

## 2025-06-30 RX ORDER — CIPROFLOXACIN 2 MG/ML
400 INJECTION, SOLUTION INTRAVENOUS EVERY 12 HOURS
Status: DISCONTINUED | OUTPATIENT
Start: 2025-06-30 | End: 2025-07-01

## 2025-06-30 RX ORDER — ACETAMINOPHEN 325 MG/1
650 TABLET ORAL EVERY 6 HOURS PRN
Status: DISCONTINUED | OUTPATIENT
Start: 2025-06-30 | End: 2025-07-01 | Stop reason: HOSPADM

## 2025-06-30 RX ORDER — METRONIDAZOLE 500 MG/100ML
500 INJECTION, SOLUTION INTRAVENOUS EVERY 8 HOURS
Status: DISCONTINUED | OUTPATIENT
Start: 2025-06-30 | End: 2025-07-01

## 2025-06-30 RX ORDER — ENOXAPARIN SODIUM 100 MG/ML
40 INJECTION SUBCUTANEOUS DAILY
Status: DISCONTINUED | OUTPATIENT
Start: 2025-06-30 | End: 2025-07-01 | Stop reason: HOSPADM

## 2025-06-30 RX ORDER — SODIUM CHLORIDE 0.9 % (FLUSH) 0.9 %
5-40 SYRINGE (ML) INJECTION PRN
Status: DISCONTINUED | OUTPATIENT
Start: 2025-06-30 | End: 2025-07-01 | Stop reason: HOSPADM

## 2025-06-30 RX ORDER — ONDANSETRON 4 MG/1
4 TABLET, FILM COATED ORAL EVERY 8 HOURS PRN
Status: DISCONTINUED | OUTPATIENT
Start: 2025-06-30 | End: 2025-07-01 | Stop reason: HOSPADM

## 2025-06-30 RX ORDER — SODIUM CHLORIDE 9 MG/ML
INJECTION, SOLUTION INTRAVENOUS CONTINUOUS
Status: ACTIVE | OUTPATIENT
Start: 2025-06-30 | End: 2025-07-01

## 2025-06-30 RX ADMIN — SODIUM CHLORIDE: 9 INJECTION, SOLUTION INTRAVENOUS at 02:05

## 2025-06-30 RX ADMIN — METRONIDAZOLE 500 MG: 500 INJECTION, SOLUTION INTRAVENOUS at 16:42

## 2025-06-30 RX ADMIN — ONDANSETRON HYDROCHLORIDE 4 MG: 4 TABLET, FILM COATED ORAL at 21:51

## 2025-06-30 RX ADMIN — SODIUM CHLORIDE: 9 INJECTION, SOLUTION INTRAVENOUS at 13:38

## 2025-06-30 RX ADMIN — SODIUM CHLORIDE, PRESERVATIVE FREE 10 ML: 5 INJECTION INTRAVENOUS at 09:16

## 2025-06-30 RX ADMIN — ENOXAPARIN SODIUM 40 MG: 100 INJECTION SUBCUTANEOUS at 09:15

## 2025-06-30 RX ADMIN — METRONIDAZOLE 500 MG: 500 INJECTION, SOLUTION INTRAVENOUS at 00:58

## 2025-06-30 RX ADMIN — CIPROFLOXACIN 400 MG: 400 INJECTION, SOLUTION INTRAVENOUS at 18:24

## 2025-06-30 RX ADMIN — METRONIDAZOLE 500 MG: 500 INJECTION, SOLUTION INTRAVENOUS at 09:13

## 2025-06-30 RX ADMIN — CIPROFLOXACIN 400 MG: 400 INJECTION, SOLUTION INTRAVENOUS at 06:29

## 2025-06-30 ASSESSMENT — ENCOUNTER SYMPTOMS
NAUSEA: 0
COUGH: 0
SHORTNESS OF BREATH: 0
CONSTIPATION: 0
BLOOD IN STOOL: 0
DIARRHEA: 1
ABDOMINAL PAIN: 1
VOMITING: 0

## 2025-06-30 NOTE — H&P
(HCC) 08/05/2024    Spondylosis     L4&5    Urinary incontinence     Ventricular ectopic beats 04/12/2013       Surgical History:  Past Surgical History:   Procedure Laterality Date    APPENDECTOMY  2/2023    COLECTOMY Right 03/22/2023    LAPAROSCOPIC ROBOTIC XI ASSISTED RIGHT HEMICOLECTOMY performed by Indira Arce MD at [facility] OR    COLON SURGERY  03/18/2025    [facility]    COLONOSCOPY      COLONOSCOPY N/A 03/15/2023    COLONOSCOPY WITH BIOPSY performed by Indira Arce MD at [facility] ENDOSCOPY    COLONOSCOPY N/A 3/15/2024    COLONOSCOPY BIOPSY performed by Indira Arce MD at [facility] ENDOSCOPY    CORONARY ANGIOPLASTY      with stent 2011     DIAGNOSTIC CARDIAC CATH LAB PROCEDURE      ENDOSCOPY, COLON, DIAGNOSTIC      INSERT PICC LINE  08/09/2024    JOINT REPLACEMENT Right 1990, 11/2011    hip x2    JOINT REPLACEMENT Left 1990, 2007    hip x2     LAPAROSCOPIC APPENDECTOMY N/A 02/09/2023    APPENDECTOMY LAPAROSCOPIC performed by Indira Arce MD at [facility] OR    PROSTATE SURGERY  06/09/2023    Holep-Laser    REVISION TOTAL HIP ARTHROPLASTY Right 8/7/2024    RIGHT HIP TOTAL ARTHROPLASTY IRRIGATION AND DEBRIDEMENT  WITH HEAD AND LINER EXCHANGE performed by Won Jones MD at [facility] OR    SUBTOTAL COLECTOMY  3/2023    TONSILLECTOMY      UPPER GASTROINTESTINAL ENDOSCOPY N/A 05/22/2020    EGD BIOPSY performed by Indira Arce MD at [facility] ENDOSCOPY       Medications Prior to Admission:    Prior to Admission medications    Medication Sig Start Date End Date Taking? Authorizing Provider   olmesartan (BENICAR) 40 MG tablet TAKE 1 TABLET DAILY 6/4/25   Corey Green DO   sertraline (ZOLOFT) 50 MG tablet TAKE 1 TABLET DAILY 4/25/25   Corey Green DO   fluticasone (FLONASE) 50 MCG/ACT nasal spray 1 spray by Each Nostril route daily 4/24/25   Corey Green DO   amLODIPine (NORVASC) 5 MG tablet TAKE 1 TABLET AT BEDTIME 4/21/25   Corey Green DO  (195 lb)   SpO2 100%   BMI 26.45 kg/m²     General Appearance: alert and oriented to person, place and time and in no acute distress  Skin: warm and dry  Head: normocephalic and atraumatic  Eyes: pupils equal, round, and reactive to light, conjunctivae normal  Pulmonary/Chest: clear to auscultation bilaterally- no wheezes, rales or rhonchi, normal air movement, no respiratory distress  Cardiovascular: Bradycardic, normal S1 and S2  Abdomen: soft, tender mid lower abdomen, non-distended, normal bowel sounds, no masses or organomegaly  Extremities: no cyanosis, no clubbing and no edema  Neurologic: speech normal        LABS:  Recent Labs     06/27/25 1930 06/29/25 2020    135*   K 3.5 2.9*    102   CO2 24 21*   BUN 16 15   CREATININE 1.1 1.1   GLUCOSE 130* 127*   CALCIUM 8.9 8.4*       Recent Labs     06/27/25 1930 06/29/25 2020   WBC 4.9 4.3*   RBC 4.20 3.64*   HGB 12.3* 10.6*   HCT 35.9* 32.1*   MCV 85.5 88.2   MCH 29.3 29.1   MCHC 34.3 33.0   RDW 18.2* 18.5*    132   MPV 9.7 10.3       No results for input(s): \"POCGLU\" in the last 72 hours.        Radiology:   CT ABDOMEN PELVIS W IV CONTRAST Additional Contrast? None   Final Result   Interval worsening of enterocolitis.         XR CHEST PORTABLE   Final Result   No acute process.             EKG: Not available    ASSESSMENT:      Principal Problem:    Enterocolitis  Active Problems:    Primary hypertension    Colon cancer (HCC)    Hypokalemia  Resolved Problems:    * No resolved hospital problems. *      PLAN:    Enterocolitis: Patient presents with sepsis criteria leukopenia and fever.  Treated with 30 mL/kg NS bolus.  Patient has an allergy to ceftriaxone, will start ciprofloxacin.  Continue Flagyl.  Stool culture, C. difficile, rotavirus, fecal fat and Giardia pending.  Follow blood cultures.  Hypokalemia: Replaced in ED here.  Trend daily CMP and replace as needed.  CAD: Continue aspirin.  Colon cancer: S/p sigmoid resection with

## 2025-06-30 NOTE — PLAN OF CARE
Problem: Discharge Planning  Goal: Discharge to home or other facility with appropriate resources  Outcome: Progressing     Problem: Pain  Goal: Verbalizes/displays adequate comfort level or baseline comfort level  Outcome: Progressing      Plastic Surgeon (C): Dr Bingham

## 2025-06-30 NOTE — ED NOTES
ED to Inpatient Handoff Report    Notified [address] that electronic handoff available and patient ready for transport to room 623.    Safety Risks: None identified    Patient in Restraints: no    Constant Observer or Patient : no    Telemetry Monitoring Ordered: Yes          Order to transfer to unit without monitor: YES    Last MEWS:  Time completed: 0055    Deterioration Index: 20.88    Vitals:    06/29/25 1940 06/29/25 2156 06/29/25 2246 06/30/25 0055   BP: (!) 107/46 (!) 143/75 121/60 135/68   Pulse: 59 54 (!) 49 (!) 47   Resp: 16 15 17 14   Temp: (!) 101.3 °F (38.5 °C)  98.2 °F (36.8 °C)    TempSrc: Oral      SpO2: 97% 98% 96% 97%   Weight: 88.5 kg (195 lb)      Height: 1.829 m (6')          Opportunity for questions and clarification was provided.

## 2025-06-30 NOTE — PLAN OF CARE
Problem: Discharge Planning  Goal: Discharge to home or other facility with appropriate resources  6/30/2025 0745 by Deepa Barbosa RN  Outcome: Progressing  Flowsheets (Taken 6/30/2025 0743)  Discharge to home or other facility with appropriate resources: Identify barriers to discharge with patient and caregiver  6/30/2025 0236 by Driss Gonzalez RN  Outcome: Progressing     Problem: Pain  Goal: Verbalizes/displays adequate comfort level or baseline comfort level  6/30/2025 0745 by Deepa Barbosa RN  Outcome: Progressing  Flowsheets (Taken 6/30/2025 0743)  Verbalizes/displays adequate comfort level or baseline comfort level:   Encourage patient to monitor pain and request assistance   Assess pain using appropriate pain scale   Administer analgesics based on type and severity of pain and evaluate response   Implement non-pharmacological measures as appropriate and evaluate response   Consider cultural and social influences on pain and pain management   Notify Licensed Independent Practitioner if interventions unsuccessful or patient reports new pain  6/30/2025 0236 by Driss Gonzalez RN  Outcome: Progressing     Problem: Metabolic/Fluid and Electrolytes - Adult  Goal: Electrolytes maintained within normal limits  6/30/2025 0745 by Deepa Barbosa RN  Outcome: Progressing  6/30/2025 0744 by Deepa Barbosa RN  Outcome: Progressing  Flowsheets (Taken 6/30/2025 0743)  Electrolytes maintained within normal limits:   Monitor labs and assess patient for signs and symptoms of electrolyte imbalances   Administer electrolyte replacement as ordered   Monitor response to electrolyte replacements, including repeat lab results as appropriate  Goal: Hemodynamic stability and optimal renal function maintained  Outcome: Progressing  Flowsheets (Taken 6/30/2025 0743)  Hemodynamic stability and optimal renal function maintained:   Monitor labs and assess for signs and symptoms of volume excess or deficit   Monitor intake, output

## 2025-06-30 NOTE — ED PROVIDER NOTES
[facility] EMERGENCY DEPARTMENT  EMERGENCY DEPARTMENT ENCOUNTER        Pt Name: Edilberto Hillman  MRN: [id number]  Birthdate 1950  Date of evaluation: 6/29/2025  Provider: Nu Park DO  PCP: Corey Green DO  Note Started: 12:25 AM EDT 6/30/25    CHIEF COMPLAINT       Chief Complaint   Patient presents with    Abdominal Pain     Seen at [place] Friday. Hx: colon CA. Takes oral chemo     Fever     103 day    Fatigue     Anorexia, chemo pt       HISTORY OF PRESENT ILLNESS: 1 or more Elements     Limitations to history : None    Edilberto Hillman is a 74 y.o. male with past medical history of colon cancer, s/p resection and on oral chemotherapy, who presents to the ED for evaluation of abdominal pain diarrhea and fever.  Patient was seen at [place] on Friday, he had a borderline temperature at the time, CT abdomen pelvis at that time was unremarkable.  Patient states that he has been having intermittent bouts of solid stool versus loose stools.  He denies any black or bloody stools.  He has been feeling fatigued as well with decreased appetite/decreased p.o. intake.  He denies any emesis.  He has not had any cough, sore throat, or abnormal urinary symptoms      Nursing Notes were all reviewed and agreed with or any disagreements were addressed in the HPI.      REVIEW OF EXTERNAL NOTE :       Reviewed documentation from ED encounter on 6/27/2025 and the patient was seen for chemotherapy induced diarrhea and dehydration.    Chart Review/External Note Review    Last Echo reviewed by Me:  No results found for: \"LVEF\", \"LVEFMODE\"        Controlled Substance Monitoring:    Acute and Chronic Pain Monitoring:        No data to display                      REVIEW OF SYSTEMS :      Review of Systems   Constitutional:  Positive for appetite change (decreased), fatigue and fever. Negative for chills.   Respiratory:  Negative for cough and shortness of breath.

## 2025-06-30 NOTE — PROGRESS NOTES
4 Eyes Skin Assessment     NAME:  Edilberto Hillman  YOB: 1950  MEDICAL RECORD NUMBER:  [id number]    The patient is being assessed for  Admission    I agree that at least one RN has performed a thorough Head to Toe Skin Assessment on the patient. ALL assessment sites listed below have been assessed.      Areas assessed by both nurses:    Head, Face, Ears, Shoulders, Back, Chest, Arms, Elbows, Hands, Sacrum. Buttock, Coccyx, Ischium, Legs. Feet and Heels, and Under Medical Devices         Does the Patient have a Wound? No noted wound(s)       Edward Prevention initiated by RN: No  Wound Care Orders initiated by RN: No    Pressure Injury (Stage 1,2,3,4, Unstageable, DTI, NWPT, and Complex wounds) if present, place Wound referral order by RN under : No    New Ostomies, if present place, Ostomy referral order under : No     Nurse 1 eSignature: Electronically signed by Driss Gonzalez RN on 6/30/25 at 3:00 AM EDT    **SHARE this note so that the co-signing nurse can place an eSignature**    Nurse 2 eSignature: Electronically signed by Paradise Li RN on 6/30/25 at 3:01 AM EDT

## 2025-07-01 VITALS
OXYGEN SATURATION: 97 % | TEMPERATURE: 98.3 F | BODY MASS INDEX: 26.41 KG/M2 | HEART RATE: 82 BPM | SYSTOLIC BLOOD PRESSURE: 145 MMHG | DIASTOLIC BLOOD PRESSURE: 61 MMHG | RESPIRATION RATE: 18 BRPM | HEIGHT: 72 IN | WEIGHT: 195 LBS

## 2025-07-01 LAB
ALBUMIN SERPL-MCNC: 2.7 G/DL (ref 3.5–5.2)
ALP SERPL-CCNC: 50 U/L (ref 40–129)
ALT SERPL-CCNC: 10 U/L (ref 0–50)
ANION GAP SERPL CALCULATED.3IONS-SCNC: 12 MMOL/L (ref 7–16)
AST SERPL-CCNC: 21 U/L (ref 0–50)
BASOPHILS # BLD: 0.01 K/UL (ref 0–0.2)
BASOPHILS NFR BLD: 0 % (ref 0–2)
BILIRUB SERPL-MCNC: 1 MG/DL (ref 0–1.2)
BUN SERPL-MCNC: 9 MG/DL (ref 8–23)
CALCIUM SERPL-MCNC: 8.5 MG/DL (ref 8.8–10.2)
CHLORIDE SERPL-SCNC: 105 MMOL/L (ref 98–107)
CO2 SERPL-SCNC: 20 MMOL/L (ref 22–29)
CREAT SERPL-MCNC: 1 MG/DL (ref 0.7–1.2)
EOSINOPHIL # BLD: 0.11 K/UL (ref 0.05–0.5)
EOSINOPHILS RELATIVE PERCENT: 2 % (ref 0–6)
ERYTHROCYTE [DISTWIDTH] IN BLOOD BY AUTOMATED COUNT: 18.6 % (ref 11.5–15)
FAT QUALITATIVE SPLIT STOOL: ABNORMAL
FECAL NEUTRAL FAT: NORMAL
GFR, ESTIMATED: 79 ML/MIN/1.73M2
GLUCOSE SERPL-MCNC: 93 MG/DL (ref 74–99)
HCT VFR BLD AUTO: 29.8 % (ref 37–54)
HGB BLD-MCNC: 10.1 G/DL (ref 12.5–16.5)
IMM GRANULOCYTES # BLD AUTO: 0.04 K/UL (ref 0–0.58)
IMM GRANULOCYTES NFR BLD: 1 % (ref 0–5)
LYMPHOCYTES NFR BLD: 0.87 K/UL (ref 1.5–4)
LYMPHOCYTES RELATIVE PERCENT: 17 % (ref 20–42)
MCH RBC QN AUTO: 29 PG (ref 26–35)
MCHC RBC AUTO-ENTMCNC: 33.9 G/DL (ref 32–34.5)
MCV RBC AUTO: 85.6 FL (ref 80–99.9)
MICROORGANISM SPEC CULT: ABNORMAL
MONOCYTES NFR BLD: 1.17 K/UL (ref 0.1–0.95)
MONOCYTES NFR BLD: 22 % (ref 2–12)
NEUTROPHILS NFR BLD: 58 % (ref 43–80)
NEUTS SEG NFR BLD: 3.04 K/UL (ref 1.8–7.3)
PLATELET # BLD AUTO: 118 K/UL (ref 130–450)
PMV BLD AUTO: 9.8 FL (ref 7–12)
POTASSIUM SERPL-SCNC: 3.6 MMOL/L (ref 3.5–5.1)
PROT SERPL-MCNC: 4.9 G/DL (ref 6.4–8.3)
RBC # BLD AUTO: 3.48 M/UL (ref 3.8–5.8)
RBC # BLD: ABNORMAL 10*6/UL
SERVICE CMNT-IMP: ABNORMAL
SODIUM SERPL-SCNC: 137 MMOL/L (ref 136–145)
SPECIMEN DESCRIPTION: ABNORMAL
WBC # BLD: ABNORMAL 10*3/UL
WBC OTHER # BLD: 5.2 K/UL (ref 4.5–11.5)

## 2025-07-01 PROCEDURE — 6370000000 HC RX 637 (ALT 250 FOR IP)

## 2025-07-01 PROCEDURE — 36415 COLL VENOUS BLD VENIPUNCTURE: CPT

## 2025-07-01 PROCEDURE — 6370000000 HC RX 637 (ALT 250 FOR IP): Performed by: STUDENT IN AN ORGANIZED HEALTH CARE EDUCATION/TRAINING PROGRAM

## 2025-07-01 PROCEDURE — 96372 THER/PROPH/DIAG INJ SC/IM: CPT

## 2025-07-01 PROCEDURE — 85025 COMPLETE CBC W/AUTO DIFF WBC: CPT

## 2025-07-01 PROCEDURE — 2500000003 HC RX 250 WO HCPCS

## 2025-07-01 PROCEDURE — 6360000002 HC RX W HCPCS

## 2025-07-01 PROCEDURE — 99239 HOSP IP/OBS DSCHRG MGMT >30: CPT | Performed by: STUDENT IN AN ORGANIZED HEALTH CARE EDUCATION/TRAINING PROGRAM

## 2025-07-01 PROCEDURE — 6370000000 HC RX 637 (ALT 250 FOR IP): Performed by: REGISTERED NURSE

## 2025-07-01 PROCEDURE — 96366 THER/PROPH/DIAG IV INF ADDON: CPT

## 2025-07-01 PROCEDURE — 80053 COMPREHEN METABOLIC PANEL: CPT

## 2025-07-01 PROCEDURE — G0378 HOSPITAL OBSERVATION PER HR: HCPCS

## 2025-07-01 RX ORDER — PANTOPRAZOLE SODIUM 40 MG/1
40 TABLET, DELAYED RELEASE ORAL
Status: DISCONTINUED | OUTPATIENT
Start: 2025-07-02 | End: 2025-07-01 | Stop reason: HOSPADM

## 2025-07-01 RX ORDER — CIPROFLOXACIN 500 MG/1
500 TABLET, FILM COATED ORAL EVERY 12 HOURS SCHEDULED
Qty: 6 TABLET | Refills: 0 | Status: SHIPPED | OUTPATIENT
Start: 2025-07-01 | End: 2025-07-04

## 2025-07-01 RX ORDER — METRONIDAZOLE 500 MG/1
500 TABLET ORAL EVERY 8 HOURS SCHEDULED
Qty: 9 TABLET | Refills: 0 | Status: SHIPPED | OUTPATIENT
Start: 2025-07-01 | End: 2025-07-04

## 2025-07-01 RX ORDER — CIPROFLOXACIN 500 MG/1
500 TABLET, FILM COATED ORAL EVERY 12 HOURS SCHEDULED
Status: DISCONTINUED | OUTPATIENT
Start: 2025-07-01 | End: 2025-07-01 | Stop reason: HOSPADM

## 2025-07-01 RX ORDER — VITAMIN B COMPLEX
100 TABLET ORAL DAILY
Status: DISCONTINUED | OUTPATIENT
Start: 2025-07-01 | End: 2025-07-01 | Stop reason: CLARIF

## 2025-07-01 RX ORDER — METRONIDAZOLE 500 MG/1
500 TABLET ORAL EVERY 8 HOURS SCHEDULED
Status: DISCONTINUED | OUTPATIENT
Start: 2025-07-01 | End: 2025-07-01 | Stop reason: HOSPADM

## 2025-07-01 RX ADMIN — ACETAMINOPHEN 650 MG: 325 TABLET ORAL at 04:10

## 2025-07-01 RX ADMIN — SODIUM CHLORIDE, PRESERVATIVE FREE 10 ML: 5 INJECTION INTRAVENOUS at 09:21

## 2025-07-01 RX ADMIN — METRONIDAZOLE 500 MG: 500 TABLET ORAL at 14:46

## 2025-07-01 RX ADMIN — METRONIDAZOLE 500 MG: 500 INJECTION, SOLUTION INTRAVENOUS at 09:22

## 2025-07-01 RX ADMIN — CIPROFLOXACIN 400 MG: 400 INJECTION, SOLUTION INTRAVENOUS at 06:04

## 2025-07-01 RX ADMIN — METRONIDAZOLE 500 MG: 500 INJECTION, SOLUTION INTRAVENOUS at 00:48

## 2025-07-01 RX ADMIN — ENOXAPARIN SODIUM 40 MG: 100 INJECTION SUBCUTANEOUS at 09:21

## 2025-07-01 RX ADMIN — Medication 2500 UNITS: at 11:15

## 2025-07-01 NOTE — PROGRESS NOTES
[facility]  Hematology/Oncology  Consult      Patient Name: Edilberto Hillman  YOB: 1950  PCP: Corey Green DO   Referring Provider:      Reason for Consultation:   Chief Complaint   Patient presents with    Abdominal Pain     Seen at [place] Friday. Hx: colon CA. Takes oral chemo     Fever     103 day    Fatigue     Anorexia, chemo pt        History of Present Illness: This is a 74-year-old male patient following with Dr. 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 Saw Dr. Chambers [facility] and was followed by surveillance scans.  CT's in 2/2025 showed sigmoid colon mass and he was diagnosed sigmoid adenocarcinoma, s/p resection 3/18/25. 0/36 LNs. T4a disease. Pathology showed moderately differentiated   adenocarcinoma (4 cm) invading through the perirectal soft tissue to involve serosa. Negative for lymphovascular invasion, and negative for perineural invasion. Negative margins. Saw med onc at [facility] on 5/23/25. Decision was made to initiate capecitabine 2000mg BID 2 weeks on 1 week off with plans for 6 months of adjuvant treatment followed by repeat scans CT C/A/P 5/22/25 all negative/stable. Patient presented to the ED for evaluation of abdominal pain and fever. CT A/P showed Interval worsening of enterocolitis. He was also positive for the rhinovirus. Patient was started on ciprofloxacin and Flagyl, stool culture for C. difficile rotavirus and Giardia sent, results awaited, blood culture have been sent. ID has been consulted with further recommendations pending. CMP and LFT's are unremarkable. CBC with WBC 4.3. Hgb 10.6. Platelets WNL. Consultation for patient with known colon ca, now with enterocolitis.     Review of systems: Over 10 systems were reviewed and all were negative except as mentioned above      Diagnostic Data:     Past Medical  9 07/01/2025    CREATININE 1.0 07/01/2025    GLUCOSE 93 07/01/2025    CALCIUM 8.5 (L) 07/01/2025    BILITOT 1.0 07/01/2025    ALKPHOS 50 07/01/2025    AST 21 07/01/2025    ALT 10 07/01/2025    LABGLOM 79 07/01/2025    GFRAA >60 12/16/2021       No results found for: \"IRON\", \"TIBC\", \"FERRITIN\"        Radiology-    CT ABDOMEN PELVIS W IV CONTRAST Additional Contrast? None  Result Date: 6/29/2025  EXAMINATION: CT OF THE ABDOMEN AND PELVIS WITH CONTRAST 6/29/2025 9:36 pm TECHNIQUE: CT of the abdomen and pelvis was performed with the administration of intravenous contrast. Multiplanar reformatted images are provided for review. Automated exposure control, iterative reconstruction, and/or weight based adjustment of the mA/kV was utilized to reduce the radiation dose to as low as reasonably achievable. COMPARISON: 06/27/2025 HISTORY: ORDERING SYSTEM PROVIDED HISTORY: lower abd pain, nausea, fever, diarrhea; hx colon CA s/p removal in March 2025 TECHNOLOGIST PROVIDED HISTORY: Reason for exam:->lower abd pain, nausea, fever, diarrhea; hx colon CA s/p removal in March 2025 Additional Contrast?->None Decision Support Exception - unselect if not a suspected or confirmed emergency medical condition->Emergency Medical Condition (MA) FINDINGS: Lower Chest: Mild subsegmental atelectasis within the lung bases.  Heart size is at upper normal limits.  Severe coronary artery calcifications. Liver: Unremarkable. Gallbladder and biliary system: Unremarkable. Spleen: Unremarkable. Pancreas: Unremarkable. Adrenal glands: Unremarkable. : Unremarkable kidneys. Unremarkable urinary bladder. Prostate is mostly obscured by beam hardening artifact from bilateral hip arthroplasty. GI/Bowel: Visualized lower esophagus is unremarkable. The stomach is unremarkable. A long segment of ileum moderate circumferential thickening with edematous appearance and targeted enhancement, suggestive of enteritis, slightly worsened compared to prior exam.

## 2025-07-01 NOTE — DISCHARGE SUMMARY
Decision Support Exception - unselect if not a suspected or confirmed emergency medical condition->Emergency Medical Condition (MA) FINDINGS: Lower Chest: Mild subsegmental atelectasis within the lung bases.  Heart size is at upper normal limits.  Severe coronary artery calcifications. Liver: Unremarkable. Gallbladder and biliary system: Unremarkable. Spleen: Unremarkable. Pancreas: Unremarkable. Adrenal glands: Unremarkable. : Unremarkable kidneys. Unremarkable urinary bladder. Prostate is mostly obscured by beam hardening artifact from bilateral hip arthroplasty. GI/Bowel: Visualized lower esophagus is unremarkable. The stomach is unremarkable. A long segment of ileum moderate circumferential thickening with edematous appearance and targeted enhancement, suggestive of enteritis, slightly worsened compared to prior exam.  Interval worsening of fat stranding within the right lower quadrant postsurgical change of partial right colectomy with ileal colonic anastomosis.  Postsurgical change of sigmoidectomy with colorectal anastomosis within the deep pelvis.  Mild left and sigmoid colonic diverticulosis without acute inflammation.  Fluid within the right and transverse colon, suggestive colitis, slightly worsened compared to prior exam. Aorta and major vessels: Unremarkable. Mesentery and retroperitoneum: Unremarkable. Lymph nodes: Unremarkable. Ascites: None. Bones/Soft Tissues: No aggressive bone lesions. No acute fracture or malalignment. Healed anterior lower abdominal wall colostomy tract.  Grade 2 anterior translation of L5 over S1 with bilateral pars defect.  Severe multilevel degenerative changes of the lumbar spine.  Diffuse idiopathic skeletal hyperostosis of the lower thoracic spine.  Intact bilateral total hip arthroplasty.     Interval worsening of enterocolitis.     XR CHEST PORTABLE  Result Date: 6/29/2025  EXAMINATION: ONE XRAY VIEW OF THE CHEST 6/29/2025 9:23 pm COMPARISON: 03/23/2024 HISTORY:  [phone]   ciprofloxacin 500 MG tablet  metroNIDAZOLE 500 MG tablet           Note that more than 30 minutes was spent in preparing discharge papers, discussing discharge with patient, medication review, etc.    Signed:  Electronically signed by Lauren Dickens MD on 7/1/2025 at 4:22 PM

## 2025-07-01 NOTE — PROGRESS NOTES
CLINICAL PHARMACY NOTE: MEDS TO BEDS    Total # of Prescriptions Filled: 2   The following medications were delivered to the patient:  Ciprofloxacin 500 mg  Metronidazole 500 mg     Additional Documentation:  Patients wife porter picked up in pharmacy

## 2025-07-01 NOTE — DISCHARGE INSTR - DIET

## 2025-07-01 NOTE — PROGRESS NOTES
Formerly [facility]  [facility]  Progress Note    SUBJECTIVE:  Chief Complaint   Patient presents with    Abdominal Pain     Seen at [place] Friday. Hx: colon CA. Takes oral chemo     Fever     103 day    Fatigue     Anorexia, chemo pt     Patient is tolerating medications. No reported adverse drug reactions.  No nausea, vomiting.  Says he had 2 loose stools today  Tmax 100.9 °F  Says he is feeling significantly better, he is eating regular food, denies any abdominal pain    Review of systems:  As stated above in the chief complaint, otherwise negative.    Medications:  Scheduled Meds:   [START ON 2025] pantoprazole  40 mg Oral QAM AC    vitamin D3  2,500 Units Oral Daily    sodium chloride flush  5-40 mL IntraVENous 2 times per day    enoxaparin  40 mg SubCUTAneous Daily    metroNIDAZOLE  500 mg IntraVENous Q8H    ciprofloxacin  400 mg IntraVENous Q12H     Continuous Infusions:   sodium chloride       PRN Meds:sodium chloride flush, sodium chloride, potassium chloride **OR** potassium alternative oral replacement **OR** potassium chloride, magnesium sulfate, polyethylene glycol, acetaminophen **OR** acetaminophen, ondansetron    OBJECTIVE:  /89   Pulse 53   Temp 98.3 °F (36.8 °C) (Oral)   Resp 18   Ht 1.829 m (6')   Wt 88.5 kg (195 lb)   SpO2 97%   BMI 26.45 kg/m²   Temp  Av °F (37.2 °C)  Min: 97.9 °F (36.6 °C)  Max: 100.9 °F (38.3 °C)  Constitutional: The patient is awake, alert, and oriented.  Resting in bed.  Just finished lunch.  Skin: Warm and dry. No rashes were noted.   HEENT: Round and reactive pupils.  Moist mucous membranes.  No ulcerations or thrush.  Neck: Supple to movements.   Chest: No use of accessory muscles to breathe. Symmetrical expansion.  No wheezing, crackles or rhonchi.  Cardiovascular: S1 and S2 are rhythmic and regular. No murmurs appreciated.   Abdomen: Positive bowel sounds to auscultation. Benign to palpation. No masses  felt.  Extremities: No edema.  Lines: Peripheral.    Laboratory and Tests:  Lab Results   Component Value Date    CRP <3.0 04/17/2025    CRP 3.0 11/13/2024    CRP 23.0 (H) 09/23/2024     Lab Results   Component Value Date    SEDRATE 13 04/17/2025    SEDRATE 26 (H) 11/13/2024    SEDRATE 67 (H) 09/23/2024       Radiology:  Reviewed    Microbiology:   RVP: Rhino/enterovirus  Blood culture 6/29/2025: Negative so far  Stool for C. difficile: Canceled, did not meet criteria    ASSESSMENT:  Rhino/Enterovirus infection  Diarrhea associated to chemotherapy  Enterocolitis   Fever associated to the above, also side effect of oral chemotherapy agent    PLAN:  Continue Ciprofloxacin and Flagyl for 3 more days-reconciled  Labs and cultures reviewed  Ok to discharge from ID standpoint     CHRISTEN Jiang - CNP  1:35 PM  7/1/2025    Patient seen and examined. I had a face to face encounter with the patient. Agree with exam.  Assessment and plan as outlined above and directed by me. Addition and corrections were done as deemed appropriate. My exam and plan include: The patient feels well.  Abdominal pain has improved.  He has some loose stools.  He can finish a total of 5 days of antibiotics.  Okay for probiotics.  He can be discharged from ID standpoint.  Follow-up with PCP.    Cristofer Sierra MD  7/1/2025  1:50 PM

## 2025-07-01 NOTE — PROGRESS NOTES
Herbal and Nutritional Product Restrictions      The following herbal, alternative, and/or nutritional/dietary supplement product(s) has been discontinued per [facility] approved policy:    CoQ10 caps 100 mg daily    Please reorder upon discharge if appropriate.    Thank you,  Ze Aden RP  7/1/2025 10:18 AM

## 2025-07-01 NOTE — PLAN OF CARE
Problem: Discharge Planning  Goal: Discharge to home or other facility with appropriate resources  Outcome: Adequate for Discharge  Flowsheets (Taken 7/1/2025 0800)  Discharge to home or other facility with appropriate resources:   Identify barriers to discharge with patient and caregiver   Arrange for needed discharge resources and transportation as appropriate     Problem: Pain  Goal: Verbalizes/displays adequate comfort level or baseline comfort level  Outcome: Adequate for Discharge  Flowsheets (Taken 7/1/2025 0800)  Verbalizes/displays adequate comfort level or baseline comfort level:   Encourage patient to monitor pain and request assistance   Assess pain using appropriate pain scale     Problem: Metabolic/Fluid and Electrolytes - Adult  Goal: Electrolytes maintained within normal limits  Outcome: Adequate for Discharge  Flowsheets (Taken 7/1/2025 0800)  Electrolytes maintained within normal limits:   Monitor labs and assess patient for signs and symptoms of electrolyte imbalances   Administer electrolyte replacement as ordered  Goal: Hemodynamic stability and optimal renal function maintained  Outcome: Adequate for Discharge  Flowsheets (Taken 7/1/2025 0800)  Hemodynamic stability and optimal renal function maintained:   Monitor labs and assess for signs and symptoms of volume excess or deficit   Monitor intake, output and patient weight   Monitor urine specific gravity, serum osmolarity and serum sodium as indicated or ordered     Problem: Chronic Conditions and Co-morbidities  Goal: Patient's chronic conditions and co-morbidity symptoms are monitored and maintained or improved  Outcome: Adequate for Discharge  Flowsheets (Taken 7/1/2025 0800)  Care Plan - Patient's Chronic Conditions and Co-Morbidity Symptoms are Monitored and Maintained or Improved:   Monitor and assess patient's chronic conditions and comorbid symptoms for stability, deterioration, or improvement   Collaborate with multidisciplinary

## 2025-07-01 NOTE — PLAN OF CARE
Problem: Discharge Planning  Goal: Discharge to home or other facility with appropriate resources  Outcome: Progressing     Problem: Pain  Goal: Verbalizes/displays adequate comfort level or baseline comfort level  Outcome: Progressing     Problem: Metabolic/Fluid and Electrolytes - Adult  Goal: Electrolytes maintained within normal limits  Outcome: Progressing     Problem: Metabolic/Fluid and Electrolytes - Adult  Goal: Hemodynamic stability and optimal renal function maintained  Outcome: Progressing     Problem: Chronic Conditions and Co-morbidities  Goal: Patient's chronic conditions and co-morbidity symptoms are monitored and maintained or improved  Outcome: Progressing

## 2025-07-02 ENCOUNTER — CARE COORDINATION (OUTPATIENT)
Dept: CARE COORDINATION | Age: 75
End: 2025-07-02

## 2025-07-02 DIAGNOSIS — K52.9 ENTEROCOLITIS: Primary | ICD-10-CM

## 2025-07-02 LAB
MICROORGANISM SPEC CULT: NORMAL
MICROORGANISM SPEC CULT: NORMAL
SPECIMEN DESCRIPTION: NORMAL

## 2025-07-02 PROCEDURE — 1111F DSCHRG MED/CURRENT MED MERGE: CPT | Performed by: FAMILY MEDICINE

## 2025-07-02 NOTE — CARE COORDINATION
Care Transitions Note    Initial Call - Call within 2 business days of discharge: Yes    Patient Current Location:  Home: [address] [place] [id number]    Care Transition Nurse contacted the patient by telephone to perform post hospital discharge assessment, verified name and  as identifiers.  Provided introduction to self, and explanation of the Care Transition Nurse role.    Patient: Edilberto Hillman    Patient : 1950   MRN: [id number]    Reason for Admission: Enterocolitis  Discharge Date: 25  RURS: Readmission Risk Score: 16.5      Last Discharge Facility       Date Complaint Diagnosis Description Type Department Provider    25 Abdominal Pain; Fever; Fatigue Enterocolitis ... ED to Hosp-Admission (Discharged) (ADMITTED) FUAD WalkerW Lauren Dickens MD; Mat Park...            Was this an external facility discharge? No    Additional needs identified to be addressed with provider   No needs identified             Method of communication with provider: none.    Patients top risk factors for readmission: lack of knowledge about disease, medical condition-HTN, Colon Cancer on oral chemotherapy, multiple health system providers, and polypharmacy    Interventions to address risk factors:   Review of patient management of conditions/medications: Advised to take Cipro/Flagyl as directed until finished    Care Summary Note: Spoke with patient wife (Dede) on communication release regarding initial TONY/hospital discharge (medium risk) follow up for enterocolitis. Dede states patient is doing better since discharge and admits he was able to tolerate eating breakfast this morning.     Dede states patient diarrhea is improving anf admits to having only one episode since discharge and not needing to take any Imodium. Denies any blood in stool. Denies any dizziness, light headedness or feeling faint. Denies any shortness of breath, chest pain or chest discomfort. Denies any abdominal pain,

## 2025-07-05 NOTE — PROGRESS NOTES
Physician Progress Note      PATIENT:               ZIA ZHONG  CSN #:                  [id number]  :                       1950  ADMIT DATE:       2025 7:51 PM  DISCH DATE:        2025 4:51 PM  RESPONDING  PROVIDER #:        Lauren Dickens MD          QUERY TEXT:    Sepsis was documented in the medical record H&P  by Shyann Fitzgerald APRN - CNP.  The diagnosis was not noted in subsequent documentation.  Please   clarify the status of this condition.    The clinical indicators include:  H&P  by Shyann Fitzgerald APRN - CNP states:  -\"Enterocolitis: Patient presents with sepsis criteria leukopenia and fever.    Treated with 30 mL/kg NS bolus.  Patient has an allergy to ceftriaxone, will   start ciprofloxacin.  Continue Flagyl.  Stool culture, C. difficile,   rotavirus, fecal fat and Giardia pending.  Follow blood cultures.\"    *Rhinovirus infection positive  *Enterovirus infection positive    >WBC: : 4.3,  5.2  >Lactic acid, sepsis: : 1.1  >Temp: 38.5, 36.8, 37.2, 37.2, 36.9, 37.8, 37.6, 36.7    Tx: ciprofloxacin and Flagyl; sodium chloride 0.9 % bolus 2,655 mL  Options provided:  -- Sepsis confirmed after study as evidenced by, Please provide evidence:  -- Sepsis ruled out after study  -- Other - I will add my own diagnosis  -- Disagree - Not applicable / Not valid  -- Disagree - Clinically unable to determine / Unknown  -- Refer to Clinical Documentation Reviewer    PROVIDER RESPONSE TEXT:    Sepsis ruled out after study.    Query created by: Elisa Ferguson on 7/3/2025 11:32 AM      Electronically signed by:  Lauren Dickens MD 2025 9:04 AM

## 2025-07-07 NOTE — PROGRESS NOTES
Physician Progress Note      PATIENT:               IZA ZHONG  CSN #:                  [id number]  :                       1950  ADMIT DATE:       2025 7:51 PM  DISCH DATE:        2025 4:51 PM  RESPONDING  PROVIDER #:        Lauren Dickens MD          QUERY TEXT:    Immunocompromised state was documented in the medical record ED notes  by   Nu Park DO.  The diagnosis was not noted in subsequent   documentation.  Please clarify the status of this condition.    The clinical indicators include:  ED notes  by Nu Park DO states:  -\"Immunocompromised state\"    Risk factors: Malignant neoplasm of sigmoid colon on chemotherapy, CKD  Options provided:  -- Immunocompromised state confirmed after study  -- Immunocompromised state ruled out after study  -- Other - I will add my own diagnosis  -- Disagree - Not applicable / Not valid  -- Disagree - Clinically unable to determine / Unknown  -- Refer to Clinical Documentation Reviewer    PROVIDER RESPONSE TEXT:    Immunocompromised state confirmed after study.    Query created by: Elisa Ferguson on 2025 11:26 AM      Electronically signed by:  Lauren Dickens MD 2025 1:18 PM

## 2025-07-09 DIAGNOSIS — C18.1 PRIMARY ADENOCARCINOMA OF APPENDIX (HCC): ICD-10-CM

## 2025-07-10 ENCOUNTER — CARE COORDINATION (OUTPATIENT)
Dept: CARE COORDINATION | Age: 75
End: 2025-07-10

## 2025-07-10 ENCOUNTER — OFFICE VISIT (OUTPATIENT)
Dept: SURGERY | Age: 75
End: 2025-07-10
Payer: MEDICARE

## 2025-07-10 VITALS
DIASTOLIC BLOOD PRESSURE: 79 MMHG | RESPIRATION RATE: 18 BRPM | BODY MASS INDEX: 27.36 KG/M2 | HEART RATE: 40 BPM | TEMPERATURE: 98.4 F | HEIGHT: 72 IN | OXYGEN SATURATION: 98 % | WEIGHT: 202 LBS | SYSTOLIC BLOOD PRESSURE: 147 MMHG

## 2025-07-10 DIAGNOSIS — C19 COLORECTAL CANCER (HCC): Primary | ICD-10-CM

## 2025-07-10 PROCEDURE — 1123F ACP DISCUSS/DSCN MKR DOCD: CPT | Performed by: SURGERY

## 2025-07-10 PROCEDURE — 99213 OFFICE O/P EST LOW 20 MIN: CPT | Performed by: SURGERY

## 2025-07-10 PROCEDURE — 3078F DIAST BP <80 MM HG: CPT | Performed by: SURGERY

## 2025-07-10 PROCEDURE — 3077F SYST BP >= 140 MM HG: CPT | Performed by: SURGERY

## 2025-07-10 PROCEDURE — 1159F MED LIST DOCD IN RCRD: CPT | Performed by: SURGERY

## 2025-07-10 RX ORDER — SODIUM CHLORIDE 0.9 % (FLUSH) 0.9 %
5-40 SYRINGE (ML) INJECTION PRN
OUTPATIENT
Start: 2025-07-10

## 2025-07-10 RX ORDER — SODIUM CHLORIDE 9 MG/ML
INJECTION, SOLUTION INTRAVENOUS PRN
OUTPATIENT
Start: 2025-07-10

## 2025-07-10 RX ORDER — LOPERAMIDE HYDROCHLORIDE 2 MG/1
2 CAPSULE ORAL 4 TIMES DAILY PRN
COMMUNITY

## 2025-07-10 RX ORDER — SODIUM CHLORIDE 0.9 % (FLUSH) 0.9 %
5-40 SYRINGE (ML) INJECTION EVERY 12 HOURS SCHEDULED
OUTPATIENT
Start: 2025-07-10

## 2025-07-10 RX ORDER — AMLODIPINE BESYLATE 5 MG/1
5 TABLET ORAL DAILY
COMMUNITY

## 2025-07-10 RX ORDER — OLMESARTAN MEDOXOMIL 40 MG/1
40 TABLET ORAL DAILY
COMMUNITY

## 2025-07-10 NOTE — PROGRESS NOTES
Progress Note - Follow up    Patient's Name/Date of Birth: Edilberto Hillman / 1950    Date: 7/10/2025    PCP: Corey Green DO    Referring Physician:   Won Rush MD  [phone]    Chief Complaint   Patient presents with    Other      mediport consult/Dr Rush         HPI:    The patient is here to discuss mediport. He had an LAR and is now getting chemotherapy. He has been on PO chemo.    Patient's medications, allergies, past medical, surgical, social and family histories were reviewed and updated as appropriate.    Review of Systems  Constitutional: negative  Respiratory: negative  Cardiovascular: negative  Gastrointestinal: as in hpi  Genitourinary:negative  Integument/breast: negative    Physical Exam:  BP (!) 147/79   Pulse (!) 40   Temp 98.4 °F (36.9 °C)   Resp 18   Ht 1.829 m (6' 0.01\")   Wt 91.6 kg (202 lb)   SpO2 98%   BMI 27.39 kg/m²   General appearance: alert, cooperative and in no acute distress.  Lungs: normal work of breathing  Heart: regular rate  Abdomen: soft, nontender, nondistended  Musculoskeletal: symmetrical without edema.  Skin: normal     Impression/Plan:  74 y.o. year old male with colon cancer    All available labs and pathology reviewed.  All imaging independently reviewed and interpreted.   All provider notes reviewed.  The above was discussed with the patient at length.    I will set the patient up for mediport placement.    I explained the risks (including but not limited to injury to major blood vessels, pneumothorax, infection including sepsis with the need for port removal, catheter dislodgement), benefits, alternatives, and potential complications associated with the above procedure to be performed and transfusions when applicable with the patient/responsible person prior to the procedure. All of the patient's questions were answered. The patient understands and agrees to surgery.       Electronically by Indira Arce MD, General Surgery  on

## 2025-07-10 NOTE — PATIENT INSTRUCTIONS
General Surgery     Preoperative Instructions    Please read the following information very carefully. It contains information that is necessary to best prepare you for your upcoming procedure.    Make arrangements for a  to take you to and from your procedure.  Nothing to eat or drink after midnight the night before your procedure.  Follow your bowel prep instructions if you have them for this procedure.    3 days prior to your procedure: Stop taking blood thinners like Coumadin or Plavix or Xarelto.  5 days prior to your procedure: Stop taking Aspirin or Aspirin containing products.   If you cannot stop any of these medications prior to your procedure, please contact our office.    Medications morning of procedure:  Only heart, breathing, blood pressure, and seizure medications are permitted on the morning of your procedure. These medications can be taken with a sip of water.    IF YOU ARE UNABLE TO KEEP THE ABOVE SCHEDULED PROCEDURE, YOU MUST NOTIFY DR. RAMACHANDRAN'S OFFICE [phone]. NOT THE FACILITY.    NO CHEWING GUM OR CHEWING TOBACCO AFTER MIDNIGHT ON DAY OF PROCEDURE.    YOU MUST HAVE TRANSPORTATION TO AND FROM THE FACILITY.

## 2025-07-10 NOTE — PROGRESS NOTES
[facility] PRE-ADMISSION TESTING INSTRUCTIONS    The Preadmission Testing patient is instructed accordingly using the following criteria (check applicable):    ARRIVAL INSTRUCTIONS:  [x] Parking the day of Surgery is located in the Main Entrance lot.  Upon entering the door, make an immediate right to the surgery reception desk    [x] Bring photo ID and insurance card    [] Bring in a copy of Living will or Durable Power of  papers.    [x] Please be sure to arrange for responsible adult to provide transportation to and from the hospital    [x] Please arrange for responsible adult to be with you for the 24 hour period post procedure due to having anesthesia    [x] If you awake am of surgery not feeling well or have temperature >100 please call [phone]    GENERAL INSTRUCTIONS:    [x] May have clear liquids until 4 hours prior to surgery. Examples include water, fruit juices (no pulp), jello, popsicles, black coffee or tea, beef or chicken broth.               No gum, candy or mints.    [x] You may brush your teeth, but do not swallow any water    [x] Take medications as instructed with 1-2 oz of water.Amlodipine.,SERTRALINE AND PANTOPRAZOLE. USE FLONASE IF NEEDED.    [] Stop herbal supplements and vitamins 5 days prior to procedure    [x] Follow preop dosing of blood thinners per physician instructions    [] Take 1/2 dose of evening insulin, but no insulin after midnight    [] No oral diabetic medications after midnight    [] If diabetic and have low blood sugar or feel symptomatic, take 1-2oz apple juice only    [] Bring inhalers day of surgery    [] Bring C-PAP/ Bi-Pap day of surgery    [] Bring urine specimen day of surgery    [x] Shower or bath with soap, lather and rinse well, AM of Surgery, no lotion, powders or creams to surgical site    [] Follow bowel prep as instructed per surgeon    [x] No tobacco products within 24 hours of surgery     [x] No alcohol or illegal drug

## 2025-07-10 NOTE — PROGRESS NOTES
Left message with YON at 's OFFICE that his HGB WAS 10.1 ON 7/1/2025. PLATELET COUNT  ON 7/1/2025 ,000.

## 2025-07-15 ENCOUNTER — ANESTHESIA EVENT (OUTPATIENT)
Dept: OPERATING ROOM | Age: 75
End: 2025-07-15
Payer: MEDICARE

## 2025-07-15 ENCOUNTER — APPOINTMENT (OUTPATIENT)
Dept: GENERAL RADIOLOGY | Age: 75
End: 2025-07-15
Attending: SURGERY
Payer: MEDICARE

## 2025-07-15 ENCOUNTER — ANESTHESIA (OUTPATIENT)
Dept: OPERATING ROOM | Age: 75
End: 2025-07-15
Payer: MEDICARE

## 2025-07-15 ENCOUNTER — HOSPITAL ENCOUNTER (OUTPATIENT)
Age: 75
Setting detail: OUTPATIENT SURGERY
Discharge: HOME | End: 2025-07-15
Attending: SURGERY | Admitting: SURGERY
Payer: MEDICARE

## 2025-07-15 ENCOUNTER — HOSPITAL ENCOUNTER (OUTPATIENT)
Dept: GENERAL RADIOLOGY | Age: 75
Setting detail: OUTPATIENT SURGERY
Discharge: HOME | End: 2025-07-17
Attending: SURGERY
Payer: MEDICARE

## 2025-07-15 VITALS
RESPIRATION RATE: 16 BRPM | HEIGHT: 72 IN | HEART RATE: 45 BPM | OXYGEN SATURATION: 99 % | TEMPERATURE: 97.9 F | BODY MASS INDEX: 27.36 KG/M2 | SYSTOLIC BLOOD PRESSURE: 174 MMHG | DIASTOLIC BLOOD PRESSURE: 81 MMHG | WEIGHT: 202 LBS

## 2025-07-15 DIAGNOSIS — G89.18 POSTOPERATIVE PAIN: Primary | ICD-10-CM

## 2025-07-15 DIAGNOSIS — R52 PAIN: ICD-10-CM

## 2025-07-15 PROCEDURE — 36561 INSERT TUNNELED CV CATH: CPT | Performed by: SURGERY

## 2025-07-15 PROCEDURE — 7100000010 HC PHASE II RECOVERY - FIRST 15 MIN: Performed by: SURGERY

## 2025-07-15 PROCEDURE — 3700000001 HC ADD 15 MINUTES (ANESTHESIA): Performed by: SURGERY

## 2025-07-15 PROCEDURE — 3700000000 HC ANESTHESIA ATTENDED CARE: Performed by: SURGERY

## 2025-07-15 PROCEDURE — 3600000013 HC SURGERY LEVEL 3 ADDTL 15MIN: Performed by: SURGERY

## 2025-07-15 PROCEDURE — 7100000011 HC PHASE II RECOVERY - ADDTL 15 MIN: Performed by: SURGERY

## 2025-07-15 PROCEDURE — 2580000003 HC RX 258: Performed by: SURGERY

## 2025-07-15 PROCEDURE — 2500000003 HC RX 250 WO HCPCS: Performed by: SURGERY

## 2025-07-15 PROCEDURE — 71045 X-RAY EXAM CHEST 1 VIEW: CPT

## 2025-07-15 PROCEDURE — 6360000002 HC RX W HCPCS: Performed by: SURGERY

## 2025-07-15 PROCEDURE — 6360000002 HC RX W HCPCS: Performed by: NURSE ANESTHETIST, CERTIFIED REGISTERED

## 2025-07-15 PROCEDURE — 2709999900 HC NON-CHARGEABLE SUPPLY: Performed by: SURGERY

## 2025-07-15 PROCEDURE — 77001 FLUOROGUIDE FOR VEIN DEVICE: CPT | Performed by: SURGERY

## 2025-07-15 PROCEDURE — C1788 PORT, INDWELLING, IMP: HCPCS | Performed by: SURGERY

## 2025-07-15 PROCEDURE — 3600000003 HC SURGERY LEVEL 3 BASE: Performed by: SURGERY

## 2025-07-15 DEVICE — SMART PORT CT SINGLE TITANIUM PORT SYSTEM WITH ATTACHABLE 8.0F X 66CM POLYURETHANE CATHETER AND 8 F INTRODUCER KIT (WITH VALVED INTRODUCER)
Type: IMPLANTABLE DEVICE | Site: SUBCLAVIAN | Status: FUNCTIONAL
Brand: SMART PORT CT

## 2025-07-15 RX ORDER — HYDROCODONE BITARTRATE AND ACETAMINOPHEN 5; 325 MG/1; MG/1
1 TABLET ORAL EVERY 6 HOURS PRN
Qty: 10 TABLET | Refills: 0 | Status: SHIPPED | OUTPATIENT
Start: 2025-07-15 | End: 2025-07-18

## 2025-07-15 RX ORDER — ONDANSETRON 2 MG/ML
INJECTION INTRAMUSCULAR; INTRAVENOUS
Status: DISCONTINUED | OUTPATIENT
Start: 2025-07-15 | End: 2025-07-15 | Stop reason: SDUPTHER

## 2025-07-15 RX ORDER — DIPHENHYDRAMINE HYDROCHLORIDE 50 MG/ML
12.5 INJECTION, SOLUTION INTRAMUSCULAR; INTRAVENOUS
Status: DISCONTINUED | OUTPATIENT
Start: 2025-07-15 | End: 2025-07-15 | Stop reason: HOSPADM

## 2025-07-15 RX ORDER — PROPOFOL 10 MG/ML
INJECTION, EMULSION INTRAVENOUS
Status: DISCONTINUED | OUTPATIENT
Start: 2025-07-15 | End: 2025-07-15 | Stop reason: SDUPTHER

## 2025-07-15 RX ORDER — HEPARIN 100 UNIT/ML
SYRINGE INTRAVENOUS PRN
Status: DISCONTINUED | OUTPATIENT
Start: 2025-07-15 | End: 2025-07-15 | Stop reason: HOSPADM

## 2025-07-15 RX ORDER — LIDOCAINE HYDROCHLORIDE 20 MG/ML
INJECTION, SOLUTION INFILTRATION; PERINEURAL
Status: DISCONTINUED | OUTPATIENT
Start: 2025-07-15 | End: 2025-07-15 | Stop reason: SDUPTHER

## 2025-07-15 RX ORDER — IPRATROPIUM BROMIDE AND ALBUTEROL SULFATE 2.5; .5 MG/3ML; MG/3ML
1 SOLUTION RESPIRATORY (INHALATION)
Status: DISCONTINUED | OUTPATIENT
Start: 2025-07-15 | End: 2025-07-15 | Stop reason: HOSPADM

## 2025-07-15 RX ORDER — SODIUM CHLORIDE 0.9 % (FLUSH) 0.9 %
5-40 SYRINGE (ML) INJECTION EVERY 12 HOURS SCHEDULED
Status: DISCONTINUED | OUTPATIENT
Start: 2025-07-15 | End: 2025-07-15 | Stop reason: HOSPADM

## 2025-07-15 RX ORDER — SODIUM CHLORIDE 0.9 % (FLUSH) 0.9 %
5-40 SYRINGE (ML) INJECTION PRN
Status: DISCONTINUED | OUTPATIENT
Start: 2025-07-15 | End: 2025-07-15 | Stop reason: HOSPADM

## 2025-07-15 RX ORDER — LABETALOL HYDROCHLORIDE 5 MG/ML
5 INJECTION, SOLUTION INTRAVENOUS
Status: DISCONTINUED | OUTPATIENT
Start: 2025-07-15 | End: 2025-07-15 | Stop reason: HOSPADM

## 2025-07-15 RX ORDER — SODIUM CHLORIDE 9 MG/ML
INJECTION, SOLUTION INTRAVENOUS PRN
Status: DISCONTINUED | OUTPATIENT
Start: 2025-07-15 | End: 2025-07-15 | Stop reason: HOSPADM

## 2025-07-15 RX ORDER — BUPIVACAINE HYDROCHLORIDE AND EPINEPHRINE 2.5; 5 MG/ML; UG/ML
INJECTION, SOLUTION EPIDURAL; INFILTRATION; INTRACAUDAL; PERINEURAL PRN
Status: DISCONTINUED | OUTPATIENT
Start: 2025-07-15 | End: 2025-07-15 | Stop reason: HOSPADM

## 2025-07-15 RX ORDER — ONDANSETRON 2 MG/ML
4 INJECTION INTRAMUSCULAR; INTRAVENOUS
Status: DISCONTINUED | OUTPATIENT
Start: 2025-07-15 | End: 2025-07-15 | Stop reason: HOSPADM

## 2025-07-15 RX ORDER — MEPERIDINE HYDROCHLORIDE 50 MG/ML
12.5 INJECTION INTRAMUSCULAR; INTRAVENOUS; SUBCUTANEOUS
Status: DISCONTINUED | OUTPATIENT
Start: 2025-07-15 | End: 2025-07-15 | Stop reason: HOSPADM

## 2025-07-15 RX ORDER — PROCHLORPERAZINE EDISYLATE 5 MG/ML
5 INJECTION INTRAMUSCULAR; INTRAVENOUS
Status: DISCONTINUED | OUTPATIENT
Start: 2025-07-15 | End: 2025-07-15 | Stop reason: HOSPADM

## 2025-07-15 RX ADMIN — LIDOCAINE HYDROCHLORIDE 40 MG: 20 INJECTION, SOLUTION INFILTRATION; PERINEURAL at 12:50

## 2025-07-15 RX ADMIN — PROPOFOL 70 MG: 10 INJECTION, EMULSION INTRAVENOUS at 12:50

## 2025-07-15 RX ADMIN — SODIUM CHLORIDE: 9 INJECTION, SOLUTION INTRAVENOUS at 11:50

## 2025-07-15 RX ADMIN — VANCOMYCIN HYDROCHLORIDE 1500 MG: 10 INJECTION, POWDER, LYOPHILIZED, FOR SOLUTION INTRAVENOUS at 12:07

## 2025-07-15 RX ADMIN — ONDANSETRON 4 MG: 2 INJECTION INTRAMUSCULAR; INTRAVENOUS at 12:50

## 2025-07-15 RX ADMIN — PROPOFOL 150 MCG/KG/MIN: 10 INJECTION, EMULSION INTRAVENOUS at 12:51

## 2025-07-15 ASSESSMENT — PAIN SCALES - GENERAL
PAINLEVEL_OUTOF10: 0

## 2025-07-15 ASSESSMENT — PAIN - FUNCTIONAL ASSESSMENT: PAIN_FUNCTIONAL_ASSESSMENT: 0-10

## 2025-07-15 ASSESSMENT — LIFESTYLE VARIABLES: SMOKING_STATUS: 0

## 2025-07-15 NOTE — DISCHARGE INSTRUCTIONS
[facility] AMBULATORY PROCEDURE DISCHARGE - Mediport INSTRUCTIONS  You may be drowsy or lightheaded after receiving sedation or anesthesia.    A responsible person should be with you for the next 24 hours.    Please follow the instructions checked below:    DIET INSTRUCTIONS:  [x]Start with light diet and progress to your normal diet as you feel like eating. If you experience nausea or repeated episodes of vomiting which persist beyond 12-24 hours, notify your doctor.  []Other     ACTIVITY INSTRUCTIONS:  [x]Rest today. Increase activity as tolerated    [x]No heavy lifting or strenuous activity for 2 weeks   [x]No driving for 1 day or while on narcotics  []Other     WOUND/DRESSING INSTRUCTIONS:  Always ensure you and your care giver clean hands before and after caring for the wound.  [x]May shower      []May bathe      [x]Derma bond dressing-Do not apply lotion, gel, or liquid to wound while the derma bond is in place.   []Other         MEDICATION INSTRUCTIONS:    [x]Prescriptions sent with you.  Use as directed.  When taking pain medications, you may experience dizziness or drowsiness.  Do not drink alcohol or drive when taking these medications.  [x]You may take a non-prescription “headache remedy”, preferably one that does not contain aspirin.    Other Instructions:      FOLLOW-UP CARE:  [x]Call the office at [phone] for follow-up appointment as needed    Call physician if they or any other problems occur:  Fever over 101°    Redness, swelling, hardness or warmth at the operative site  Unrelieved nausea    Foul smelling or cloudy drainage at the operative site   Unrelieved pain    Blood soaked dressing. (Some oozing may be normal)

## 2025-07-15 NOTE — H&P
Patient's office history and physical was reviewed.    Patient examined.    There has been no change in the patient's history and physical.      Physician Signature: Electronically signed by Dr. Indira Welch    Patient's Name/Date of Birth: Edilberto Hillman / 1950    Date: 7/9/2025    PCP: Corey Green DO    Referring Physician:   No ref. provider found  N/A    No chief complaint on file.      HPI:    The patient is here to discuss mediport. He had an LAR and is now getting chemotherapy. He has been on PO chemo.    Patient's medications, allergies, past medical, surgical, social and family histories were reviewed and updated as appropriate.    Review of Systems  Constitutional: negative  Respiratory: negative  Cardiovascular: negative  Gastrointestinal: as in hpi  Genitourinary:negative  Integument/breast: negative    Physical Exam:  Ht 1.829 m (6')   Wt 91.6 kg (202 lb)   BMI 27.40 kg/m²   General appearance: alert, cooperative and in no acute distress.  Lungs: normal work of breathing  Heart: regular rate  Abdomen: soft, nontender, nondistended  Musculoskeletal: symmetrical without edema.  Skin: normal     Impression/Plan:  74 y.o. year old male with colon cancer    All available labs and pathology reviewed.  All imaging independently reviewed and interpreted.   All provider notes reviewed.  The above was discussed with the patient at length.    I will set the patient up for mediport placement.    I explained the risks (including but not limited to injury to major blood vessels, pneumothorax, infection including sepsis with the need for port removal, catheter dislodgement), benefits, alternatives, and potential complications associated with the above procedure to be performed and transfusions when applicable with the patient/responsible person prior to the procedure. All of the patient's questions were answered. The patient understands and agrees to surgery.       Electronically by

## 2025-07-15 NOTE — ANESTHESIA PRE PROCEDURE
Department of Anesthesiology  Preprocedure Note       Name:  Edilberto Hillman   Age:  74 y.o.  :  1950                                          MRN:  [id number]         Date:  7/15/2025      Surgeon: Surgeon(s):  Indira Arce MD    Procedure: Procedure(s):  MEDIPORT INSERTION    Medications prior to admission:   Prior to Admission medications    Medication Sig Start Date End Date Taking? Authorizing Provider   amLODIPine (NORVASC) 5 MG tablet Take 1 tablet by mouth daily   Yes Socorro Brunson MD   olmesartan (BENICAR) 40 MG tablet Take 1 tablet by mouth daily   Yes ProviderSocorro MD   loperamide (IMODIUM) 2 MG capsule Take 1 capsule by mouth 4 times daily as needed for Diarrhea   Yes Socorro Brunson MD   sertraline (ZOLOFT) 50 MG tablet TAKE 1 TABLET DAILY 25  Yes Corey Green DO   fluticasone (FLONASE) 50 MCG/ACT nasal spray 1 spray by Each Nostril route daily 25  Yes Corey Green DO   rosuvastatin (CRESTOR) 40 MG tablet TAKE 1 TABLET AT BEDTIME 25  Yes Corey Green DO   aspirin 81 MG EC tablet Take 1 tablet by mouth 2 times daily 24  Yes Ze Arrington DO   sucralfate (CARAFATE) 1 GM tablet Take 1 tablet by mouth 4 times daily Takes as needed 24  Yes Corey Green DO   pantoprazole (PROTONIX) 40 MG tablet Take 1 tablet by mouth daily as needed (heartburn) 24  Yes Corey Green DO   Zinc 100 MG TABS Take by mouth daily   Yes Socorro Brunson MD   vitamin D3 (CHOLECALCIFEROL) 125 MCG (5000 UT) TABS tablet Take 0.5 tablets by mouth daily 22  Yes Corey Green DO   Coenzyme Q10 (COQ10) 100 MG CAPS Take 100 mg by mouth daily   Yes Socorro Brunson MD   colchicine (COLCRYS) 0.6 MG tablet Take 1 tablet by mouth daily as needed (gout flare)  Patient not taking: Reported on 7/10/2025 4/30/24   Corey Green DO       Current medications:    Current Facility-Administered Medications   Medication Dose

## 2025-07-15 NOTE — ANESTHESIA POSTPROCEDURE EVALUATION
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    Sudha Phase I: Sudha Score: 10    Sudha Phase II:      Anesthesia Post Evaluation    Patient location during evaluation: PACU  Patient participation: complete - patient participated  Level of consciousness: awake  Airway patency: patent  Nausea & Vomiting: no nausea and no vomiting  Cardiovascular status: hemodynamically stable  Respiratory status: acceptable  Hydration status: euvolemic  Pain management: adequate        No notable events documented.

## 2025-07-15 NOTE — OP NOTE
Operative Note    Edilberto Hillman     DATE OF PROCEDURE: 7/15/2025  SURGEON: Surgeon(s):  Indira Arce MD    PREOPERATIVE DIAGNOSIS: Colon cancer    POSTOPERATIVE DIAGNOSIS: same    OPERATION:   1) Placement of right subclavian implantable port.  2) Fluroscopy     ANESTHESIA: Local 1% plain lidocaine with monitored anesthesia care.    ESTIMATED BLOOD LOSS: minimal    COMPLICATIONS: None.    SPECIMEN: None.     BRIEF HISTORY: Edilberto Hillman is a 74 y.o.  male  who was recently diagnosed with colon cancer. He  will require a MediPort for long-term IV access. I discussed the surgery with him, including the procedure, benefits, risks, and alternatives, and he agreed.     PROCEDURE IN DETAIL: The patient was taken to the operative suite and was placed on the table in a supine position with a vertical roll placed between the shoulders to splay them laterally, the arms pulled downward, and the head turned to the left. The neck, chest, and shoulders were prepped with Chloraprep and draped in a sterile fashion. The patient  was placed into Trendelenburg position and was left in Trendelenburg position during the entire procedure. The skin, subcutaneous tissue, and periosteum of the right clavicle were anesthetized with 0.25% Marcaine with epinephrine. After this, a right subclavian venipuncture was performed and a guidewire was passed through the needle and into the superior vena cava. Its position was confirmed using the fluoroscopy unit. The guidewire was then clamped to the drapes using a rubber-shod clamp to prevent inward and outward migration. Attention was turned to creation of the port pocket.     The port pocket was marked in the infraclavicular position adjacent to the venipuncture site. A transverse linear incision was made and was carried down through the subcutaneous tissue and to the muscular fascia using the electrocautery. A combination of sharp dissection with the electrocautery and

## 2025-07-17 ENCOUNTER — OFFICE VISIT (OUTPATIENT)
Dept: FAMILY MEDICINE CLINIC | Age: 75
End: 2025-07-17
Payer: MEDICARE

## 2025-07-17 VITALS
SYSTOLIC BLOOD PRESSURE: 110 MMHG | BODY MASS INDEX: 27.22 KG/M2 | HEART RATE: 46 BPM | WEIGHT: 201 LBS | HEIGHT: 72 IN | DIASTOLIC BLOOD PRESSURE: 60 MMHG | RESPIRATION RATE: 18 BRPM | OXYGEN SATURATION: 97 % | TEMPERATURE: 98.9 F

## 2025-07-17 DIAGNOSIS — C18.9 MALIGNANT NEOPLASM OF COLON, UNSPECIFIED PART OF COLON (HCC): ICD-10-CM

## 2025-07-17 DIAGNOSIS — K52.9 ENTEROCOLITIS: ICD-10-CM

## 2025-07-17 DIAGNOSIS — E55.9 VITAMIN D DEFICIENCY: ICD-10-CM

## 2025-07-17 DIAGNOSIS — E78.5 DYSLIPIDEMIA: ICD-10-CM

## 2025-07-17 DIAGNOSIS — E87.6 HYPOKALEMIA: ICD-10-CM

## 2025-07-17 DIAGNOSIS — Z09 HOSPITAL DISCHARGE FOLLOW-UP: Primary | ICD-10-CM

## 2025-07-17 DIAGNOSIS — F41.9 ANXIETY AND DEPRESSION: ICD-10-CM

## 2025-07-17 DIAGNOSIS — R73.03 PREDIABETES: ICD-10-CM

## 2025-07-17 DIAGNOSIS — F32.A ANXIETY AND DEPRESSION: ICD-10-CM

## 2025-07-17 DIAGNOSIS — I10 PRIMARY HYPERTENSION: ICD-10-CM

## 2025-07-17 PROCEDURE — 99214 OFFICE O/P EST MOD 30 MIN: CPT | Performed by: FAMILY MEDICINE

## 2025-07-17 PROCEDURE — 1159F MED LIST DOCD IN RCRD: CPT | Performed by: FAMILY MEDICINE

## 2025-07-17 PROCEDURE — 1160F RVW MEDS BY RX/DR IN RCRD: CPT | Performed by: FAMILY MEDICINE

## 2025-07-17 PROCEDURE — 1123F ACP DISCUSS/DSCN MKR DOCD: CPT | Performed by: FAMILY MEDICINE

## 2025-07-17 PROCEDURE — 3074F SYST BP LT 130 MM HG: CPT | Performed by: FAMILY MEDICINE

## 2025-07-17 PROCEDURE — 3078F DIAST BP <80 MM HG: CPT | Performed by: FAMILY MEDICINE

## 2025-07-17 PROCEDURE — 1111F DSCHRG MED/CURRENT MED MERGE: CPT | Performed by: FAMILY MEDICINE

## 2025-07-17 NOTE — PROGRESS NOTES
Post-Discharge Transitional Care  Follow Up      Edilberto Hillman   YOB: 1950    Date of Office Visit:  7/17/2025  Date of Hospital Admission: 7/15/25  Date of Hospital Discharge: 7/15/25  Risk of hospital readmission (high >=14%. Medium >=10%) :Readmission Risk Score: 16.5      Care management risk score Rising risk (score 2-5) and Complex Care (Scores >=6): No Risk Score On File     Non face to face  following discharge, date last encounter closed (first attempt may have been earlier): *No documented post hospital discharge outreach found in the last 14 days    Call initiated 2 business days of discharge: *No response recorded in the last 14 days    ASSESSMENT/PLAN:   Assessment & Plan     .Assessment & Plan  1. Hypertension.  - Blood pressure was too low during hospitalization, leading to the temporary discontinuation of olmesartan and amlodipine. Upon returning home, blood pressure increased to 130-159 mmHg, prompting resumption of both medications.  - Today's reading is 110/60 mmHg. Weight has decreased from 213 in November to 201 today, which may have contributed to the drop in blood pressure.  - Advised to monitor blood pressure closely and watch for symptoms such as dizziness or lightheadedness. If blood pressure falls below 90/60 mmHg, consider adjusting medication regimen.  - Continue current medications and monitor blood pressure regularly.    2. Anemia.  - Hemoglobin levels have decreased slightly, likely due to dilution from intravenous fluids administered during hospitalization. No evidence of blood loss.  - Last blood work on 07/01/2025 showed a decrease in hemoglobin.  - Recheck of hemoglobin levels will be conducted in the next few days to ensure improvement.  - No immediate treatment changes; monitor upcoming blood work results.    3. Constipation.  - Reports difficulty with bowel movements and is currently taking a stool softener every morning.  - No pain medications currently

## 2025-07-21 ENCOUNTER — CARE COORDINATION (OUTPATIENT)
Dept: CARE COORDINATION | Age: 75
End: 2025-07-21

## 2025-07-21 RX ORDER — ROSUVASTATIN CALCIUM 40 MG/1
40 TABLET, COATED ORAL NIGHTLY
Qty: 90 TABLET | Refills: 1 | Status: SHIPPED | OUTPATIENT
Start: 2025-07-21

## 2025-07-21 NOTE — CARE COORDINATION
Care Transitions Note    Final Call     Patient Current Location:  Home: [address] [place] [id number]    Care Transition Nurse contacted the patient by telephone. Verified name and  as identifiers.    Patient graduated from the Care Transitions program on 25.  Patient/family verbalizes confidence in the ability to self-manage at this time..      Advance Care Planning:   Does patient have an Advance Directive: reviewed and current.    Handoff:   Patient was not referred to the ACM team due to no additional needs identified.       Care Summary Note: Spoke with patient today 25 for final TONY/hospital discharge (medium risk) follow up for enterocolitis. Patient states he is dong well and offers no complaints. Confirmed with patient he had a chemo port inserted to right chest last week on 7/15/25 and had chemo on 25. Patient follows with Dr. Oviedo (Hem/Onc) at [facility] for his underlying colon cancer.     Patient denies any anymore abdominal pain,nausea, vomiting, diarrhea, chills or fever. Denies any shortness of breath, chest pain or chest discomfort. Patient states he is tolerating eating/drinking but admits to diminished appetite. States using Ensure shakes for supplementation.     Confirmed with patient he finished ATB (Cipro/Flagyl) that was ordered on hosp d/c. Confirmed with patient he completed HFU appt with Dr. Green (PCP) on 25.     Patient denies any complaints, needs or further f/u. CTN signing off.     Assessments:  Care Transitions Subsequent and Final Call    Subsequent and Final Calls  Do you have any ongoing symptoms?: No  Have your medications changed?: No  Do you have any questions related to your medications?: No  Do you have any needs or concerns that I can assist you with?: No  Identified Barriers: Other  Care Transitions Interventions  No Identified Needs  Other Interventions:              Upcoming Appointments:    Future Appointments

## 2025-07-21 NOTE — TELEPHONE ENCOUNTER
Name of Medication(s) Requested:  Requested Prescriptions     Pending Prescriptions Disp Refills    rosuvastatin (CRESTOR) 40 MG tablet [Pharmacy Med Name: ROSUVASTATIN TABS 40MG] 90 tablet 0     Sig: TAKE 1 TABLET AT BEDTIME       Medication is on current medication list Yes    Dosage and directions were verified? Yes    Quantity verified: 90 day supply     Pharmacy Verified?  Yes    Last Appointment:  7/17/2025    Future appts:  Future Appointments   Date Time Provider Department Center   1/20/2026 10:00 AM Corey Green DO [facility]        (If no appt send self scheduling link. .REFILLAPPT)  Scheduling request sent?     [] Yes  [x] No    Does patient need updated?  [] Yes  [x] No

## 2025-08-09 ENCOUNTER — HOSPITAL ENCOUNTER (EMERGENCY)
Age: 75
Discharge: HOME | End: 2025-08-09
Attending: STUDENT IN AN ORGANIZED HEALTH CARE EDUCATION/TRAINING PROGRAM
Payer: MEDICARE

## 2025-08-09 ENCOUNTER — APPOINTMENT (OUTPATIENT)
Dept: CT IMAGING | Age: 75
End: 2025-08-09
Payer: MEDICARE

## 2025-08-09 ENCOUNTER — APPOINTMENT (OUTPATIENT)
Dept: GENERAL RADIOLOGY | Age: 75
End: 2025-08-09
Payer: MEDICARE

## 2025-08-09 VITALS
WEIGHT: 200 LBS | HEIGHT: 72 IN | TEMPERATURE: 98.4 F | BODY MASS INDEX: 27.09 KG/M2 | SYSTOLIC BLOOD PRESSURE: 122 MMHG | OXYGEN SATURATION: 98 % | RESPIRATION RATE: 16 BRPM | DIASTOLIC BLOOD PRESSURE: 74 MMHG | HEART RATE: 84 BPM

## 2025-08-09 DIAGNOSIS — K52.9 COLITIS: Primary | ICD-10-CM

## 2025-08-09 LAB
ALBUMIN SERPL-MCNC: 4 G/DL (ref 3.5–5.2)
ALP SERPL-CCNC: 72 U/L (ref 40–129)
ALT SERPL-CCNC: 11 U/L (ref 0–50)
ANION GAP SERPL CALCULATED.3IONS-SCNC: 12 MMOL/L (ref 7–16)
AST SERPL-CCNC: 22 U/L (ref 0–50)
B PARAP IS1001 DNA NPH QL NAA+NON-PROBE: NOT DETECTED
B PERT DNA SPEC QL NAA+PROBE: NOT DETECTED
BASOPHILS # BLD: 0.01 K/UL (ref 0–0.2)
BASOPHILS NFR BLD: 0 % (ref 0–2)
BILIRUB SERPL-MCNC: 1.2 MG/DL (ref 0–1.2)
BILIRUB UR QL STRIP: NEGATIVE
BUN SERPL-MCNC: 18 MG/DL (ref 8–23)
C PNEUM DNA NPH QL NAA+NON-PROBE: NOT DETECTED
CALCIUM SERPL-MCNC: 8.9 MG/DL (ref 8.8–10.2)
CHLORIDE SERPL-SCNC: 104 MMOL/L (ref 98–107)
CLARITY UR: CLEAR
CO2 SERPL-SCNC: 22 MMOL/L (ref 22–29)
COLOR UR: YELLOW
CREAT SERPL-MCNC: 1.2 MG/DL (ref 0.7–1.2)
CRP SERPL HS-MCNC: 36 MG/L (ref 0–5)
EOSINOPHIL # BLD: 0.03 K/UL (ref 0.05–0.5)
EOSINOPHILS RELATIVE PERCENT: 0 % (ref 0–6)
ERYTHROCYTE [DISTWIDTH] IN BLOOD BY AUTOMATED COUNT: 18.6 % (ref 11.5–15)
ERYTHROCYTE [SEDIMENTATION RATE] IN BLOOD BY WESTERGREN METHOD: 17 MM/HR (ref 0–15)
FLUAV RNA NPH QL NAA+NON-PROBE: NOT DETECTED
FLUBV RNA NPH QL NAA+NON-PROBE: NOT DETECTED
GFR, ESTIMATED: 67 ML/MIN/1.73M2
GLUCOSE SERPL-MCNC: 108 MG/DL (ref 74–99)
GLUCOSE UR STRIP-MCNC: NEGATIVE MG/DL
HADV DNA NPH QL NAA+NON-PROBE: NOT DETECTED
HCOV 229E RNA NPH QL NAA+NON-PROBE: NOT DETECTED
HCOV HKU1 RNA NPH QL NAA+NON-PROBE: NOT DETECTED
HCOV NL63 RNA NPH QL NAA+NON-PROBE: NOT DETECTED
HCOV OC43 RNA NPH QL NAA+NON-PROBE: NOT DETECTED
HCT VFR BLD AUTO: 36.2 % (ref 37–54)
HGB BLD-MCNC: 11.6 G/DL (ref 12.5–16.5)
HGB UR QL STRIP.AUTO: NEGATIVE
HMPV RNA NPH QL NAA+NON-PROBE: NOT DETECTED
HPIV1 RNA NPH QL NAA+NON-PROBE: NOT DETECTED
HPIV2 RNA NPH QL NAA+NON-PROBE: NOT DETECTED
HPIV3 RNA NPH QL NAA+NON-PROBE: NOT DETECTED
HPIV4 RNA NPH QL NAA+NON-PROBE: NOT DETECTED
IMM GRANULOCYTES # BLD AUTO: <0.03 K/UL (ref 0–0.58)
IMM GRANULOCYTES NFR BLD: 0 % (ref 0–5)
KETONES UR STRIP-MCNC: ABNORMAL MG/DL
LACTATE BLDV-SCNC: 0.8 MMOL/L (ref 0.5–2.2)
LEUKOCYTE ESTERASE UR QL STRIP: NEGATIVE
LIPASE SERPL-CCNC: 45 U/L (ref 13–60)
LYMPHOCYTES NFR BLD: 1.19 K/UL (ref 1.5–4)
LYMPHOCYTES RELATIVE PERCENT: 14 % (ref 20–42)
M PNEUMO DNA NPH QL NAA+NON-PROBE: NOT DETECTED
MCH RBC QN AUTO: 29.4 PG (ref 26–35)
MCHC RBC AUTO-ENTMCNC: 32 G/DL (ref 32–34.5)
MCV RBC AUTO: 91.6 FL (ref 80–99.9)
MONOCYTES NFR BLD: 1.2 K/UL (ref 0.1–0.95)
MONOCYTES NFR BLD: 14 % (ref 2–12)
NEUTROPHILS NFR BLD: 71 % (ref 43–80)
NEUTS SEG NFR BLD: 5.93 K/UL (ref 1.8–7.3)
NITRITE UR QL STRIP: NEGATIVE
PH UR STRIP: 5.5 [PH] (ref 5–8)
PLATELET # BLD AUTO: 123 K/UL (ref 130–450)
PMV BLD AUTO: 11 FL (ref 7–12)
POTASSIUM SERPL-SCNC: 3.7 MMOL/L (ref 3.5–5.1)
PROT SERPL-MCNC: 6.6 G/DL (ref 6.4–8.3)
PROT UR STRIP-MCNC: NEGATIVE MG/DL
RBC # BLD AUTO: 3.95 M/UL (ref 3.8–5.8)
RBC #/AREA URNS HPF: ABNORMAL /HPF
RSV RNA NPH QL NAA+NON-PROBE: NOT DETECTED
RV+EV RNA NPH QL NAA+NON-PROBE: NOT DETECTED
SARS-COV-2 RNA NPH QL NAA+NON-PROBE: NOT DETECTED
SODIUM SERPL-SCNC: 138 MMOL/L (ref 136–145)
SP GR UR STRIP: 1.02 (ref 1–1.03)
SPECIMEN DESCRIPTION: NORMAL
UROBILINOGEN UR STRIP-ACNC: 0.2 EU/DL (ref 0–1)
WBC #/AREA URNS HPF: ABNORMAL /HPF
WBC OTHER # BLD: 8.4 K/UL (ref 4.5–11.5)

## 2025-08-09 PROCEDURE — 86140 C-REACTIVE PROTEIN: CPT

## 2025-08-09 PROCEDURE — 85025 COMPLETE CBC W/AUTO DIFF WBC: CPT

## 2025-08-09 PROCEDURE — 6370000000 HC RX 637 (ALT 250 FOR IP): Performed by: STUDENT IN AN ORGANIZED HEALTH CARE EDUCATION/TRAINING PROGRAM

## 2025-08-09 PROCEDURE — 0202U NFCT DS 22 TRGT SARS-COV-2: CPT

## 2025-08-09 PROCEDURE — 87040 BLOOD CULTURE FOR BACTERIA: CPT

## 2025-08-09 PROCEDURE — 81001 URINALYSIS AUTO W/SCOPE: CPT

## 2025-08-09 PROCEDURE — 71045 X-RAY EXAM CHEST 1 VIEW: CPT

## 2025-08-09 PROCEDURE — 83690 ASSAY OF LIPASE: CPT

## 2025-08-09 PROCEDURE — 2580000003 HC RX 258: Performed by: STUDENT IN AN ORGANIZED HEALTH CARE EDUCATION/TRAINING PROGRAM

## 2025-08-09 PROCEDURE — 85652 RBC SED RATE AUTOMATED: CPT

## 2025-08-09 PROCEDURE — 87086 URINE CULTURE/COLONY COUNT: CPT

## 2025-08-09 PROCEDURE — 99285 EMERGENCY DEPT VISIT HI MDM: CPT

## 2025-08-09 PROCEDURE — 83605 ASSAY OF LACTIC ACID: CPT

## 2025-08-09 PROCEDURE — 6360000004 HC RX CONTRAST MEDICATION: Performed by: RADIOLOGY

## 2025-08-09 PROCEDURE — 80053 COMPREHEN METABOLIC PANEL: CPT

## 2025-08-09 PROCEDURE — 74177 CT ABD & PELVIS W/CONTRAST: CPT

## 2025-08-09 RX ORDER — CIPROFLOXACIN 500 MG/1
500 TABLET, FILM COATED ORAL 2 TIMES DAILY
Qty: 14 TABLET | Refills: 0 | Status: SHIPPED | OUTPATIENT
Start: 2025-08-09 | End: 2025-08-16

## 2025-08-09 RX ORDER — METRONIDAZOLE 500 MG/1
500 TABLET ORAL 3 TIMES DAILY
Qty: 21 TABLET | Refills: 0 | Status: SHIPPED | OUTPATIENT
Start: 2025-08-09 | End: 2025-08-16

## 2025-08-09 RX ORDER — CIPROFLOXACIN 500 MG/1
500 TABLET, FILM COATED ORAL ONCE
Status: COMPLETED | OUTPATIENT
Start: 2025-08-09 | End: 2025-08-09

## 2025-08-09 RX ORDER — IOPAMIDOL 755 MG/ML
75 INJECTION, SOLUTION INTRAVASCULAR
Status: COMPLETED | OUTPATIENT
Start: 2025-08-09 | End: 2025-08-09

## 2025-08-09 RX ORDER — 0.9 % SODIUM CHLORIDE 0.9 %
1000 INTRAVENOUS SOLUTION INTRAVENOUS ONCE
Status: COMPLETED | OUTPATIENT
Start: 2025-08-09 | End: 2025-08-09

## 2025-08-09 RX ORDER — METRONIDAZOLE 500 MG/1
500 TABLET ORAL ONCE
Status: COMPLETED | OUTPATIENT
Start: 2025-08-09 | End: 2025-08-09

## 2025-08-09 RX ADMIN — IOPAMIDOL 75 ML: 755 INJECTION, SOLUTION INTRAVENOUS at 09:06

## 2025-08-09 RX ADMIN — SODIUM CHLORIDE 1000 ML: 9 INJECTION, SOLUTION INTRAVENOUS at 06:54

## 2025-08-09 RX ADMIN — METRONIDAZOLE 500 MG: 500 TABLET ORAL at 13:29

## 2025-08-09 RX ADMIN — CIPROFLOXACIN HYDROCHLORIDE 500 MG: 500 TABLET, FILM COATED ORAL at 13:28

## 2025-08-09 ASSESSMENT — PAIN SCALES - GENERAL: PAINLEVEL_OUTOF10: 4

## 2025-08-09 ASSESSMENT — PAIN DESCRIPTION - LOCATION: LOCATION: ABDOMEN

## 2025-08-10 LAB
MICROORGANISM SPEC CULT: NO GROWTH
MICROORGANISM SPEC CULT: NORMAL
MICROORGANISM SPEC CULT: NORMAL
SERVICE CMNT-IMP: NORMAL
SPECIMEN DESCRIPTION: NORMAL

## 2025-08-14 LAB
MICROORGANISM SPEC CULT: NORMAL
MICROORGANISM SPEC CULT: NORMAL
SERVICE CMNT-IMP: NORMAL
SERVICE CMNT-IMP: NORMAL
SPECIMEN DESCRIPTION: NORMAL
SPECIMEN DESCRIPTION: NORMAL

## (undated) DEVICE — DOUBLE BASIN SET: Brand: MEDLINE INDUSTRIES, INC.

## (undated) DEVICE — CANNULA SEAL

## (undated) DEVICE — BOOT,SUTURE,STANDARD,YELLOW-IN-BLUE: Brand: MEDLINE

## (undated) DEVICE — BIT DRL L5IN DIA2.4MM STD ST S STL TWST BUSA

## (undated) DEVICE — TUBING, SUCTION, 9/32" X 10', STRAIGHT: Brand: MEDLINE

## (undated) DEVICE — SPONGE LAP W18XL18IN WHT COT 4 PLY FLD STRUNG RADPQ DISP ST 2 PER PACK

## (undated) DEVICE — VESSEL SEALER EXTEND: Brand: ENDOWRIST

## (undated) DEVICE — TOTAL TRAY, 16FR 10ML SIL FOLEY, URN: Brand: MEDLINE

## (undated) DEVICE — SPONGE GZ W4XL4IN RAYON POLY FILL CVR W/ NONWOVEN FAB STERILE

## (undated) DEVICE — ELECTRODE PT RET AD L9FT HI MOIST COND ADH HYDRGEL CORDED

## (undated) DEVICE — KIT,ANTI FOG,W/SPONGE & FLUID,SOFT PACK: Brand: MEDLINE

## (undated) DEVICE — DRAPE C ARM W41XL74IN UNIV MOB W RUBBERBAND CLP

## (undated) DEVICE — TOWEL,OR,DSP,ST,BLUE,STD,6/PK,12PK/CS: Brand: MEDLINE

## (undated) DEVICE — SEALER ENDOSCP L37CM NANO COAT BLNT TIP LAP DIV

## (undated) DEVICE — TROCAR: Brand: KII® SLEEVE

## (undated) DEVICE — NEEDLE HYPO 25GA L1.5IN BLU POLYPR HUB S STL REG BVL STR

## (undated) DEVICE — GLOVE ORANGE PI 7 1/2   MSG9075

## (undated) DEVICE — SEALER ENDOSCP NANO COAT OPN DIV CRV L JAW LIGASURE IMPACT

## (undated) DEVICE — STAPLER INT L75MM CUT LN L73MM STPL LN L77MM BLU B FRM 8

## (undated) DEVICE — TRAP,MUCUS SPECIMEN,40CC: Brand: MEDLINE

## (undated) DEVICE — HEWSON SUTURE RETRIEVER: Brand: HEWSON SUTURE RETRIEVER

## (undated) DEVICE — GLOVE ORANGE PI 7   MSG9070

## (undated) DEVICE — FORCEPS BX L240CM JAW DIA2.4MM ORNG L CAP W/ NDL DISP RAD

## (undated) DEVICE — EXTRAS HIP

## (undated) DEVICE — GRADUATE TRIANG MEASURE 1000ML BLK PRNT

## (undated) DEVICE — TISSUE RETRIEVAL SYSTEM: Brand: INZII RETRIEVAL SYSTEM

## (undated) DEVICE — CHLORAPREP 26ML ORANGE

## (undated) DEVICE — TRAY EXTRAS HIP JAMISON REUSABLE

## (undated) DEVICE — Device

## (undated) DEVICE — BLADE,STAINLESS-STEEL,11,STRL,DISPOSABLE: Brand: MEDLINE

## (undated) DEVICE — WARMER SCP 2 ANTIFOG LAP DISP

## (undated) DEVICE — GLOVE SURG SZ 6 L12IN FNGR THK94MIL TRNSLUC YEL LTX HYDRGEL

## (undated) DEVICE — LIQUIBAND RAPID ADHESIVE 36/CS 0.8ML: Brand: MEDLINE

## (undated) DEVICE — DRAPE,CHEST,FENES,15X10,STERIL: Brand: MEDLINE

## (undated) DEVICE — SOLUTION IV IRRIG POUR BRL 0.9% SODIUM CHL 2F7124

## (undated) DEVICE — 1000 S-DRAPE TOWEL DRAPE 10/BX: Brand: STERI-DRAPE™

## (undated) DEVICE — PACK PROCEDURE SURG GEN CUST

## (undated) DEVICE — 4-PORT MANIFOLD: Brand: NEPTUNE 2

## (undated) DEVICE — Z DISCONTINUED USE 2275683 GLOVE SURG SZ 6.5 L12IN FNGR THK13MIL WHT ISOLEX

## (undated) DEVICE — DUAL CUT SAGITTAL BLADE

## (undated) DEVICE — PEEL-AWAY HOOD: Brand: FLYTE, SURGICOOL

## (undated) DEVICE — ACCESS PLATFORM FOR MINIMALLY INVASIVE SURGERY.: Brand: GELPORT® LAPAROSCOPIC  SYSTEM

## (undated) DEVICE — TRAY ORTHO1 REUSABLE

## (undated) DEVICE — INSUFFLATION NEEDLE TO ESTABLISH PNEUMOPERITONEUM.: Brand: INSUFFLATION NEEDLE

## (undated) DEVICE — HANDPIECE SET WITH COAXIAL HIGH FLOW TIP AND SUCTION TUBE: Brand: INTERPULSE

## (undated) DEVICE — INTENDED FOR TISSUE SEPARATION, AND OTHER PROCEDURES THAT REQUIRE A SHARP SURGICAL BLADE TO PUNCTURE OR CUT.: Brand: BARD-PARKER ® STAINLESS STEEL BLADES

## (undated) DEVICE — SOLUTION IRRIG 2000ML 0.9% SOD CHL USP UROMATIC PLAS CONT

## (undated) DEVICE — GOWN,SIRUS,FABRNF,XL,20/CS: Brand: MEDLINE

## (undated) DEVICE — DRAPE,REIN 53X77,STERILE: Brand: MEDLINE

## (undated) DEVICE — INSTRUMENT CURRETTES REUSABLE

## (undated) DEVICE — SPONGE GZ W4XL4IN RAYON POLY CVR W/NONWOVEN FAB STRL 2/PK

## (undated) DEVICE — INSTRUMENT GRADUATE REUSABLE

## (undated) DEVICE — GOWN,SIRUS,POLYRNF,BRTHSLV,2XL,18/CS: Brand: MEDLINE

## (undated) DEVICE — SKIN PREP TRAY 4 COMPARTM TRAY: Brand: MEDLINE INDUSTRIES, INC.

## (undated) DEVICE — GOWN,SIRUS,FABRNF,L,20/CS: Brand: MEDLINE

## (undated) DEVICE — TRAY LAMBOTS REUSABLE

## (undated) DEVICE — TOTAL HIP PK

## (undated) DEVICE — SOLUTION IRRIG 3000ML 0.9% SOD CHL USP UROMATIC PLAS CONT

## (undated) DEVICE — BLADELESS OBTURATOR: Brand: WECK VISTA

## (undated) DEVICE — STAPLER INT L60MM REG TISS BLU B FRM 8 FIRING 2 ROW AUTO

## (undated) DEVICE — SYRINGE IRRIG 60ML SFT PLIABLE BLB EZ TO GRP 1 HND USE W/

## (undated) DEVICE — DRAIN SURG 15FR SIL RND CHN W/ TRCR FULL FLUT DBL WRP TRAD

## (undated) DEVICE — ADHESIVE SKIN CLSR 0.7ML TOP DERMBND ADV

## (undated) DEVICE — SYRINGE 20ML LL S/C 50

## (undated) DEVICE — DECANTER: Brand: UNBRANDED

## (undated) DEVICE — SPECIMEN CUP W/LID: Brand: DEROYAL

## (undated) DEVICE — RELOAD STPL L75MM OPN H3.8MM CLS 1.5MM WIRE DIA0.2MM REG

## (undated) DEVICE — TOWEL SURG W16XL26IN BLU NONFENESTRATED RADPQ

## (undated) DEVICE — SET ORTHO STD STORTSTD1

## (undated) DEVICE — CLEANER,CAUTERY TIP,2X2",STERILE: Brand: MEDLINE

## (undated) DEVICE — MARKER,SKIN,WI/RULER AND LABELS: Brand: MEDLINE

## (undated) DEVICE — DRAPE,TOP,102X53,STERILE: Brand: MEDLINE

## (undated) DEVICE — CONVERTORS STOCKINETTE: Brand: CONVERTORS

## (undated) DEVICE — SYRINGE MED 10ML TRNSLUC BRL PLUNG BLK MRK POLYPR CTRL

## (undated) DEVICE — GLOVE SURG SZ 6 L12IN THK75MIL DK GRN LTX FREE

## (undated) DEVICE — 3M™ STERI-DRAPE™ U-DRAPE 1015: Brand: STERI-DRAPE™

## (undated) DEVICE — BASIC SINGLE BASIN 1-LF: Brand: MEDLINE INDUSTRIES, INC.

## (undated) DEVICE — 3M™ IOBAN™ 2 ANTIMICROBIAL INCISE DRAPE 6651EZ: Brand: IOBAN™ 2

## (undated) DEVICE — TRAY SYSTEM 7 DRILL REUSABLE

## (undated) DEVICE — BLADE CLIPPER GEN PURP NS

## (undated) DEVICE — TROCAR: Brand: KII SHIELDED BLADED ACCESS SYSTEM

## (undated) DEVICE — SYRINGE,CONTROL,LL,FINGER,GRIP: Brand: MEDLINE INDUSTRIES, INC.

## (undated) DEVICE — INSUFFLATION TUBING SET WITH FILTER, FUNNEL CONNECTOR AND LUER LOCK: Brand: JOSNOE MEDICAL INC

## (undated) DEVICE — SOLUTION WND IRRIGATION 450 ML 0.5 PVP-I 0.9 NACL

## (undated) DEVICE — TOTAL TRAY, DB, 100% SILI FOLEY, 16FR 10: Brand: MEDLINE

## (undated) DEVICE — PUMP SUC IRR TBNG L10FT W/ HNDPC ASSEMB STRYKEFLOW 2

## (undated) DEVICE — BLADELESS OBTURATOR, LONG: Brand: WECK VISTA

## (undated) DEVICE — COLUMN DRAPE

## (undated) DEVICE — GLOVE ORANGE PI 8 1/2   MSG9085

## (undated) DEVICE — CLAMP TONSIL

## (undated) DEVICE — TRAY FORCEP DEBAKEY LONG REUSABLE

## (undated) DEVICE — PILLOW POS W15XH6XL22IN RASPBERRY FOAM ABD W/ STRP DISP FOR

## (undated) DEVICE — TRAY HIP EXTRAS REUSABLE

## (undated) DEVICE — GAUZE,SPONGE,4"X4",8PLY,STRL,LF,10/TRAY: Brand: MEDLINE

## (undated) DEVICE — ARM DRAPE

## (undated) DEVICE — APPLICATOR MEDICATED 26 CC SOLUTION HI LT ORNG CHLORAPREP

## (undated) DEVICE — NEEDLE HYPO 23GA L1.5IN TURQ POLYPR HUB S STL THN WALL IM

## (undated) DEVICE — DRESSING PETRO W3XL8IN OIL EMUL N ADH GZ KNIT IMPREG CELOS

## (undated) DEVICE — SOLUTION IV 100ML 0.9% SOD CHL PLAS CONT USP VIAFLX 1 PER

## (undated) DEVICE — INSTRUMENT CLAMP TOWEL LARGE REUSABLE

## (undated) DEVICE — TIP-UP FENESTRATED GRASPER: Brand: ENDOWRIST

## (undated) DEVICE — ALCOHOL RUBBING ISO 16OZ 70%

## (undated) DEVICE — DRESSING HYDROFIBER AQUACEL AG ADVANTAGE 3.5X12 IN

## (undated) DEVICE — INSTRUMENT SYSTEM 7 BATTERY REUSABLE

## (undated) DEVICE — CADIERE FORCEPS: Brand: ENDOWRIST

## (undated) DEVICE — BAG WND LAV 1L CLR ETH ACET ACID SOD ACETT BENZALKONIUM CHL